# Patient Record
Sex: MALE | Race: WHITE | NOT HISPANIC OR LATINO | Employment: UNEMPLOYED | URBAN - NONMETROPOLITAN AREA
[De-identification: names, ages, dates, MRNs, and addresses within clinical notes are randomized per-mention and may not be internally consistent; named-entity substitution may affect disease eponyms.]

---

## 2018-01-03 ENCOUNTER — HOSPITAL ENCOUNTER (EMERGENCY)
Facility: HOSPITAL | Age: 48
Discharge: HOME | End: 2018-01-03
Attending: EMERGENCY MEDICINE | Admitting: EMERGENCY MEDICINE

## 2018-01-03 VITALS
TEMPERATURE: 98.5 F | DIASTOLIC BLOOD PRESSURE: 78 MMHG | WEIGHT: 265 LBS | BODY MASS INDEX: 41.59 KG/M2 | OXYGEN SATURATION: 98 % | HEIGHT: 67 IN | HEART RATE: 91 BPM | SYSTOLIC BLOOD PRESSURE: 144 MMHG | RESPIRATION RATE: 18 BRPM

## 2018-01-03 DIAGNOSIS — H60.503 ACUTE OTITIS EXTERNA OF BOTH EARS, UNSPECIFIED TYPE: ICD-10-CM

## 2018-01-03 DIAGNOSIS — L01.00: ICD-10-CM

## 2018-01-03 DIAGNOSIS — L01.00 BLEPHARITIS OF LEFT EYE WITH IMPETIGO: Primary | ICD-10-CM

## 2018-01-03 DIAGNOSIS — H01.003: ICD-10-CM

## 2018-01-03 DIAGNOSIS — H01.006 BLEPHARITIS OF LEFT EYE WITH IMPETIGO: Primary | ICD-10-CM

## 2018-01-03 PROCEDURE — 99283 EMERGENCY DEPT VISIT LOW MDM: CPT

## 2018-01-03 RX ORDER — CIPROFLOXACIN AND DEXAMETHASONE 3; 1 MG/ML; MG/ML
4 SUSPENSION/ DROPS AURICULAR (OTIC) 2 TIMES DAILY
Status: DISCONTINUED | OUTPATIENT
Start: 2018-01-03 | End: 2018-01-04 | Stop reason: HOSPADM

## 2018-01-03 RX ORDER — SULFAMETHOXAZOLE AND TRIMETHOPRIM 800; 160 MG/1; MG/1
1 TABLET ORAL 2 TIMES DAILY
Qty: 20 TABLET | Refills: 0 | Status: ON HOLD | OUTPATIENT
Start: 2018-01-03 | End: 2018-07-25

## 2018-01-03 RX ORDER — SULFAMETHOXAZOLE AND TRIMETHOPRIM 800; 160 MG/1; MG/1
1 TABLET ORAL ONCE
Status: COMPLETED | OUTPATIENT
Start: 2018-01-03 | End: 2018-01-03

## 2018-01-03 RX ADMIN — CIPROFLOXACIN AND DEXAMETHASONE 4 DROP: 3; 1 SUSPENSION/ DROPS AURICULAR (OTIC) at 22:34

## 2018-01-03 RX ADMIN — SULFAMETHOXAZOLE AND TRIMETHOPRIM 160 MG: 800; 160 TABLET ORAL at 22:34

## 2018-01-04 NOTE — ED PROVIDER NOTES
Subjective   Patient is a 47 y.o. male presenting with eye problem, ear pain, and abscess.   History provided by:  Patient  Eye Problem   Location:  Both eyes  Quality:  Burning  Severity:  Moderate  Onset quality:  Gradual  Timing:  Constant  Progression:  Worsening  Chronicity:  New  Relieved by:  Nothing  Associated symptoms: crusting, discharge, redness and tearing    Earache   Location:  Bilateral  Behind ear:  Redness  Quality:  Aching  Severity:  Mild  Onset quality:  Gradual  Timing:  Constant  Chronicity:  New  Relieved by:  Nothing  Ineffective treatments:  OTC medications  Associated symptoms: no abdominal pain and no fever    Abscess   Location:  Pelvis  Pelvic abscess location:  Groin  Size:  2cm  Abscess quality: draining and painful    Red streaking: no    Progression:  Improving  Pain details:     Quality:  Pressure and throbbing    Severity:  Mild    Progression:  Improving  Chronicity:  New  Relieved by:  Topical antibiotics  Associated symptoms: no fever        Review of Systems   Constitutional: Negative.  Negative for fever.   HENT: Positive for ear pain.    Eyes: Positive for discharge and redness.   Respiratory: Negative.    Cardiovascular: Negative.  Negative for chest pain.   Gastrointestinal: Negative.  Negative for abdominal pain.   Endocrine: Negative.    Genitourinary: Negative.  Negative for dysuria.   Skin: Negative.    Neurological: Negative.    Psychiatric/Behavioral: Negative.    All other systems reviewed and are negative.      No past medical history on file.    Allergies   Allergen Reactions   • Penicillins        No past surgical history on file.    No family history on file.    Social History     Social History   • Marital status:      Spouse name: N/A   • Number of children: N/A   • Years of education: N/A     Social History Main Topics   • Smoking status: Not on file   • Smokeless tobacco: Not on file   • Alcohol use Not on file   • Drug use: Not on file   • Sexual  activity: Not on file     Other Topics Concern   • Not on file     Social History Narrative           Objective   Physical Exam   Constitutional: He is oriented to person, place, and time. He appears well-developed and well-nourished. No distress.   HENT:   Head: Normocephalic and atraumatic.   Nose: Nose normal.   Exudate noted in canal of both ears.   Eyes: Conjunctivae and EOM are normal. Pupils are equal, round, and reactive to light. Right eye exhibits discharge. Left eye exhibits discharge.   Crusted lesions noted to the lateral canthus of both eyes.   Neck: Normal range of motion. Neck supple. No JVD present. No tracheal deviation present.   Cardiovascular: Normal rate, regular rhythm and normal heart sounds.    No murmur heard.  Pulmonary/Chest: Effort normal and breath sounds normal. No respiratory distress. He has no wheezes.   Abdominal: Soft. Bowel sounds are normal. There is no tenderness.   Musculoskeletal: Normal range of motion. He exhibits no edema or deformity.   Neurological: He is alert and oriented to person, place, and time. No cranial nerve deficit.   Skin: Skin is warm and dry. No rash noted. He is not diaphoretic. No erythema. No pallor.   2 cm draining abscess noted to the right inguinal area.   Psychiatric: He has a normal mood and affect. His behavior is normal. Thought content normal.   Nursing note and vitals reviewed.      Procedures         ED Course  ED Course                  MDM  Number of Diagnoses or Management Options  Acute otitis externa of both ears, unspecified type: new and requires workup  Blepharitis of left eye with impetigo: new and requires workup  Blepharitis with impetigo, right: new and requires workup  Risk of Complications, Morbidity, and/or Mortality  Presenting problems: low  Diagnostic procedures: low  Management options: low    Patient Progress  Patient progress: stable      Final diagnoses:   Blepharitis of left eye with impetigo   Blepharitis with impetigo,  right   Acute otitis externa of both ears, unspecified type            WENDIE Cruz  01/03/18 [id number]

## 2018-07-25 ENCOUNTER — HOSPITAL ENCOUNTER (OUTPATIENT)
Facility: HOSPITAL | Age: 48
Setting detail: OBSERVATION
Discharge: HOME | End: 2018-07-27
Attending: INTERNAL MEDICINE | Admitting: INTERNAL MEDICINE

## 2018-07-25 ENCOUNTER — OFFICE VISIT (OUTPATIENT)
Dept: CARDIOLOGY | Facility: CLINIC | Age: 48
End: 2018-07-25

## 2018-07-25 VITALS
DIASTOLIC BLOOD PRESSURE: 74 MMHG | SYSTOLIC BLOOD PRESSURE: 117 MMHG | RESPIRATION RATE: 16 BRPM | HEIGHT: 66 IN | HEART RATE: 95 BPM | WEIGHT: 270 LBS | BODY MASS INDEX: 43.39 KG/M2

## 2018-07-25 DIAGNOSIS — R07.2 PRECORDIAL PAIN: Primary | ICD-10-CM

## 2018-07-25 DIAGNOSIS — E11.9 TYPE 2 DIABETES MELLITUS WITHOUT COMPLICATION, WITHOUT LONG-TERM CURRENT USE OF INSULIN (HCC): ICD-10-CM

## 2018-07-25 DIAGNOSIS — I25.10 ASCVD (ARTERIOSCLEROTIC CARDIOVASCULAR DISEASE): ICD-10-CM

## 2018-07-25 DIAGNOSIS — I10 ESSENTIAL HYPERTENSION: ICD-10-CM

## 2018-07-25 DIAGNOSIS — R06.09 DYSPNEA ON EXERTION: ICD-10-CM

## 2018-07-25 PROBLEM — R07.9 CHEST PAIN: Status: ACTIVE | Noted: 2018-07-25

## 2018-07-25 LAB — GLUCOSE BLDC GLUCOMTR-MCNC: 229 MG/DL (ref 70–130)

## 2018-07-25 PROCEDURE — 85379 FIBRIN DEGRADATION QUANT: CPT | Performed by: INTERNAL MEDICINE

## 2018-07-25 PROCEDURE — 83880 ASSAY OF NATRIURETIC PEPTIDE: CPT | Performed by: INTERNAL MEDICINE

## 2018-07-25 PROCEDURE — 82962 GLUCOSE BLOOD TEST: CPT

## 2018-07-25 PROCEDURE — 99220 PR INITIAL OBSERVATION CARE/DAY 70 MINUTES: CPT | Performed by: INTERNAL MEDICINE

## 2018-07-25 PROCEDURE — 83036 HEMOGLOBIN GLYCOSYLATED A1C: CPT | Performed by: INTERNAL MEDICINE

## 2018-07-25 PROCEDURE — G0379 DIRECT REFER HOSPITAL OBSERV: HCPCS

## 2018-07-25 PROCEDURE — 80053 COMPREHEN METABOLIC PANEL: CPT | Performed by: INTERNAL MEDICINE

## 2018-07-25 PROCEDURE — 86141 C-REACTIVE PROTEIN HS: CPT | Performed by: INTERNAL MEDICINE

## 2018-07-25 PROCEDURE — 82550 ASSAY OF CK (CPK): CPT | Performed by: INTERNAL MEDICINE

## 2018-07-25 PROCEDURE — 85025 COMPLETE CBC W/AUTO DIFF WBC: CPT | Performed by: INTERNAL MEDICINE

## 2018-07-25 PROCEDURE — 83874 ASSAY OF MYOGLOBIN: CPT | Performed by: INTERNAL MEDICINE

## 2018-07-25 PROCEDURE — 84484 ASSAY OF TROPONIN QUANT: CPT | Performed by: INTERNAL MEDICINE

## 2018-07-25 PROCEDURE — G0378 HOSPITAL OBSERVATION PER HR: HCPCS

## 2018-07-25 PROCEDURE — 82553 CREATINE MB FRACTION: CPT | Performed by: INTERNAL MEDICINE

## 2018-07-25 RX ORDER — NAPROXEN 250 MG/1
500 TABLET ORAL DAILY
Status: DISCONTINUED | OUTPATIENT
Start: 2018-07-26 | End: 2018-07-25

## 2018-07-25 RX ORDER — METOPROLOL SUCCINATE 50 MG/1
50 TABLET, EXTENDED RELEASE ORAL DAILY
Status: DISCONTINUED | OUTPATIENT
Start: 2018-07-26 | End: 2018-07-27 | Stop reason: HOSPADM

## 2018-07-25 RX ORDER — METOPROLOL SUCCINATE 50 MG/1
50 TABLET, EXTENDED RELEASE ORAL DAILY
COMMUNITY
End: 2020-09-09

## 2018-07-25 RX ORDER — ALBUTEROL SULFATE 2.5 MG/3ML
2.5 SOLUTION RESPIRATORY (INHALATION) EVERY 6 HOURS PRN
Status: DISCONTINUED | OUTPATIENT
Start: 2018-07-25 | End: 2018-07-27 | Stop reason: HOSPADM

## 2018-07-25 RX ORDER — ATORVASTATIN CALCIUM 20 MG/1
20 TABLET, FILM COATED ORAL DAILY
Status: DISCONTINUED | OUTPATIENT
Start: 2018-07-26 | End: 2018-07-27 | Stop reason: HOSPADM

## 2018-07-25 RX ORDER — CLOPIDOGREL BISULFATE 75 MG/1
75 TABLET ORAL DAILY
COMMUNITY
End: 2022-01-04

## 2018-07-25 RX ORDER — ATORVASTATIN CALCIUM 20 MG/1
20 TABLET, FILM COATED ORAL DAILY
COMMUNITY

## 2018-07-25 RX ORDER — ASPIRIN 81 MG/1
243 TABLET ORAL DAILY
COMMUNITY
End: 2021-02-17

## 2018-07-25 RX ORDER — CLOPIDOGREL BISULFATE 75 MG/1
75 TABLET ORAL DAILY
Status: DISCONTINUED | OUTPATIENT
Start: 2018-07-26 | End: 2018-07-27 | Stop reason: HOSPADM

## 2018-07-25 RX ORDER — NAPROXEN 500 MG/1
500 TABLET ORAL DAILY
COMMUNITY
End: 2021-09-29 | Stop reason: ALTCHOICE

## 2018-07-25 RX ORDER — SODIUM CHLORIDE 0.9 % (FLUSH) 0.9 %
1-10 SYRINGE (ML) INJECTION AS NEEDED
Status: DISCONTINUED | OUTPATIENT
Start: 2018-07-25 | End: 2018-07-27 | Stop reason: HOSPADM

## 2018-07-25 RX ORDER — METFORMIN HYDROCHLORIDE 500 MG/1
1000 TABLET, EXTENDED RELEASE ORAL 2 TIMES DAILY
COMMUNITY

## 2018-07-25 RX ORDER — ALBUTEROL SULFATE 90 UG/1
2 AEROSOL, METERED RESPIRATORY (INHALATION) EVERY 6 HOURS PRN
COMMUNITY
End: 2020-09-09

## 2018-07-25 RX ORDER — ASPIRIN 81 MG/1
243 TABLET ORAL DAILY
Status: CANCELLED | OUTPATIENT
Start: 2018-07-26

## 2018-07-25 RX ORDER — ASPIRIN 81 MG/1
81 TABLET ORAL DAILY
Status: DISCONTINUED | OUTPATIENT
Start: 2018-07-26 | End: 2018-07-27 | Stop reason: HOSPADM

## 2018-07-25 RX ADMIN — METFORMIN HYDROCHLORIDE 250 MG: 500 TABLET ORAL at 21:49

## 2018-07-25 NOTE — PROGRESS NOTES
Norah Dawkins PA-C  Austin Sneed  1970 07/25/2018    Patient Active Problem List   Diagnosis   • Precordial pain   • Dyspnea on exertion   • ASCVD (arteriosclerotic cardiovascular disease), status post stenting about 3 years ago in [facility].        Dear Norah Dawkins PA-C:    Subjective     Austin Sneed is a 47 y.o. male with the problems as listed above, presents    Chief complaint: Chest pains and shortness of breath.    History of Present Illness: Mr. Sneed is a pleasant 47-year-old  male with history of known ASCVD, status post stenting about 3 years ago in [facility], presents with complaints of having increasing shortness of breath and some chest pains for over 2 weeks.  He describes the chest pains as being felt as dull pain on the left of his chest associated shortness of breath but no sweating.  These pains have been occurring with no relation to exertion.  He also has been having increasing dyspnea and is currently short of breath with just mild exertion.  He denies any leg edema.  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  He has been a nonsmoker for the last 3 years.      Allergies   Allergen Reactions   • Penicillins    :      Current Outpatient Prescriptions:   •  Albuterol (VENTOLIN IN), Inhale., Disp: , Rfl:   •  aspirin 81 MG EC tablet, Take 243 mg by mouth Daily. Takes 3 tabs per dose, Disp: , Rfl:   •  atorvastatin (LIPITOR) 20 MG tablet, Take 20 mg by mouth Daily., Disp: , Rfl:   •  clopidogrel (PLAVIX) 75 MG tablet, Take 75 mg by mouth Daily., Disp: , Rfl:   •  metFORMIN (GLUCOPHAGE) 500 MG tablet, Take 500 mg by mouth 2 (Two) Times a Day With Meals., Disp: , Rfl:   •  metoprolol succinate XL (TOPROL-XL) 50 MG 24 hr tablet, Take 50 mg by mouth Daily., Disp: , Rfl:   •  naproxen (NAPROSYN) 500 MG tablet, Take 500 mg by mouth Daily., Disp: , Rfl:   •  sulfamethoxazole-trimethoprim (BACTRIM  "DS,SEPTRA DS) 800-160 MG per tablet, Take 1 tablet by mouth 2 (Two) Times a Day., Disp: 20 tablet, Rfl: 0    Past Medical History:   Diagnosis Date   • Diabetes mellitus (CMS/HCC)    • Hyperlipidemia    • Hypertension    • Sleep apnea      Past Surgical History:   Procedure Laterality Date   • CORONARY STENT PLACEMENT  2015    x1 stent [place] [facility]     Family History   Problem Relation Age of Onset   • Heart disease Father    • Heart attack Maternal Grandmother      Social History   Substance Use Topics   • Smoking status: Former Smoker     Packs/day: 0.50     Types: Cigarettes     Quit date: 2015   • Smokeless tobacco: Never Used   • Alcohol use Yes      Comment: social       Review of Systems   Constitution: Positive for malaise/fatigue.   Cardiovascular: Positive for chest pain and leg swelling.   Respiratory: Positive for shortness of breath.    Endocrine: Positive for polydipsia and polyuria.   Musculoskeletal: Positive for back pain, joint pain, joint swelling and stiffness.   Genitourinary: Positive for frequency.   Neurological: Positive for dizziness, headaches and light-headedness.       Objective   Blood pressure 117/74, pulse 95, resp. rate 16, height 167.6 cm (66\"), weight 122 kg (270 lb).  Body mass index is 43.58 kg/m².        Physical Exam   Constitutional: He is oriented to person, place, and time. He appears well-developed and well-nourished.   HENT:   Mouth/Throat: Oropharynx is clear and moist.   Eyes: Pupils are equal, round, and reactive to light. EOM are normal.   Neck: Neck supple. No JVD present. No tracheal deviation present. No thyromegaly present.   Cardiovascular: Normal rate, regular rhythm, S1 normal and S2 normal.  Exam reveals no gallop, no S3 and no friction rub.    No murmur heard.  Pulses:       Dorsalis pedis pulses are 1+ on the right side, and 1+ on the left side.        Posterior tibial pulses are 2+ on the right side, and 2+ on the left side.   Pulmonary/Chest: Effort normal " and breath sounds normal.   Abdominal: Soft. Bowel sounds are normal. He exhibits no mass. There is no tenderness.   Musculoskeletal: Normal range of motion. He exhibits no edema.   Lymphadenopathy:     He has no cervical adenopathy.   Neurological: He is alert and oriented to person, place, and time.   Skin: Skin is warm and dry. No rash noted.   Psychiatric: He has a normal mood and affect.           ECG 12 Lead  Date/Time: 7/25/2018 3:17 PM  Performed by: ELIAS TEE  Authorized by: ELIAS TEE   Rhythm: sinus tachycardia  Conduction: conduction normal  ST Segments: ST segments normal  T Waves: T waves normal                Assessment/Plan    Diagnosis Plan   1. Precordial pain     2. Dyspnea on exertion     3. ASCVD (arteriosclerotic cardiovascular disease), status post stenting about 3 years ago in [facility].      4. Essential hypertension     5. Type 2 diabetes mellitus without complication, without long-term current use of insulin (CMS/[facility])         Recommendations:    Since he has developed progressive dyspnea and chest pains, we will go and admit him to the hospital for further evaluation and management.  I've discussed this with him and is agreeable.    No Follow-up on file.    As always, I appreciate very much the opportunity to participate in the cardiovascular care of your patients.      With Best Regards,    Elias Tee MD, [facility]    Dragon disclaimer:  Much of this encounter note is an electronic transcription/translation of spoken language to printed text. The electronic translation of spoken language may permit erroneous, or at times, nonsensical words or phrases to be inadvertently transcribed; Although I have reviewed the note for such errors, some may still exist.

## 2018-07-26 ENCOUNTER — APPOINTMENT (OUTPATIENT)
Dept: NUCLEAR MEDICINE | Facility: HOSPITAL | Age: 48
End: 2018-07-26

## 2018-07-26 ENCOUNTER — APPOINTMENT (OUTPATIENT)
Dept: CARDIOLOGY | Facility: HOSPITAL | Age: 48
End: 2018-07-26
Attending: INTERNAL MEDICINE

## 2018-07-26 ENCOUNTER — APPOINTMENT (OUTPATIENT)
Dept: CARDIOLOGY | Facility: HOSPITAL | Age: 48
End: 2018-07-26

## 2018-07-26 ENCOUNTER — APPOINTMENT (OUTPATIENT)
Dept: GENERAL RADIOLOGY | Facility: HOSPITAL | Age: 48
End: 2018-07-26

## 2018-07-26 LAB
ALBUMIN SERPL-MCNC: 4.2 G/DL (ref 3.5–5)
ALBUMIN/GLOB SERPL: 1.7 G/DL (ref 1.5–2.5)
ALP SERPL-CCNC: 56 U/L (ref 40–129)
ALT SERPL W P-5'-P-CCNC: 65 U/L (ref 10–44)
ANION GAP SERPL CALCULATED.3IONS-SCNC: 7.5 MMOL/L (ref 3.6–11.2)
AST SERPL-CCNC: 29 U/L (ref 10–34)
BASOPHILS # BLD AUTO: 0.04 10*3/MM3 (ref 0–0.3)
BASOPHILS NFR BLD AUTO: 0.5 % (ref 0–2)
BH CV ECHO MEAS - % IVS THICK: 0 %
BH CV ECHO MEAS - % LVPW THICK: 16.7 %
BH CV ECHO MEAS - ACS: 1.9 CM
BH CV ECHO MEAS - AO MAX PG (FULL): 0.98 MMHG
BH CV ECHO MEAS - AO MAX PG: 5.1 MMHG
BH CV ECHO MEAS - AO MEAN PG (FULL): 0.71 MMHG
BH CV ECHO MEAS - AO MEAN PG: 2.7 MMHG
BH CV ECHO MEAS - AO ROOT AREA: 10.9 CM^2
BH CV ECHO MEAS - AO ROOT DIAM: 3.7 CM
BH CV ECHO MEAS - AO V2 MAX: 112.7 CM/SEC
BH CV ECHO MEAS - AO V2 MEAN: 74.8 CM/SEC
BH CV ECHO MEAS - AO V2 VTI: 20.5 CM
BH CV ECHO MEAS - AVA(I,A): 3.4 CM^2
BH CV ECHO MEAS - AVA(I,D): 3.4 CM^2
BH CV ECHO MEAS - AVA(V,A): 3.1 CM^2
BH CV ECHO MEAS - AVA(V,D): 3.1 CM^2
BH CV ECHO MEAS - EDV(CUBED): 77.2 ML
BH CV ECHO MEAS - EDV(TEICH): 81.1 ML
BH CV ECHO MEAS - EF(CUBED): 58.3 %
BH CV ECHO MEAS - EF(TEICH): 50.2 %
BH CV ECHO MEAS - ESV(CUBED): 32.2 ML
BH CV ECHO MEAS - ESV(TEICH): 40.4 ML
BH CV ECHO MEAS - FS: 25.3 %
BH CV ECHO MEAS - IVS/LVPW: 0.97
BH CV ECHO MEAS - IVSD: 1.3 CM
BH CV ECHO MEAS - IVSS: 1.3 CM
BH CV ECHO MEAS - LA DIMENSION: 2.8 CM
BH CV ECHO MEAS - LA/AO: 0.75
BH CV ECHO MEAS - LV MASS(C)D: 209.9 GRAMS
BH CV ECHO MEAS - LV MASS(C)S: 158 GRAMS
BH CV ECHO MEAS - LV MAX PG: 4.1 MMHG
BH CV ECHO MEAS - LV MEAN PG: 1.9 MMHG
BH CV ECHO MEAS - LV V1 MAX: 101.2 CM/SEC
BH CV ECHO MEAS - LV V1 MEAN: 62.2 CM/SEC
BH CV ECHO MEAS - LV V1 VTI: 20.6 CM
BH CV ECHO MEAS - LVIDD: 4.3 CM
BH CV ECHO MEAS - LVIDS: 3.2 CM
BH CV ECHO MEAS - LVOT AREA (M): 3.5 CM^2
BH CV ECHO MEAS - LVOT AREA: 3.4 CM^2
BH CV ECHO MEAS - LVOT DIAM: 2.1 CM
BH CV ECHO MEAS - LVPWD: 1.3 CM
BH CV ECHO MEAS - LVPWS: 1.6 CM
BH CV ECHO MEAS - MV A MAX VEL: 63.7 CM/SEC
BH CV ECHO MEAS - MV DEC SLOPE: 226.2 CM/SEC^2
BH CV ECHO MEAS - MV E MAX VEL: 60.2 CM/SEC
BH CV ECHO MEAS - MV E/A: 0.95
BH CV ECHO MEAS - MV P1/2T MAX VEL: 61.2 CM/SEC
BH CV ECHO MEAS - MV P1/2T: 79.3 MSEC
BH CV ECHO MEAS - MVA P1/2T LCG: 3.6 CM^2
BH CV ECHO MEAS - MVA(P1/2T): 2.8 CM^2
BH CV ECHO MEAS - RVDD: 2.8 CM
BH CV ECHO MEAS - SV(AO): 224.9 ML
BH CV ECHO MEAS - SV(CUBED): 44.9 ML
BH CV ECHO MEAS - SV(LVOT): 70.6 ML
BH CV ECHO MEAS - SV(TEICH): 40.7 ML
BH CV NUCLEAR PRIOR STUDY: 3
BH CV STRESS BP STAGE 1: NORMAL
BH CV STRESS BP STAGE 2: NORMAL
BH CV STRESS COMMENTS STAGE 1: NORMAL
BH CV STRESS COMMENTS STAGE 2: NORMAL
BH CV STRESS DOSE REGADENOSON STAGE 1: 0.4
BH CV STRESS DURATION MIN STAGE 1: 0
BH CV STRESS DURATION MIN STAGE 2: 4
BH CV STRESS DURATION SEC STAGE 1: 10
BH CV STRESS DURATION SEC STAGE 2: 0
BH CV STRESS HR STAGE 1: 117
BH CV STRESS HR STAGE 2: 94
BH CV STRESS PROTOCOL 1: NORMAL
BH CV STRESS RECOVERY BP: NORMAL MMHG
BH CV STRESS RECOVERY HR: 94 BPM
BH CV STRESS STAGE 1: 1
BH CV STRESS STAGE 2: 2
BILIRUB SERPL-MCNC: 0.4 MG/DL (ref 0.2–1.8)
BNP SERPL-MCNC: <2 PG/ML (ref 0–100)
BUN BLD-MCNC: 19 MG/DL (ref 7–21)
BUN/CREAT SERPL: 20 (ref 7–25)
CALCIUM SPEC-SCNC: 9.1 MG/DL (ref 7.7–10)
CHLORIDE SERPL-SCNC: 106 MMOL/L (ref 99–112)
CHOLEST SERPL-MCNC: 116 MG/DL (ref 0–200)
CK MB SERPL-CCNC: 0.58 NG/ML (ref 0–5)
CK MB SERPL-RTO: 0.4 % (ref 0–3)
CK SERPL-CCNC: 132 U/L (ref 24–204)
CO2 SERPL-SCNC: 25.5 MMOL/L (ref 24.3–31.9)
CREAT BLD-MCNC: 0.95 MG/DL (ref 0.43–1.29)
D DIMER PPP FEU-MCNC: <0.27 MCGFEU/ML (ref 0–0.5)
DEPRECATED RDW RBC AUTO: 42.2 FL (ref 37–54)
EOSINOPHIL # BLD AUTO: 0.15 10*3/MM3 (ref 0–0.7)
EOSINOPHIL NFR BLD AUTO: 1.7 % (ref 0–5)
ERYTHROCYTE [DISTWIDTH] IN BLOOD BY AUTOMATED COUNT: 13.4 % (ref 11.5–14.5)
GFR SERPL CREATININE-BSD FRML MDRD: 84 ML/MIN/1.73
GLOBULIN UR ELPH-MCNC: 2.5 GM/DL
GLUCOSE BLD-MCNC: 195 MG/DL (ref 70–110)
HBA1C MFR BLD: 9.4 % (ref 4.5–5.7)
HCT VFR BLD AUTO: 44.6 % (ref 42–52)
HDLC SERPL-MCNC: 32 MG/DL (ref 60–100)
HGB BLD-MCNC: 14.9 G/DL (ref 14–18)
IMM GRANULOCYTES # BLD: 0.03 10*3/MM3 (ref 0–0.03)
IMM GRANULOCYTES NFR BLD: 0.3 % (ref 0–0.5)
LDLC SERPL CALC-MCNC: 46 MG/DL (ref 0–100)
LDLC/HDLC SERPL: 1.43 {RATIO}
LV EF NUC BP: 63 %
LYMPHOCYTES # BLD AUTO: 2.3 10*3/MM3 (ref 1–3)
LYMPHOCYTES NFR BLD AUTO: 26.3 % (ref 21–51)
MAXIMAL PREDICTED HEART RATE: 171 BPM
MAXIMAL PREDICTED HEART RATE: 171 BPM
MCH RBC QN AUTO: 29.2 PG (ref 27–33)
MCHC RBC AUTO-ENTMCNC: 33.4 G/DL (ref 33–37)
MCV RBC AUTO: 87.5 FL (ref 80–94)
MONOCYTES # BLD AUTO: 0.96 10*3/MM3 (ref 0.1–0.9)
MONOCYTES NFR BLD AUTO: 11 % (ref 0–10)
MYOGLOBIN SERPL-MCNC: 42 NG/ML (ref 0–109)
NEUTROPHILS # BLD AUTO: 5.26 10*3/MM3 (ref 1.4–6.5)
NEUTROPHILS NFR BLD AUTO: 60.2 % (ref 30–70)
OSMOLALITY SERPL CALC.SUM OF ELEC: 285.2 MOSM/KG (ref 273–305)
PERCENT MAX PREDICTED HR: 68.42 %
PLATELET # BLD AUTO: 224 10*3/MM3 (ref 130–400)
PMV BLD AUTO: 10.5 FL (ref 6–10)
POTASSIUM BLD-SCNC: 3.8 MMOL/L (ref 3.5–5.3)
PROT SERPL-MCNC: 6.7 G/DL (ref 6–8)
RBC # BLD AUTO: 5.1 10*6/MM3 (ref 4.7–6.1)
SODIUM BLD-SCNC: 139 MMOL/L (ref 135–153)
STRESS BASELINE BP: NORMAL MMHG
STRESS BASELINE HR: 89 BPM
STRESS PERCENT HR: 80 %
STRESS POST PEAK BP: NORMAL MMHG
STRESS POST PEAK HR: 117 BPM
STRESS TARGET HR: 145 BPM
STRESS TARGET HR: 145 BPM
TRIGL SERPL-MCNC: 192 MG/DL (ref 0–150)
TROPONIN I SERPL-MCNC: <0.006 NG/ML
VLDLC SERPL-MCNC: 38.4 MG/DL
WBC NRBC COR # BLD: 8.74 10*3/MM3 (ref 4.5–12.5)

## 2018-07-26 PROCEDURE — 0 TECHNETIUM SESTAMIBI: Performed by: INTERNAL MEDICINE

## 2018-07-26 PROCEDURE — G0378 HOSPITAL OBSERVATION PER HR: HCPCS

## 2018-07-26 PROCEDURE — 93010 ELECTROCARDIOGRAM REPORT: CPT | Performed by: INTERNAL MEDICINE

## 2018-07-26 PROCEDURE — 93018 CV STRESS TEST I&R ONLY: CPT | Performed by: INTERNAL MEDICINE

## 2018-07-26 PROCEDURE — 71045 X-RAY EXAM CHEST 1 VIEW: CPT | Performed by: RADIOLOGY

## 2018-07-26 PROCEDURE — A9500 TC99M SESTAMIBI: HCPCS | Performed by: INTERNAL MEDICINE

## 2018-07-26 PROCEDURE — 93306 TTE W/DOPPLER COMPLETE: CPT

## 2018-07-26 PROCEDURE — 93017 CV STRESS TEST TRACING ONLY: CPT

## 2018-07-26 PROCEDURE — 96372 THER/PROPH/DIAG INJ SC/IM: CPT

## 2018-07-26 PROCEDURE — 99225 PR SBSQ OBSERVATION CARE/DAY 25 MINUTES: CPT | Performed by: INTERNAL MEDICINE

## 2018-07-26 PROCEDURE — 93306 TTE W/DOPPLER COMPLETE: CPT | Performed by: INTERNAL MEDICINE

## 2018-07-26 PROCEDURE — 80061 LIPID PANEL: CPT | Performed by: INTERNAL MEDICINE

## 2018-07-26 PROCEDURE — 94799 UNLISTED PULMONARY SVC/PX: CPT

## 2018-07-26 PROCEDURE — 93005 ELECTROCARDIOGRAM TRACING: CPT | Performed by: INTERNAL MEDICINE

## 2018-07-26 PROCEDURE — 84484 ASSAY OF TROPONIN QUANT: CPT | Performed by: INTERNAL MEDICINE

## 2018-07-26 PROCEDURE — 78452 HT MUSCLE IMAGE SPECT MULT: CPT

## 2018-07-26 PROCEDURE — 94640 AIRWAY INHALATION TREATMENT: CPT

## 2018-07-26 PROCEDURE — 25010000002 REGADENOSON 0.4 MG/5ML SOLUTION: Performed by: INTERNAL MEDICINE

## 2018-07-26 PROCEDURE — 71045 X-RAY EXAM CHEST 1 VIEW: CPT

## 2018-07-26 PROCEDURE — 25010000002 ENOXAPARIN PER 10 MG: Performed by: INTERNAL MEDICINE

## 2018-07-26 PROCEDURE — 78452 HT MUSCLE IMAGE SPECT MULT: CPT | Performed by: INTERNAL MEDICINE

## 2018-07-26 RX ADMIN — REGADENOSON 0.4 MG: 0.08 INJECTION, SOLUTION INTRAVENOUS at 10:25

## 2018-07-26 RX ADMIN — TECHNETIUM TC 99M SESTAMIBI 1 DOSE: 1 INJECTION INTRAVENOUS at 09:15

## 2018-07-26 RX ADMIN — ATORVASTATIN CALCIUM 20 MG: 20 TABLET, FILM COATED ORAL at 07:41

## 2018-07-26 RX ADMIN — ALBUTEROL SULFATE 2.5 MG: 2.5 SOLUTION RESPIRATORY (INHALATION) at 00:29

## 2018-07-26 RX ADMIN — METFORMIN HYDROCHLORIDE 500 MG: 500 TABLET ORAL at 11:52

## 2018-07-26 RX ADMIN — ENOXAPARIN SODIUM 40 MG: 40 INJECTION, SOLUTION INTRAVENOUS; SUBCUTANEOUS at 19:51

## 2018-07-26 RX ADMIN — TECHNETIUM TC 99M SESTAMIBI 1 DOSE: 1 INJECTION INTRAVENOUS at 10:25

## 2018-07-26 RX ADMIN — ENOXAPARIN SODIUM 40 MG: 40 INJECTION, SOLUTION INTRAVENOUS; SUBCUTANEOUS at 00:27

## 2018-07-26 RX ADMIN — ASPIRIN 81 MG: 81 TABLET, DELAYED RELEASE ORAL at 07:42

## 2018-07-26 RX ADMIN — METOPROLOL SUCCINATE 50 MG: 50 TABLET, FILM COATED, EXTENDED RELEASE ORAL at 11:51

## 2018-07-26 RX ADMIN — METFORMIN HYDROCHLORIDE 500 MG: 500 TABLET ORAL at 19:51

## 2018-07-26 RX ADMIN — CLOPIDOGREL 75 MG: 75 TABLET, FILM COATED ORAL at 07:41

## 2018-07-27 VITALS
DIASTOLIC BLOOD PRESSURE: 90 MMHG | TEMPERATURE: 98.4 F | RESPIRATION RATE: 20 BRPM | WEIGHT: 270.2 LBS | OXYGEN SATURATION: 97 % | BODY MASS INDEX: 42.41 KG/M2 | SYSTOLIC BLOOD PRESSURE: 140 MMHG | HEART RATE: 84 BPM | HEIGHT: 67 IN

## 2018-07-27 LAB — CRP SERPL HS-MCNC: 1.75 MG/L (ref 0–3)

## 2018-07-27 PROCEDURE — G0378 HOSPITAL OBSERVATION PER HR: HCPCS

## 2018-07-27 PROCEDURE — 99217 PR OBSERVATION CARE DISCHARGE MANAGEMENT: CPT | Performed by: INTERNAL MEDICINE

## 2018-07-27 RX ORDER — ISOSORBIDE MONONITRATE 60 MG/1
60 TABLET, EXTENDED RELEASE ORAL EVERY MORNING
Qty: 30 TABLET | Refills: 3 | Status: SHIPPED | OUTPATIENT
Start: 2018-07-27 | End: 2018-11-24

## 2018-07-27 RX ADMIN — ATORVASTATIN CALCIUM 20 MG: 20 TABLET, FILM COATED ORAL at 07:41

## 2018-07-27 RX ADMIN — ASPIRIN 81 MG: 81 TABLET, DELAYED RELEASE ORAL at 07:41

## 2018-07-27 RX ADMIN — METOPROLOL SUCCINATE 50 MG: 50 TABLET, FILM COATED, EXTENDED RELEASE ORAL at 07:41

## 2018-07-27 RX ADMIN — CLOPIDOGREL 75 MG: 75 TABLET, FILM COATED ORAL at 07:41

## 2018-07-27 RX ADMIN — METFORMIN HYDROCHLORIDE 500 MG: 500 TABLET ORAL at 07:41

## 2018-10-26 ENCOUNTER — TELEPHONE (OUTPATIENT)
Dept: CARDIOLOGY | Facility: CLINIC | Age: 48
End: 2018-10-26

## 2018-10-26 DIAGNOSIS — N52.9 ERECTILE DYSFUNCTION, UNSPECIFIED ERECTILE DYSFUNCTION TYPE: Primary | ICD-10-CM

## 2018-10-26 NOTE — TELEPHONE ENCOUNTER
He is but if he wants it we will have to stop his imdur as they absolutly cannot be taken together.

## 2018-10-26 NOTE — TELEPHONE ENCOUNTER
Patient wants to know if his heart is strong enough to take generic viagra. Can someone call them back asap?     Thanks!

## 2019-07-20 ENCOUNTER — TRANSCRIBE ORDERS (OUTPATIENT)
Dept: ADMINISTRATIVE | Facility: HOSPITAL | Age: 49
End: 2019-07-20

## 2019-07-20 ENCOUNTER — APPOINTMENT (OUTPATIENT)
Dept: LAB | Facility: HOSPITAL | Age: 49
End: 2019-07-20

## 2019-07-20 DIAGNOSIS — Z95.5 STENTED CORONARY ARTERY: ICD-10-CM

## 2019-07-20 DIAGNOSIS — E78.5 HYPERLIPIDEMIA, UNSPECIFIED HYPERLIPIDEMIA TYPE: ICD-10-CM

## 2019-07-20 DIAGNOSIS — Z80.0 FAMILY HISTORY OF COLON CANCER: ICD-10-CM

## 2019-07-20 LAB
25(OH)D3 SERPL-MCNC: 34.7 NG/ML (ref 30–100)
ALBUMIN SERPL-MCNC: 4.3 G/DL (ref 3.5–5.2)
ALBUMIN/GLOB SERPL: 1.6 G/DL
ALP SERPL-CCNC: 44 U/L (ref 39–117)
ALT SERPL W P-5'-P-CCNC: 24 U/L (ref 1–41)
ANION GAP SERPL CALCULATED.3IONS-SCNC: 15 MMOL/L (ref 5–15)
AST SERPL-CCNC: 14 U/L (ref 1–40)
BASOPHILS # BLD AUTO: 0.05 10*3/MM3 (ref 0–0.2)
BASOPHILS NFR BLD AUTO: 0.6 % (ref 0–1.5)
BILIRUB SERPL-MCNC: 0.5 MG/DL (ref 0.2–1.2)
BUN BLD-MCNC: 16 MG/DL (ref 6–20)
BUN/CREAT SERPL: 16.5 (ref 7–25)
CALCIUM SPEC-SCNC: 9.4 MG/DL (ref 8.6–10.5)
CHLORIDE SERPL-SCNC: 105 MMOL/L (ref 98–107)
CHOLEST SERPL-MCNC: 97 MG/DL (ref 0–200)
CO2 SERPL-SCNC: 21 MMOL/L (ref 22–29)
CREAT BLD-MCNC: 0.97 MG/DL (ref 0.76–1.27)
DEPRECATED RDW RBC AUTO: 45.5 FL (ref 37–54)
EOSINOPHIL # BLD AUTO: 0.1 10*3/MM3 (ref 0–0.4)
EOSINOPHIL NFR BLD AUTO: 1.2 % (ref 0.3–6.2)
ERYTHROCYTE [DISTWIDTH] IN BLOOD BY AUTOMATED COUNT: 13.4 % (ref 12.3–15.4)
GFR SERPL CREATININE-BSD FRML MDRD: 82 ML/MIN/1.73
GLOBULIN UR ELPH-MCNC: 2.7 GM/DL
GLUCOSE BLD-MCNC: 112 MG/DL (ref 65–99)
HBA1C MFR BLD: 6.15 % (ref 4.8–5.6)
HCT VFR BLD AUTO: 50 % (ref 37.5–51)
HDLC SERPL-MCNC: 35 MG/DL (ref 40–60)
HGB BLD-MCNC: 15 G/DL (ref 13–17.7)
IMM GRANULOCYTES # BLD AUTO: 0.04 10*3/MM3 (ref 0–0.05)
IMM GRANULOCYTES NFR BLD AUTO: 0.5 % (ref 0–0.5)
LDLC SERPL CALC-MCNC: 43 MG/DL (ref 0–100)
LDLC/HDLC SERPL: 1.22 {RATIO}
LYMPHOCYTES # BLD AUTO: 1.67 10*3/MM3 (ref 0.7–3.1)
LYMPHOCYTES NFR BLD AUTO: 19.8 % (ref 19.6–45.3)
MCH RBC QN AUTO: 27.5 PG (ref 26.6–33)
MCHC RBC AUTO-ENTMCNC: 30 G/DL (ref 31.5–35.7)
MCV RBC AUTO: 91.7 FL (ref 79–97)
MONOCYTES # BLD AUTO: 0.7 10*3/MM3 (ref 0.1–0.9)
MONOCYTES NFR BLD AUTO: 8.3 % (ref 5–12)
NEUTROPHILS # BLD AUTO: 5.89 10*3/MM3 (ref 1.7–7)
NEUTROPHILS NFR BLD AUTO: 69.6 % (ref 42.7–76)
NRBC BLD AUTO-RTO: 0.1 /100 WBC (ref 0–0.2)
PLATELET # BLD AUTO: 303 10*3/MM3 (ref 140–450)
PMV BLD AUTO: 9.8 FL (ref 6–12)
POTASSIUM BLD-SCNC: 4.4 MMOL/L (ref 3.5–5.2)
PROT SERPL-MCNC: 7 G/DL (ref 6–8.5)
RBC # BLD AUTO: 5.45 10*6/MM3 (ref 4.14–5.8)
SODIUM BLD-SCNC: 141 MMOL/L (ref 136–145)
TRIGL SERPL-MCNC: 97 MG/DL (ref 0–150)
VLDLC SERPL-MCNC: 19.4 MG/DL (ref 5–40)
WBC NRBC COR # BLD: 8.45 10*3/MM3 (ref 3.4–10.8)

## 2019-07-20 PROCEDURE — 36415 COLL VENOUS BLD VENIPUNCTURE: CPT | Performed by: PHYSICIAN ASSISTANT

## 2019-07-20 PROCEDURE — 85025 COMPLETE CBC W/AUTO DIFF WBC: CPT | Performed by: PHYSICIAN ASSISTANT

## 2019-07-20 PROCEDURE — 80061 LIPID PANEL: CPT | Performed by: PHYSICIAN ASSISTANT

## 2019-07-20 PROCEDURE — 83036 HEMOGLOBIN GLYCOSYLATED A1C: CPT | Performed by: PHYSICIAN ASSISTANT

## 2019-07-20 PROCEDURE — 82306 VITAMIN D 25 HYDROXY: CPT | Performed by: PHYSICIAN ASSISTANT

## 2019-07-20 PROCEDURE — 80053 COMPREHEN METABOLIC PANEL: CPT | Performed by: PHYSICIAN ASSISTANT

## 2019-08-05 ENCOUNTER — TELEPHONE (OUTPATIENT)
Dept: CARDIOLOGY | Facility: CLINIC | Age: 49
End: 2019-08-05

## 2019-08-05 NOTE — TELEPHONE ENCOUNTER
Please call patient who currently has no insurance and wants to know if he can discontinue some of his medication.

## 2019-08-07 NOTE — TELEPHONE ENCOUNTER
Called pt and advised him to contact his PCP because he hasn't been since 7/25/18. He expressed understanding.

## 2019-12-01 ENCOUNTER — APPOINTMENT (OUTPATIENT)
Dept: GENERAL RADIOLOGY | Facility: HOSPITAL | Age: 49
End: 2019-12-01

## 2019-12-01 ENCOUNTER — HOSPITAL ENCOUNTER (EMERGENCY)
Facility: HOSPITAL | Age: 49
Discharge: HOME | End: 2019-12-01
Attending: FAMILY MEDICINE | Admitting: FAMILY MEDICINE

## 2019-12-01 VITALS
DIASTOLIC BLOOD PRESSURE: 68 MMHG | OXYGEN SATURATION: 97 % | SYSTOLIC BLOOD PRESSURE: 114 MMHG | HEART RATE: 76 BPM | HEIGHT: 68 IN | WEIGHT: 240 LBS | BODY MASS INDEX: 36.37 KG/M2 | TEMPERATURE: 98.6 F | RESPIRATION RATE: 20 BRPM

## 2019-12-01 DIAGNOSIS — S82.831A CLOSED FRACTURE OF DISTAL END OF RIGHT FIBULA, UNSPECIFIED FRACTURE MORPHOLOGY, INITIAL ENCOUNTER: Primary | ICD-10-CM

## 2019-12-01 PROCEDURE — 99284 EMERGENCY DEPT VISIT MOD MDM: CPT

## 2019-12-01 PROCEDURE — 73610 X-RAY EXAM OF ANKLE: CPT

## 2019-12-01 PROCEDURE — 73610 X-RAY EXAM OF ANKLE: CPT | Performed by: RADIOLOGY

## 2019-12-01 RX ORDER — HYDROCODONE BITARTRATE AND ACETAMINOPHEN 7.5; 325 MG/1; MG/1
1 TABLET ORAL EVERY 6 HOURS PRN
Qty: 12 TABLET | Refills: 0 | Status: SHIPPED | OUTPATIENT
Start: 2019-12-01 | End: 2019-12-06 | Stop reason: SDUPTHER

## 2019-12-01 RX ORDER — HYDROCODONE BITARTRATE AND ACETAMINOPHEN 7.5; 325 MG/1; MG/1
1 TABLET ORAL ONCE
Status: COMPLETED | OUTPATIENT
Start: 2019-12-01 | End: 2019-12-01

## 2019-12-01 RX ADMIN — HYDROCODONE BITARTRATE AND ACETAMINOPHEN 1 TABLET: 7.5; 325 TABLET ORAL at 12:47

## 2019-12-01 NOTE — ED PROVIDER NOTES
Subjective     History provided by:  Patient   used: No    Fall   Mechanism of injury: fall    Injury location: right ankle.  Incident location:  Home  Time since incident:  1 day  Arrived directly from scene: no    Fall:     Fall occurred: Pt states that he slipped getting out of his car yesterday due to the rain. patient twisted right ankle.      Point of impact: NO HEAD INJURY OR LOC   Tetanus status:  Up to date      Review of Systems   Constitutional: Negative.    HENT: Negative.    Eyes: Negative.    Respiratory: Negative.    Cardiovascular: Negative.    Gastrointestinal: Negative.    Endocrine: Negative.    Genitourinary: Negative.    Musculoskeletal: Negative.    Skin: Negative.    Allergic/Immunologic: Negative.    Neurological: Negative.    Hematological: Negative.    Psychiatric/Behavioral: Negative.    All other systems reviewed and are negative.      Past Medical History:   Diagnosis Date   • Diabetes mellitus (CMS/[facility])    • Hyperlipidemia    • Hypertension    • Sleep apnea        Allergies   Allergen Reactions   • Penicillins        Past Surgical History:   Procedure Laterality Date   • CORONARY STENT PLACEMENT  2015    x1 stent [place] [facility]       Family History   Problem Relation Age of Onset   • Heart disease Father    • Heart attack Maternal Grandmother        Social History     Socioeconomic History   • Marital status:      Spouse name: Not on file   • Number of children: Not on file   • Years of education: Not on file   • Highest education level: Not on file   Tobacco Use   • Smoking status: Former Smoker     Packs/day: 0.50     Types: Cigarettes     Last attempt to quit: 2015     Years since quittin.9   • Smokeless tobacco: Never Used   Substance and Sexual Activity   • Alcohol use: Yes     Comment: social           Objective   Physical Exam   Constitutional: He is oriented to person, place, and time. He appears well-developed and well-nourished.   HENT:   Head:  Normocephalic and atraumatic.   Right Ear: External ear normal.   Left Ear: External ear normal.   Nose: Nose normal.   Mouth/Throat: Oropharynx is clear and moist.   Eyes: Conjunctivae and EOM are normal. Pupils are equal, round, and reactive to light.   Neck: Normal range of motion. Neck supple.   Cardiovascular: Normal rate, regular rhythm, normal heart sounds and intact distal pulses.   Pulmonary/Chest: Effort normal and breath sounds normal.   Abdominal: Soft. Bowel sounds are normal.   Musculoskeletal:        Right ankle: He exhibits decreased range of motion, swelling and ecchymosis. He exhibits no deformity, no laceration and normal pulse. Tenderness.   N/V intact.    Neurological: He is alert and oriented to person, place, and time.   Skin: Skin is warm and dry. Capillary refill takes less than 2 seconds.   Nursing note and vitals reviewed.      Procedures           ED Course  ED Course as of Dec 01 1514   Sun Dec 01, 2019   1224 Impression    Obliquely oriented fracture through the distal fibular shaft           This report was finalized on 12/1/2019 12:08 PM by Dr. Gaston Dempsey MD.     XR Ankle 3+ View Right [ML]   1228 Pt refused IV/IM pain medication.    [ML]   1229 Instructions to f/u with ortho. Discussed sx that would warrant return to the ED.   [ML]   1232 KIM [id number]  [ML]      ED Course User Index  [ML] Dyana Morrell PA                  MDM  Number of Diagnoses or Management Options  Closed fracture of distal end of right fibula, unspecified fracture morphology, initial encounter:      Amount and/or Complexity of Data Reviewed  Tests in the radiology section of CPT®: ordered and reviewed    Risk of Complications, Morbidity, and/or Mortality  Presenting problems: low  Diagnostic procedures: low  Management options: low    Patient Progress  Patient progress: improved      Final diagnoses:   Closed fracture of distal end of right fibula, unspecified fracture morphology, initial  encounter              Dyana Morrell PA  12/01/19 5421

## 2019-12-01 NOTE — ED NOTES
Called lead nurse Tahimna to see which tube to use on the T. Pallidum blood order. States to call lab. The order states to use red top, gel tub, or lavender. Spoke to Freddie in lab. States that any of the tubes that have the gel in the bottom should be fine. Will use the red top.       Catia Pardo RN  12/01/19 0491

## 2019-12-01 NOTE — ED NOTES
Discharge instructions reviewed with patient, patient instructed to return to ED if symptoms worsen or if any new problems arise. Patient verbalizes understanding of discharge instructions, patient out of ED on crutches with family. No acute distress noted.       Catia Pardo RN  12/01/19 6858

## 2019-12-01 NOTE — ED NOTES
Patient states he stepped in the mud last night and his right foot slid causing him to injure his right foot/ankle. Patient states he did not hit his head and there was no loss of consciousness.      Catia Pardo RN  12/01/19 1137

## 2019-12-03 ENCOUNTER — OFFICE VISIT (OUTPATIENT)
Dept: ORTHOPEDIC SURGERY | Facility: CLINIC | Age: 49
End: 2019-12-03

## 2019-12-03 ENCOUNTER — HOSPITAL ENCOUNTER (OUTPATIENT)
Dept: GENERAL RADIOLOGY | Facility: HOSPITAL | Age: 49
Discharge: HOME | End: 2019-12-03
Admitting: ORTHOPAEDIC SURGERY

## 2019-12-03 VITALS
WEIGHT: 240 LBS | HEART RATE: 90 BPM | HEIGHT: 68 IN | SYSTOLIC BLOOD PRESSURE: 120 MMHG | DIASTOLIC BLOOD PRESSURE: 84 MMHG | BODY MASS INDEX: 36.37 KG/M2

## 2019-12-03 DIAGNOSIS — S82.201A CLOSED FRACTURE OF RIGHT TIBIA AND FIBULA, INITIAL ENCOUNTER: Primary | ICD-10-CM

## 2019-12-03 DIAGNOSIS — S82.401A CLOSED FRACTURE OF RIGHT TIBIA AND FIBULA, INITIAL ENCOUNTER: Primary | ICD-10-CM

## 2019-12-03 DIAGNOSIS — S82.64XA CLOSED NONDISPLACED FRACTURE OF LATERAL MALLEOLUS OF RIGHT FIBULA, INITIAL ENCOUNTER: Primary | ICD-10-CM

## 2019-12-03 PROCEDURE — 27788 TREATMENT OF ANKLE FRACTURE: CPT | Performed by: ORTHOPAEDIC SURGERY

## 2019-12-03 PROCEDURE — 73610 X-RAY EXAM OF ANKLE: CPT | Performed by: RADIOLOGY

## 2019-12-03 PROCEDURE — 73610 X-RAY EXAM OF ANKLE: CPT

## 2019-12-03 NOTE — PROGRESS NOTES
"Patient: Austin Sneed    YOB: 1970    Chief Complaint   Patient presents with   • Right Ankle - Initial Evaluation, Pain, Edema         History of Present Illness: Patient presents for evaluation of his right ankle.  Apparently 2 days ago he slipped in the mud injuring his right ankle.  Was seen in the emergency room found to have a fracture and presents here for definitive care.  Denies any paresthesias.  Is a diabetic but he denies any neuropathy.  Does complain of pain    Past Medical History:   Diagnosis Date   • Diabetes mellitus (CMS/[facility])    • Hyperlipidemia    • Hypertension    • Sleep apnea         Social History     Socioeconomic History   • Marital status:      Spouse name: Not on file   • Number of children: Not on file   • Years of education: Not on file   • Highest education level: Not on file   Tobacco Use   • Smoking status: Former Smoker     Packs/day: 0.50     Types: Cigarettes     Last attempt to quit: 2015     Years since quittin.9   • Smokeless tobacco: Never Used   Substance and Sexual Activity   • Alcohol use: Yes     Comment: social   • Sexual activity: Defer           Physical Exam: 48 y.o. male  General Appearance:    Alert and oriented x 3, cooperative, in no acute distress                   Vitals:    19 0825   BP: 120/84   Pulse: 90   Weight: 109 kg (240 lb)   Height: 172.7 cm (68\")          Exam shows a mildly overweight white male in no obvious acute distress.  Right ankle shows bruising and swelling ecchymosis laterally with pain along the distal fibula.  Does have some mild tenderness along the deltoid ligament but minimal medial swelling.  Right foot otherwise is grossly neurovascularly intact.  Good range of motion of the knee without effusion      Radiology:       X-rays taken originally shows he is got a minimally displaced oblique fracture of the distal fibula approximately 2 mm displaced    Assessment/Plan:    Today we did a gentle manipulation " and placed him in a well molded short leg synthetic cast.  Postreduction x-rays showed excellent alignment of the fracture and the mortise appears to be in good position.  He is ambulate touchdown to nonweightbearing on this.  Did give him a prescription for rolling knee walker.  Also gave him a note for off work until further notice.  We will see him back in a week for follow-up x-ray and check            Patient's Body mass index is 36.49 kg/m². BMI is above normal parameters. Recommendations include: referral to primary care.      Discussion/Summary:                This chart was completed utilizing the dragon speech recognition software.  Grammatical errors, random word insertions, pronoun errors, and incomplete sentences or occasional consequences of the system due to software limitations, ambient noise, and hardware issues.  Any questions or concerns about the content, text, or information contained within the body of this dictation should be directly addressed to the physician for clarification        This document was signed by Fernando Gee M.D. December 3, 2019 9:25 AM

## 2019-12-06 ENCOUNTER — HOSPITAL ENCOUNTER (EMERGENCY)
Facility: HOSPITAL | Age: 49
Discharge: HOME | End: 2019-12-06
Attending: EMERGENCY MEDICINE | Admitting: EMERGENCY MEDICINE

## 2019-12-06 VITALS
HEART RATE: 92 BPM | WEIGHT: 240 LBS | BODY MASS INDEX: 36.37 KG/M2 | RESPIRATION RATE: 17 BRPM | OXYGEN SATURATION: 98 % | TEMPERATURE: 97.6 F | SYSTOLIC BLOOD PRESSURE: 151 MMHG | HEIGHT: 68 IN | DIASTOLIC BLOOD PRESSURE: 73 MMHG

## 2019-12-06 DIAGNOSIS — G89.18 POST-OPERATIVE PAIN: Primary | ICD-10-CM

## 2019-12-06 PROCEDURE — 99282 EMERGENCY DEPT VISIT SF MDM: CPT

## 2019-12-06 RX ORDER — HYDROCODONE BITARTRATE AND ACETAMINOPHEN 7.5; 325 MG/1; MG/1
1 TABLET ORAL EVERY 6 HOURS PRN
Qty: 12 TABLET | Refills: 0 | Status: SHIPPED | OUTPATIENT
Start: 2019-12-06 | End: 2019-12-16

## 2019-12-07 NOTE — ED PROVIDER NOTES
Subjective   Patient was placed in a cast on Tuesday.  He reports pain in the ankle.  He has not taken his pain medication.         Leg Pain   Location:  Leg  Injury: yes    Mechanism of injury: fall    Leg location:  R leg  Pain details:     Quality:  Aching and throbbing    Severity:  Moderate  Associated symptoms: no fever        Review of Systems   Constitutional: Negative.  Negative for fever.   HENT: Negative.    Respiratory: Negative.    Cardiovascular: Negative.  Negative for chest pain.   Gastrointestinal: Negative.  Negative for abdominal pain.   Endocrine: Negative.    Genitourinary: Negative.  Negative for dysuria.   Skin: Negative.    Neurological: Negative.    Psychiatric/Behavioral: Negative.    All other systems reviewed and are negative.      Past Medical History:   Diagnosis Date   • Diabetes mellitus (CMS/[facility])    • Hyperlipidemia    • Hypertension    • Sleep apnea        Allergies   Allergen Reactions   • Penicillins Hives       Past Surgical History:   Procedure Laterality Date   • CORONARY STENT PLACEMENT  2015    x1 stent [facility]       Family History   Problem Relation Age of Onset   • Heart disease Father    • Heart attack Maternal Grandmother        Social History     Socioeconomic History   • Marital status:      Spouse name: Not on file   • Number of children: Not on file   • Years of education: Not on file   • Highest education level: Not on file   Tobacco Use   • Smoking status: Former Smoker     Packs/day: 0.50     Types: Cigarettes     Last attempt to quit: 2015     Years since quittin.9   • Smokeless tobacco: Never Used   Substance and Sexual Activity   • Alcohol use: Yes     Comment: social   • Sexual activity: Defer           Objective   Physical Exam   Constitutional: He is oriented to person, place, and time. He appears well-developed and well-nourished. No distress.   HENT:   Head: Normocephalic and atraumatic.   Right Ear: External ear normal.   Left Ear:  External ear normal.   Nose: Nose normal.   Eyes: Conjunctivae and EOM are normal. Pupils are equal, round, and reactive to light.   Neck: Normal range of motion. Neck supple. No JVD present. No tracheal deviation present.   Cardiovascular: Normal rate, regular rhythm and normal heart sounds.   No murmur heard.  Pulmonary/Chest: Effort normal and breath sounds normal. No respiratory distress. He has no wheezes.   Abdominal: Soft. Bowel sounds are normal. There is no tenderness.   Musculoskeletal: Normal range of motion. He exhibits no edema or deformity.   Neurological: He is alert and oriented to person, place, and time. No cranial nerve deficit.   Skin: Skin is warm and dry. Capillary refill takes less than 2 seconds. No rash noted. He is not diaphoretic. No erythema. No pallor.        Psychiatric: He has a normal mood and affect. His behavior is normal. Thought content normal.   Nursing note and vitals reviewed.      Procedures           ED Course                  MDM  Number of Diagnoses or Management Options  Post-operative pain: minor      Final diagnoses:   Post-operative pain              Stephania Rahman, LIAM  12/07/19 0000

## 2019-12-09 ENCOUNTER — HOSPITAL ENCOUNTER (EMERGENCY)
Facility: HOSPITAL | Age: 49
Discharge: HOME | End: 2019-12-09
Attending: FAMILY MEDICINE | Admitting: FAMILY MEDICINE

## 2019-12-09 VITALS
OXYGEN SATURATION: 98 % | RESPIRATION RATE: 20 BRPM | SYSTOLIC BLOOD PRESSURE: 141 MMHG | BODY MASS INDEX: 36.37 KG/M2 | HEIGHT: 68 IN | HEART RATE: 95 BPM | DIASTOLIC BLOOD PRESSURE: 86 MMHG | WEIGHT: 240 LBS | TEMPERATURE: 98 F

## 2019-12-09 DIAGNOSIS — Z46.89 CAST REMOVAL: Primary | ICD-10-CM

## 2019-12-09 PROCEDURE — 99283 EMERGENCY DEPT VISIT LOW MDM: CPT

## 2019-12-09 NOTE — ED PROVIDER NOTES
Subjective   48-year-old male patient states that he can no longer mentally handle having a cast on his leg and is demanding it to be removed.  Patient was seen by Dr. Gee last week and had the cast placed.  Patient has a distal fibula fracture that he sustained after a fall.      History provided by:  Patient   used: No        Review of Systems   Constitutional: Negative.    HENT: Negative.    Eyes: Negative.    Respiratory: Negative.    Cardiovascular: Negative.    Gastrointestinal: Negative.    Endocrine: Negative.    Genitourinary: Negative.    Musculoskeletal: Negative.    Skin: Negative.    Allergic/Immunologic: Negative.    Neurological: Negative.    Hematological: Negative.    Psychiatric/Behavioral: Negative.    All other systems reviewed and are negative.      Past Medical History:   Diagnosis Date   • Diabetes mellitus (CMS/[facility])    • Hyperlipidemia    • Hypertension    • Sleep apnea        Allergies   Allergen Reactions   • Penicillins Hives       Past Surgical History:   Procedure Laterality Date   • CORONARY STENT PLACEMENT  2015    x1 stent [facility]       Family History   Problem Relation Age of Onset   • Heart disease Father    • Heart attack Maternal Grandmother        Social History     Socioeconomic History   • Marital status:      Spouse name: Not on file   • Number of children: Not on file   • Years of education: Not on file   • Highest education level: Not on file   Tobacco Use   • Smoking status: Former Smoker     Packs/day: 0.50     Types: Cigarettes     Last attempt to quit: 2015     Years since quittin.9   • Smokeless tobacco: Never Used   Substance and Sexual Activity   • Alcohol use: Yes     Comment: social   • Sexual activity: Defer           Objective   Physical Exam   Constitutional: He is oriented to person, place, and time. He appears well-developed and well-nourished.   HENT:   Head: Normocephalic and atraumatic.   Right Ear: External ear  normal.   Left Ear: External ear normal.   Nose: Nose normal.   Mouth/Throat: Oropharynx is clear and moist.   Eyes: Pupils are equal, round, and reactive to light. Conjunctivae and EOM are normal.   Neck: Normal range of motion. Neck supple.   Cardiovascular: Normal rate, regular rhythm, normal heart sounds and intact distal pulses.   Pulmonary/Chest: Effort normal and breath sounds normal.   Abdominal: Soft. Bowel sounds are normal.   Musculoskeletal: Normal range of motion.   Neurological: He is alert and oriented to person, place, and time.   Skin: Skin is warm and dry. Capillary refill takes less than 2 seconds.   Nursing note and vitals reviewed.      Procedures           ED Course  ED Course as of Dec 09 0437   Mon Dec 09, 2019   0436 Cast removed.  Pt instructed to f/u with Dr. Gee.     [ML]      ED Course User Index  [ML] Dyana Morrell PA                      No data recorded                        MDM    Final diagnoses:   Cast removal              Dyana Morrell PA  12/09/19 3300

## 2019-12-10 ENCOUNTER — HOSPITAL ENCOUNTER (OUTPATIENT)
Dept: GENERAL RADIOLOGY | Facility: HOSPITAL | Age: 49
Discharge: HOME | End: 2019-12-10
Admitting: ORTHOPAEDIC SURGERY

## 2019-12-10 ENCOUNTER — OFFICE VISIT (OUTPATIENT)
Dept: ORTHOPEDIC SURGERY | Facility: CLINIC | Age: 49
End: 2019-12-10

## 2019-12-10 VITALS — HEIGHT: 68 IN | BODY MASS INDEX: 36.42 KG/M2 | WEIGHT: 240.3 LBS

## 2019-12-10 DIAGNOSIS — S82.64XA CLOSED NONDISPLACED FRACTURE OF LATERAL MALLEOLUS OF RIGHT FIBULA, INITIAL ENCOUNTER: Primary | ICD-10-CM

## 2019-12-10 DIAGNOSIS — S82.64XA CLOSED NONDISPLACED FRACTURE OF LATERAL MALLEOLUS OF RIGHT FIBULA, INITIAL ENCOUNTER: ICD-10-CM

## 2019-12-10 PROCEDURE — 73610 X-RAY EXAM OF ANKLE: CPT | Performed by: RADIOLOGY

## 2019-12-10 PROCEDURE — 99024 POSTOP FOLLOW-UP VISIT: CPT | Performed by: ORTHOPAEDIC SURGERY

## 2019-12-10 PROCEDURE — 73610 X-RAY EXAM OF ANKLE: CPT

## 2019-12-10 NOTE — PROGRESS NOTES
"Patient: Austin Sneed    YOB: 1970    Chief Complaint   Patient presents with   • Right Ankle - Fracture, Follow-up         History of Present Illness:     Past Medical History:   Diagnosis Date   • Diabetes mellitus (CMS/HCC)    • Hyperlipidemia    • Hypertension    • Sleep apnea         Social History     Socioeconomic History   • Marital status:      Spouse name: Not on file   • Number of children: Not on file   • Years of education: Not on file   • Highest education level: Not on file   Tobacco Use   • Smoking status: Former Smoker     Packs/day: 0.50     Types: Cigarettes     Last attempt to quit: 2015     Years since quittin.9   • Smokeless tobacco: Never Used   Substance and Sexual Activity   • Alcohol use: Yes     Comment: social   • Sexual activity: Defer           Physical Exam: 48 y.o. male  General Appearance:    Alert and oriented x 3, cooperative, in no acute distress                   Vitals:    12/10/19 0806   Weight: 109 kg (240 lb 4.8 oz)   Height: 172.7 cm (67.99\")                Radiology:             Assessment/Plan:      Austin Sneed is a current {Tobacco Types:9781248747} user.  He currently { [employer] SMOKES/CHEWS/USES:8552320090::\"smokes\"} {NUMBER:72710} { [employer] TYPES OF TOBACCO:8613351706::\"pack of cigarettes\"} per day for a duration of { [employer] NUMBERS:8636098254} {month/year:86431}. I have educated him on the risk of diseases from using tobacco products such as {Tobacco Cessation Diseases:85691::\"cancer\",\"COPD\",\"heart diease\"}.     I advised him to quit and he is {Willing/Not Willing to Quit Tobacco Products:70338}.    I spent {Time Spent Tobacco :66832} minutes counseling the patient.            Patient's Body mass index is 36.55 kg/m². BMI is {BMI range:27536}.      Discussion/Summary:                This chart was completed utilizing the dragon speech recognition software.  Grammatical errors, random word insertions, pronoun errors, and incomplete sentences or " "occasional consequences of the system due to software limitations, ambient noise, and hardware issues.  Any questions or concerns about the content, text, or information contained within the body of this dictation should be directly addressed to the physician for clarification        This document was signed by Fernando Gee M.D. December 10, 2019 8:36 AMPatient: Austin Sneed    YOB: 1970    Chief Complaint   Patient presents with   • Right Ankle - Fracture, Follow-up         History of Present Illness: Patient is a 48-year-old white male presents for follow-up of his right ankle.  Apparently he cannot tolerate the cast he went to the emergency room and had a taken off over the weekend.  They put a splint on him.    Past Medical History:   Diagnosis Date   • Diabetes mellitus (CMS/[facility])    • Hyperlipidemia    • Hypertension    • Sleep apnea         Social History     Socioeconomic History   • Marital status:      Spouse name: Not on file   • Number of children: Not on file   • Years of education: Not on file   • Highest education level: Not on file   Tobacco Use   • Smoking status: Former Smoker     Packs/day: 0.50     Types: Cigarettes     Last attempt to quit: 2015     Years since quittin.9   • Smokeless tobacco: Never Used   Substance and Sexual Activity   • Alcohol use: Yes     Comment: social   • Sexual activity: Defer           Physical Exam: 48 y.o. male  General Appearance:    Alert and oriented x 3, cooperative, in no acute distress                   Vitals:    12/10/19 0806   Weight: 109 kg (240 lb 4.8 oz)   Height: 172.7 cm (67.99\")          Abner is essentially unchanged.  The skin is intact.  He has some moderate lateral swelling and resolving ecchymosis      Radiology:       X-rays taken today show there is been a slight change in alignment of the fracture it looks like it is a little bit more displaced compared to last week after we did the closed reduction and " casting      Assessment/Plan: Distal fibula fracture.  Will likely need open reduction internal fixation as he looks like he is about 3 mm of displacement.  Have discussed situation I am leaving the practice on Thursday this week so we will have him see Dr. Foote tomorrow.  We put him in the boot today.  He is to continue nonweightbearing on this.  Talked about possibility of nonoperative treatment for this also.            Discussion/Summary:                This chart was completed utilizing the dragon speech recognition software.  Grammatical errors, random word insertions, pronoun errors, and incomplete sentences or occasional consequences of the system due to software limitations, ambient noise, and hardware issues.  Any questions or concerns about the content, text, or information contained within the body of this dictation should be directly addressed to the physician for clarification        This document was signed by Fernando Gee M.D. December 10, 2019 8:36 AMPatient: Austin Sneed    YOB: 1970    Chief Complaint   Patient presents with   • Right Ankle - Fracture, Follow-up         History of Present Illness:     Past Medical History:   Diagnosis Date   • Diabetes mellitus (CMS/[facility])    • Hyperlipidemia    • Hypertension    • Sleep apnea         Social History     Socioeconomic History   • Marital status:      Spouse name: Not on file   • Number of children: Not on file   • Years of education: Not on file   • Highest education level: Not on file   Tobacco Use   • Smoking status: Former Smoker     Packs/day: 0.50     Types: Cigarettes     Last attempt to quit: 2015     Years since quittin.9   • Smokeless tobacco: Never Used   Substance and Sexual Activity   • Alcohol use: Yes     Comment: social   • Sexual activity: Defer           Physical Exam: 48 y.o. male  General Appearance:    Alert and oriented x 3, cooperative, in no acute distress                   Vitals:     "12/10/19 0806   Weight: 109 kg (240 lb 4.8 oz)   Height: 172.7 cm (67.99\")                Radiology:             Assessment/Plan:            Discussion/Summary:                This chart was completed utilizing the dragon speech recognition software.  Grammatical errors, random word insertions, pronoun errors, and incomplete sentences or occasional consequences of the system due to software limitations, ambient noise, and hardware issues.  Any questions or concerns about the content, text, or information contained within the body of this dictation should be directly addressed to the physician for clarification        This document was signed by Fernando Gee M.D. December 10, 2019 8:36 AMPatient: Austin Sneed    YOB: 1970    Chief Complaint   Patient presents with   • Right Ankle - Fracture, Follow-up         History of Present Illness:     Past Medical History:   Diagnosis Date   • Diabetes mellitus (CMS/HCC)    • Hyperlipidemia    • Hypertension    • Sleep apnea         Social History     Socioeconomic History   • Marital status:      Spouse name: Not on file   • Number of children: Not on file   • Years of education: Not on file   • Highest education level: Not on file   Tobacco Use   • Smoking status: Former Smoker     Packs/day: 0.50     Types: Cigarettes     Last attempt to quit: 2015     Years since quittin.9   • Smokeless tobacco: Never Used   Substance and Sexual Activity   • Alcohol use: Yes     Comment: social   • Sexual activity: Defer           Physical Exam: 48 y.o. male  General Appearance:    Alert and oriented x 3, cooperative, in no acute distress                   Vitals:    12/10/19 0806   Weight: 109 kg (240 lb 4.8 oz)   Height: 172.7 cm (67.99\")                Radiology:             Assessment/Plan:        Discussion/Summary:                This chart was completed utilizing the dragon speech recognition software.  Grammatical errors, random word insertions, " pronoun errors, and incomplete sentences or occasional consequences of the system due to software limitations, ambient noise, and hardware issues.  Any questions or concerns about the content, text, or information contained within the body of this dictation should be directly addressed to the physician for clarification        This document was signed by Fernando Gee M.D. December 10, 2019 8:36 AM

## 2019-12-10 NOTE — PROGRESS NOTES
"Patient: Austin Sneed    YOB: 1970    Chief Complaint   Patient presents with   • Right Ankle - Fracture, Follow-up         History of Present Illness:     Past Medical History:   Diagnosis Date   • Diabetes mellitus (CMS/HCC)    • Hyperlipidemia    • Hypertension    • Sleep apnea         Social History     Socioeconomic History   • Marital status:      Spouse name: Not on file   • Number of children: Not on file   • Years of education: Not on file   • Highest education level: Not on file   Tobacco Use   • Smoking status: Former Smoker     Packs/day: 0.50     Types: Cigarettes     Last attempt to quit: 2015     Years since quittin.9   • Smokeless tobacco: Never Used   Substance and Sexual Activity   • Alcohol use: Yes     Comment: social   • Sexual activity: Defer           Physical Exam: 48 y.o. male  General Appearance:    Alert and oriented x 3, cooperative, in no acute distress                   Vitals:    12/10/19 0806   Weight: 109 kg (240 lb 4.8 oz)   Height: 172.7 cm (67.99\")                Radiology:             Assessment/Plan:      Austin Sneed is a current {Tobacco Types:4444352575} user.  He currently { [employer] SMOKES/CHEWS/USES:6661707588::\"smokes\"} {NUMBER:07646} { [employer] TYPES OF TOBACCO:7201238158::\"pack of cigarettes\"} per day for a duration of { [employer] NUMBERS:5588612957} {month/year:33917}. I have educated him on the risk of diseases from using tobacco products such as {Tobacco Cessation Diseases:30043::\"cancer\",\"COPD\",\"heart diease\"}.     I advised him to quit and he is {Willing/Not Willing to Quit Tobacco Products:16287}.    I spent {Time Spent Tobacco :16776} minutes counseling the patient.            Patient's Body mass index is 36.55 kg/m². BMI is {BMI range:91500}.      Discussion/Summary:                This chart was completed utilizing the dragon speech recognition software.  Grammatical errors, random word insertions, pronoun errors, and incomplete sentences or " "occasional consequences of the system due to software limitations, ambient noise, and hardware issues.  Any questions or concerns about the content, text, or information contained within the body of this dictation should be directly addressed to the physician for clarification        This document was signed by Fernando Gee M.D. December 10, 2019 8:34 AMPatient: Austin Sneed    YOB: 1970    Chief Complaint   Patient presents with   • Right Ankle - Fracture, Follow-up         History of Present Illness: Patient is a 48-year-old white male presents for follow-up of his right ankle.  Apparently he cannot tolerate the cast he went to the emergency room and had a taken off over the weekend.  They put a splint on him.    Past Medical History:   Diagnosis Date   • Diabetes mellitus (CMS/[facility])    • Hyperlipidemia    • Hypertension    • Sleep apnea         Social History     Socioeconomic History   • Marital status:      Spouse name: Not on file   • Number of children: Not on file   • Years of education: Not on file   • Highest education level: Not on file   Tobacco Use   • Smoking status: Former Smoker     Packs/day: 0.50     Types: Cigarettes     Last attempt to quit: 2015     Years since quittin.9   • Smokeless tobacco: Never Used   Substance and Sexual Activity   • Alcohol use: Yes     Comment: social   • Sexual activity: Defer           Physical Exam: 48 y.o. male  General Appearance:    Alert and oriented x 3, cooperative, in no acute distress                   Vitals:    12/10/19 0806   Weight: 109 kg (240 lb 4.8 oz)   Height: 172.7 cm (67.99\")          Abner is essentially unchanged.  The skin is intact.  He has some moderate lateral swelling and resolving ecchymosis      Radiology:       X-rays taken today show there is been a slight change in alignment of the fracture it looks like it is a little bit more displaced compared to last week after we did the closed reduction and " casting      Assessment/Plan: Distal fibula fracture.  Will likely need open reduction internal fixation as he looks like he is about 3 mm of displacement.  Have discussed situation I am leaving the practice on Thursday this week so we will have him see Dr. Foote tomorrow.  We put him in the boot today.  He is to continue nonweightbearing on this.  Talked about possibility of nonoperative treatment for this also.            Discussion/Summary:                This chart was completed utilizing the dragon speech recognition software.  Grammatical errors, random word insertions, pronoun errors, and incomplete sentences or occasional consequences of the system due to software limitations, ambient noise, and hardware issues.  Any questions or concerns about the content, text, or information contained within the body of this dictation should be directly addressed to the physician for clarification        This document was signed by Fernando Gee M.D. December 10, 2019 8:28 AMPatient: Austin Sneed    YOB: 1970    Chief Complaint   Patient presents with   • Right Ankle - Fracture, Follow-up         History of Present Illness:     Past Medical History:   Diagnosis Date   • Diabetes mellitus (CMS/[facility])    • Hyperlipidemia    • Hypertension    • Sleep apnea         Social History     Socioeconomic History   • Marital status:      Spouse name: Not on file   • Number of children: Not on file   • Years of education: Not on file   • Highest education level: Not on file   Tobacco Use   • Smoking status: Former Smoker     Packs/day: 0.50     Types: Cigarettes     Last attempt to quit: 2015     Years since quittin.9   • Smokeless tobacco: Never Used   Substance and Sexual Activity   • Alcohol use: Yes     Comment: social   • Sexual activity: Defer           Physical Exam: 48 y.o. male  General Appearance:    Alert and oriented x 3, cooperative, in no acute distress                   Vitals:     "12/10/19 0806   Weight: 109 kg (240 lb 4.8 oz)   Height: 172.7 cm (67.99\")                Radiology:             Assessment/Plan:            Discussion/Summary:                This chart was completed utilizing the dragon speech recognition software.  Grammatical errors, random word insertions, pronoun errors, and incomplete sentences or occasional consequences of the system due to software limitations, ambient noise, and hardware issues.  Any questions or concerns about the content, text, or information contained within the body of this dictation should be directly addressed to the physician for clarification        This document was signed by Fernando Gee M.D. December 10, 2019 8:24 AMPatient: Austin Sneed    YOB: 1970    Chief Complaint   Patient presents with   • Right Ankle - Fracture, Follow-up         History of Present Illness:     Past Medical History:   Diagnosis Date   • Diabetes mellitus (CMS/HCC)    • Hyperlipidemia    • Hypertension    • Sleep apnea         Social History     Socioeconomic History   • Marital status:      Spouse name: Not on file   • Number of children: Not on file   • Years of education: Not on file   • Highest education level: Not on file   Tobacco Use   • Smoking status: Former Smoker     Packs/day: 0.50     Types: Cigarettes     Last attempt to quit: 2015     Years since quittin.9   • Smokeless tobacco: Never Used   Substance and Sexual Activity   • Alcohol use: Yes     Comment: social   • Sexual activity: Defer           Physical Exam: 48 y.o. male  General Appearance:    Alert and oriented x 3, cooperative, in no acute distress                   Vitals:    12/10/19 0806   Weight: 109 kg (240 lb 4.8 oz)   Height: 172.7 cm (67.99\")                Radiology:             Assessment/Plan:        Discussion/Summary:                This chart was completed utilizing the dragon speech recognition software.  Grammatical errors, random word insertions, " pronoun errors, and incomplete sentences or occasional consequences of the system due to software limitations, ambient noise, and hardware issues.  Any questions or concerns about the content, text, or information contained within the body of this dictation should be directly addressed to the physician for clarification        This document was signed by Fernando Gee M.D. December 10, 2019 8:23 AM

## 2019-12-10 NOTE — PROGRESS NOTES
"Patient: Austin Sneed    YOB: 1970    Chief Complaint   Patient presents with   • Right Ankle - Fracture, Follow-up         History of Present Illness: 48-year-old white male who presents for follow-up status right distal fibula fracture.  Apparently could not tolerate the cast was went to the emergency room had it removed over the weekend.  No other complaints he presents in a small splint    Past Medical History:   Diagnosis Date   • Diabetes mellitus (CMS/HCC)    • Hyperlipidemia    • Hypertension    • Sleep apnea         Social History     Socioeconomic History   • Marital status:      Spouse name: Not on file   • Number of children: Not on file   • Years of education: Not on file   • Highest education level: Not on file   Tobacco Use   • Smoking status: Former Smoker     Packs/day: 0.50     Types: Cigarettes     Last attempt to quit: 2015     Years since quittin.9   • Smokeless tobacco: Never Used   Substance and Sexual Activity   • Alcohol use: Yes     Comment: social   • Sexual activity: Defer           Physical Exam: 48 y.o. male  General Appearance:    Alert and oriented x 3, cooperative, in no acute distress                   Vitals:    12/10/19 0806   Weight: 109 kg (240 lb 4.8 oz)   Height: 172.7 cm (67.99\")          Skin is intact.  Some resolving ecchymosis and mild swelling noted grossly neurovascular intact      Radiology:       X-ray shows there looks like there is been a little bit of shift in the fracture      Assessment/Plan: Distal fibular fracture.  Last week we did a gentle closed reduction and placed him in a well molded cast and the postreduction x-rays actually look pretty good with maybe a 2 mm displacement.  He had the cast removed x-rays today show possibly a 3 or 4 mm displacement looks like there is been slight lateralization of the mortise.  Discussed situation with the patient I am going to have him see Dr. Foote tomorrow for possible open reduction " internal fixation.  Discussed the possibility of seeing a psychiatrist if he is unable to tolerate casting or immobilization          Discussion/Summary:                This chart was completed utilizing the dragon speech recognition software.  Grammatical errors, random word insertions, pronoun errors, and incomplete sentences or occasional consequences of the system due to software limitations, ambient noise, and hardware issues.  Any questions or concerns about the content, text, or information contained within the body of this dictation should be directly addressed to the physician for clarification        This document was signed by Fernando Gee M.D. December 10, 2019 8:38 AM

## 2019-12-11 ENCOUNTER — OFFICE VISIT (OUTPATIENT)
Dept: ORTHOPEDIC SURGERY | Facility: CLINIC | Age: 49
End: 2019-12-11

## 2019-12-11 ENCOUNTER — OFFICE VISIT (OUTPATIENT)
Dept: CARDIOLOGY | Facility: CLINIC | Age: 49
End: 2019-12-11

## 2019-12-11 VITALS
DIASTOLIC BLOOD PRESSURE: 81 MMHG | BODY MASS INDEX: 36.28 KG/M2 | SYSTOLIC BLOOD PRESSURE: 124 MMHG | HEART RATE: 89 BPM | WEIGHT: 239.4 LBS | HEIGHT: 68 IN

## 2019-12-11 VITALS
SYSTOLIC BLOOD PRESSURE: 133 MMHG | BODY MASS INDEX: 36.42 KG/M2 | HEIGHT: 68 IN | DIASTOLIC BLOOD PRESSURE: 89 MMHG | WEIGHT: 240.3 LBS | HEART RATE: 90 BPM

## 2019-12-11 DIAGNOSIS — I10 ESSENTIAL HYPERTENSION: ICD-10-CM

## 2019-12-11 DIAGNOSIS — E66.01 MORBIDLY OBESE (HCC): ICD-10-CM

## 2019-12-11 DIAGNOSIS — Z01.810 PREOPERATIVE CARDIOVASCULAR EXAMINATION: ICD-10-CM

## 2019-12-11 DIAGNOSIS — S82.61XA DISPLACED FRACTURE OF LATERAL MALLEOLUS OF RIGHT FIBULA, INITIAL ENCOUNTER FOR CLOSED FRACTURE: Primary | ICD-10-CM

## 2019-12-11 DIAGNOSIS — E11.9 TYPE 2 DIABETES MELLITUS WITHOUT COMPLICATION, WITHOUT LONG-TERM CURRENT USE OF INSULIN (HCC): ICD-10-CM

## 2019-12-11 DIAGNOSIS — I25.10 ASCVD (ARTERIOSCLEROTIC CARDIOVASCULAR DISEASE): Primary | ICD-10-CM

## 2019-12-11 PROCEDURE — 93000 ELECTROCARDIOGRAM COMPLETE: CPT | Performed by: INTERNAL MEDICINE

## 2019-12-11 PROCEDURE — 99024 POSTOP FOLLOW-UP VISIT: CPT | Performed by: ORTHOPAEDIC SURGERY

## 2019-12-11 PROCEDURE — 99214 OFFICE O/P EST MOD 30 MIN: CPT | Performed by: INTERNAL MEDICINE

## 2019-12-11 NOTE — PROGRESS NOTES
Doris Melo, LIAM  Austin Sneed  1970 12/11/2019    Patient Active Problem List   Diagnosis   • Precordial pain   • Dyspnea on exertion   • ASCVD (arteriosclerotic cardiovascular disease), status post stenting about 3 years ago in [facility].    • Chest pain   • Erectile dysfunction   • Closed nondisplaced fracture of lateral malleolus of right fibula       Dear Doris Melo, APRN:    Subjective     Austin Sneed is a 48 y.o. male with the problems as listed above, presents    Chief complaint: Follow-up of coronary artery disease and preoperative cardiac evaluation for ankle fracture surgery.    History of Present Illness: Mr. Sneed is a pleasant 48-year-old  male with history of known ASCVD, status post stenting about 4 to 5 years ago with stable clinical course since then from cardiac standpoint.  He now presents with recent right ankle fracture and is seeking cardiac clearance prior to undergoing the surgery for the same.  On further questioning he denies any complaints of chest pains, discomfort or shortness of breath in a long time.  He says in fact he has been doing much better recently since he has lost about 30 pounds since April.    Allergies   Allergen Reactions   • Penicillins Hives   :      Current Outpatient Medications:   •  aspirin 81 MG EC tablet, Take 243 mg by mouth Daily. Takes 3 tabs per dose, Disp: , Rfl:   •  atorvastatin (LIPITOR) 20 MG tablet, Take 20 mg by mouth Daily., Disp: , Rfl:   •  clopidogrel (PLAVIX) 75 MG tablet, Take 75 mg by mouth Daily., Disp: , Rfl:   •  HYDROcodone-acetaminophen (NORCO) 7.5-325 MG per tablet, Take 1 tablet by mouth Every 6 (Six) Hours As Needed for Moderate Pain ., Disp: 12 tablet, Rfl: 0  •  metFORMIN ER (GLUCOPHAGE-XR) 500 MG 24 hr tablet, Take 500 mg by mouth Daily., Disp: , Rfl:   •  metoprolol succinate XL (TOPROL-XL) 50 MG 24 hr tablet, Take 50 mg by mouth Daily., Disp: , Rfl:   •  naproxen (NAPROSYN) 500 MG tablet, Take 500  "mg by mouth Daily., Disp: , Rfl:   •  albuterol (PROVENTIL HFA;VENTOLIN HFA) 108 (90 Base) MCG/ACT inhaler, Inhale 2 puffs Every 6 (Six) Hours As Needed for Wheezing or Shortness of Air., Disp: , Rfl:   •  Blood Glucose Monitoring Suppl (TRUE METRIX METER) w/Device kit, Use as directed to test Blood Sugar., Disp: 1 kit, Rfl: 0  •  glucose blood test strip, Use as directed to test Blood Sugar 2 times a day., Disp: 50 each, Rfl: 0  •  TRUEPLUS LANCETS 28G misc, Use as Directed to test Blood Sugar 2 times a day, Disp: 50 each, Rfl: 0      The following portions of the patient's history were reviewed and updated as appropriate: allergies, current medications, past family history, past medical history, past social history, past surgical history and problem list.    Social History     Tobacco Use   • Smoking status: Former Smoker     Packs/day: 0.50     Types: Cigarettes     Last attempt to quit: 2015     Years since quittin.9   • Smokeless tobacco: Never Used   Substance Use Topics   • Alcohol use: Yes     Comment: social   • Drug use: Not on file       Review of Systems   Constitution: Negative for chills and fever.   Cardiovascular: Negative for chest pain, palpitations and syncope.   Respiratory: Negative for shortness of breath.    Neurological: Negative for dizziness.       Objective   Vitals:    19 1215   BP: 124/81   BP Location: Right arm   Patient Position: Sitting   Cuff Size: Adult   Pulse: 89   Weight: 109 kg (239 lb 6.4 oz)   Height: 172.7 cm (67.99\")     Body mass index is 36.41 kg/m².        Physical Exam   Constitutional: He is oriented to person, place, and time. He appears well-developed and well-nourished.   HENT:   Mouth/Throat: Oropharynx is clear and moist.   Eyes: Pupils are equal, round, and reactive to light. EOM are normal.   Neck: Neck supple. No JVD present. No tracheal deviation present. No thyromegaly present.   Cardiovascular: Normal rate, regular rhythm, S1 normal and S2 normal. " Exam reveals no gallop and no friction rub.   No murmur heard.  Pulmonary/Chest: Effort normal and breath sounds normal.   Abdominal: Soft. Bowel sounds are normal. He exhibits no mass. There is no tenderness.   Musculoskeletal: Normal range of motion. He exhibits no edema.   Right foot is in splint.   Lymphadenopathy:     He has no cervical adenopathy.   Neurological: He is alert and oriented to person, place, and time.   Skin: Skin is warm and dry. No rash noted.   Psychiatric: He has a normal mood and affect.       Lab Results   Component Value Date     2019    K 4.4 2019     2019    CO2 21.0 (L) 2019    BUN 16 2019    CREATININE 0.97 2019    GLUCOSE 112 (H) 2019    CALCIUM 9.4 2019    AST 14 2019    ALT 24 2019    ALKPHOS 44 2019     Lab Results   Component Value Date    CKTOTAL 132 2018     Lab Results   Component Value Date    WBC 8.45 2019    HGB 15.0 2019    HCT 50.0 2019     2019     No results found for: INR  No results found for: MG  Lab Results   Component Value Date    TRIG 97 2019    HDL 35 (L) 2019    LDL 43 2019      Lab Results   Component Value Date    BNP <2.0 2018     Austin Sneed   Regadenoson Stress Test With Myocardial Perfusion SPECT (Multi Study)   Order# [id number]   Reading physician: Elias Castillo MD Ordering physician: Elias Castillo MD Study date: 18   Patient Information     Patient Name  Austin Sneed MRN  [id number] Sex  Male  (Age)  1970 (48 y.o.)   Interpretation Summary     · Findings consistent with a normal ECG stress test.  · Myocardial perfusion imaging indicates a normal myocardial perfusion study with no evidence of ischemia.  · Normal LV cavity size. Normal LV wall motion noted.  · Left ventricular ejection fraction is normal (Calculated EF = 63%).  · Impressions are consistent with a low risk study.     Austin Sneed    Echocardiogram   Order# [id number]   Reading physician: Elias Castillo MD Ordering physician: Elias Castillo MD Study date: 18   Patient Information     Patient Name  Austin Sneed MRN  [id number] Sex  Male  (Age)  1970 (48 y.o.)   Sedation Narrator Report     Interpretation Summary     · Normal left ventricular cavity size and wall thickness noted. All left ventricular wall segments contract normally.  · Estimated EF appears to be in the range of 61 - 65%.  · Left ventricular diastolic dysfunction (grade I) consistent with impaired relaxation.  · The aortic valve is structurally normal. No aortic valve regurgitation is present. No aortic valve stenosis is present.  · The mitral valve is normal in structure. No mitral valve regurgitation is present. No significant mitral valve stenosis is present.  · The tricuspid valve is normal. No tricuspid valve stenosis is present. No tricuspid valve regurgitation is present.  · There is no evidence of pericardial effusion.         ECG 12 Lead  Date/Time: 2019 12:16 PM  Performed by: Lillie Gore CMA  Authorized by: Elias Castillo MD   Comparison: compared with previous ECG from 2018  Similar to previous ECG  Rhythm: sinus rhythm  BPM: 91  Conduction: conduction normal  ST Segments: ST segments normal  T Waves: T waves normal    Clinical impression: normal ECG                 Assessment/Plan    Diagnosis Plan   1. ASCVD (arteriosclerotic cardiovascular disease), status post stenting about 3 years ago in [facility], clinically stable..      2. Preoperative cardiovascular examination     3. Essential hypertension, controlled.          Recommendations:  1. Since his cardiac status appears to have been stable for a long time, he should be an acceptable cardiac risk for the ankle surgery as needed.  2. May hold the Plavix 5 days before and restart whenever safe after surgery.  3. With regards to aspirin I would prefer to continue it before  and after surgery if possible.  If there is a high bleeding risk, may hold this for 3 days prior to surgery and restart as soon as possible after surgery.  4. Continue with this metoprolol succinate and atorvastatin preoperatively.    Return in about 3 months (around 3/11/2020).    As always, Doris Melo APRN  I appreciate very much the opportunity to participate in the cardiovascular care of your patients. Please do not hesitate to call me with any questions with regards to Austin Sneed's evaluation and management.           With Best Regards,        Elias Castillo MD, [facility]    Dragon disclaimer:  Much of this encounter note is an electronic transcription/translation of spoken language to printed text. The electronic translation of spoken language may permit erroneous, or at times, nonsensical words or phrases to be inadvertently transcribed; Although I have reviewed the note for such errors, some may still exist.

## 2019-12-11 NOTE — PROGRESS NOTES
History and Physical      Patient: Austin Sneed  YOB: 1970  Date of Encounter: 2019      Chief Complaint:   Chief Complaint   Patient presents with   • Right Ankle - Pain, Edema, Fracture, Follow-up         HPI:   Austin Sneed, 48 y.o. male, seen today for evaluation of right ankle fracture injury date of 2019 when he slipped and fell.  He was initially placed in anterior splint and this was then changed to cast however he removed his cast.  He presents today with walking boot. He is an over the road .    Active Problem List:  Patient Active Problem List   Diagnosis   • Precordial pain   • Dyspnea on exertion   • ASCVD (arteriosclerotic cardiovascular disease), status post stenting about 3 years ago in [facility].    • Chest pain   • Erectile dysfunction   • Closed nondisplaced fracture of lateral malleolus of right fibula         Past Medical History:  Past Medical History:   Diagnosis Date   • Diabetes mellitus (CMS/[facility])    • Hyperlipidemia    • Hypertension    • Sleep apnea          Past Surgical History:  Past Surgical History:   Procedure Laterality Date   • CORONARY STENT PLACEMENT  2015    x1 stent [place] [facility]         Family History:  Family History   Problem Relation Age of Onset   • Heart disease Father    • Heart attack Maternal Grandmother          Social History:  Social History     Socioeconomic History   • Marital status:      Spouse name: Not on file   • Number of children: Not on file   • Years of education: Not on file   • Highest education level: Not on file   Tobacco Use   • Smoking status: Former Smoker     Packs/day: 0.50     Types: Cigarettes     Last attempt to quit: 2015     Years since quittin.9   • Smokeless tobacco: Never Used   Substance and Sexual Activity   • Alcohol use: Yes     Comment: social   • Sexual activity: Defer     Body mass index is 36.55 kg/m².      Medications:  Current Outpatient Medications   Medication Sig  Dispense Refill   • albuterol (PROVENTIL HFA;VENTOLIN HFA) 108 (90 Base) MCG/ACT inhaler Inhale 2 puffs Every 6 (Six) Hours As Needed for Wheezing or Shortness of Air.     • aspirin 81 MG EC tablet Take 243 mg by mouth Daily. Takes 3 tabs per dose     • atorvastatin (LIPITOR) 20 MG tablet Take 20 mg by mouth Daily.     • Blood Glucose Monitoring Suppl (TRUE METRIX METER) w/Device kit Use as directed to test Blood Sugar. 1 kit 0   • clopidogrel (PLAVIX) 75 MG tablet Take 75 mg by mouth Daily.     • glucose blood test strip Use as directed to test Blood Sugar 2 times a day. 50 each 0   • HYDROcodone-acetaminophen (NORCO) 7.5-325 MG per tablet Take 1 tablet by mouth Every 6 (Six) Hours As Needed for Moderate Pain . 12 tablet 0   • metFORMIN ER (GLUCOPHAGE-XR) 500 MG 24 hr tablet Take 500 mg by mouth Daily.     • metoprolol succinate XL (TOPROL-XL) 50 MG 24 hr tablet Take 50 mg by mouth Daily.     • naproxen (NAPROSYN) 500 MG tablet Take 500 mg by mouth Daily.     • TRUEPLUS LANCETS 28G misc Use as Directed to test Blood Sugar 2 times a day 50 each 0     No current facility-administered medications for this visit.          Allergies:  Allergies   Allergen Reactions   • Penicillins Hives         Review of Systems:   Review of Systems   Constitutional: Negative.    HENT: Negative.    Eyes: Negative.    Respiratory: Negative.    Cardiovascular: Negative.    Gastrointestinal: Negative.    Endocrine: Negative.    Genitourinary: Negative.    Musculoskeletal: Positive for arthralgias and joint swelling.   Skin: Negative.    Allergic/Immunologic: Negative.    Neurological: Negative.    Hematological: Negative.    Psychiatric/Behavioral: Negative.          Physical Exam:   Physical Exam   Constitutional: He is oriented to person, place, and time. No distress.   HENT:   Head: Normocephalic and atraumatic.   Right Ear: External ear normal.   Left Ear: External ear normal.   Eyes: Conjunctivae and EOM are normal. Right eye exhibits  "no discharge. Left eye exhibits no discharge.   Neck: Normal range of motion. Neck supple.   Cardiovascular: Normal rate, regular rhythm and normal heart sounds.   No murmur heard.  Pulmonary/Chest: Effort normal and breath sounds normal. No respiratory distress. He has no wheezes.   Abdominal: Soft. He exhibits no distension. There is no guarding.   Neurological: He is alert and oriented to person, place, and time.   Skin: Skin is warm and dry. Capillary refill takes less than 2 seconds. He is not diaphoretic.   Psychiatric: He has a normal mood and affect. His behavior is normal. Judgment and thought content normal.   Nursing note and vitals reviewed.    GENERAL: 48 y.o. male, alert and oriented X 3 in no acute distress.   Visit Vitals  /89   Pulse 90   Ht 172.7 cm (67.99\")   Wt 109 kg (240 lb 4.8 oz)   BMI 36.55 kg/m²       Musculoskeletal:   Right ankle evaluation reveals generalized swelling with ecchymoses medial lateral aspects he has active motion of his toes with normal sensation and good capillary refill    Radiology/Labs:   Currently displaced distal fibular fracture approximately 2 cm proximal to the joint line.  Ankle mortise is preserved on lateral view there is moderate displacement.      Assessment & Plan:   48 y.o. male with mildly displaced right distal fibula fracture.  He is now 10 days post injury.  He is unable to wear cast.  We discussed his options today I think he is at risk for further displacement without stabilization.  After discussion regarding surgical versus nonoperative treatment he wishes to consider an reduction internal fixation.  He is referred to his cardiologist for clearance as he has had cardiac stent placed about 5 years ago at the [facility] and is currently on aspirin and Plavix.  This will give us opportunity to hold aspirin and Plavix preoperatively with tentative plans for open reduction internal fixation right distal fibula next week.  He is to be seen by " cardiology today.      ICD-10-CM ICD-9-CM   1. Displaced fracture of lateral malleolus of right fibula, initial encounter for closed fracture S82.61XA 824.2           Cc:   Doris Melo APRN              This document has been electronically signed by Christian Brooks MD   December 12, 2019 12:27 PM

## 2019-12-11 NOTE — H&P (VIEW-ONLY)
History and Physical      Patient: Austin Sneed  YOB: 1970  Date of Encounter: 2019      Chief Complaint:   Chief Complaint   Patient presents with   • Right Ankle - Pain, Edema, Fracture, Follow-up         HPI:   Austin Sneed, 48 y.o. male, seen today for evaluation of right ankle fracture injury date of 2019 when he slipped and fell.  He was initially placed in anterior splint and this was then changed to cast however he removed his cast.  He presents today with walking boot. He is an over the road .    Active Problem List:  Patient Active Problem List   Diagnosis   • Precordial pain   • Dyspnea on exertion   • ASCVD (arteriosclerotic cardiovascular disease), status post stenting about 3 years ago in [facility].    • Chest pain   • Erectile dysfunction   • Closed nondisplaced fracture of lateral malleolus of right fibula         Past Medical History:  Past Medical History:   Diagnosis Date   • Diabetes mellitus (CMS/[facility])    • Hyperlipidemia    • Hypertension    • Sleep apnea          Past Surgical History:  Past Surgical History:   Procedure Laterality Date   • CORONARY STENT PLACEMENT  2015    x1 stent [place] facility         Family History:  Family History   Problem Relation Age of Onset   • Heart disease Father    • Heart attack Maternal Grandmother          Social History:  Social History     Socioeconomic History   • Marital status:      Spouse name: Not on file   • Number of children: Not on file   • Years of education: Not on file   • Highest education level: Not on file   Tobacco Use   • Smoking status: Former Smoker     Packs/day: 0.50     Types: Cigarettes     Last attempt to quit: 2015     Years since quittin.9   • Smokeless tobacco: Never Used   Substance and Sexual Activity   • Alcohol use: Yes     Comment: social   • Sexual activity: Defer     Body mass index is 36.55 kg/m².      Medications:  Current Outpatient Medications   Medication Sig  Dispense Refill   • albuterol (PROVENTIL HFA;VENTOLIN HFA) 108 (90 Base) MCG/ACT inhaler Inhale 2 puffs Every 6 (Six) Hours As Needed for Wheezing or Shortness of Air.     • aspirin 81 MG EC tablet Take 243 mg by mouth Daily. Takes 3 tabs per dose     • atorvastatin (LIPITOR) 20 MG tablet Take 20 mg by mouth Daily.     • Blood Glucose Monitoring Suppl (TRUE METRIX METER) w/Device kit Use as directed to test Blood Sugar. 1 kit 0   • clopidogrel (PLAVIX) 75 MG tablet Take 75 mg by mouth Daily.     • glucose blood test strip Use as directed to test Blood Sugar 2 times a day. 50 each 0   • HYDROcodone-acetaminophen (NORCO) 7.5-325 MG per tablet Take 1 tablet by mouth Every 6 (Six) Hours As Needed for Moderate Pain . 12 tablet 0   • metFORMIN ER (GLUCOPHAGE-XR) 500 MG 24 hr tablet Take 500 mg by mouth Daily.     • metoprolol succinate XL (TOPROL-XL) 50 MG 24 hr tablet Take 50 mg by mouth Daily.     • naproxen (NAPROSYN) 500 MG tablet Take 500 mg by mouth Daily.     • TRUEPLUS LANCETS 28G misc Use as Directed to test Blood Sugar 2 times a day 50 each 0     No current facility-administered medications for this visit.          Allergies:  Allergies   Allergen Reactions   • Penicillins Hives         Review of Systems:   Review of Systems   Constitutional: Negative.    HENT: Negative.    Eyes: Negative.    Respiratory: Negative.    Cardiovascular: Negative.    Gastrointestinal: Negative.    Endocrine: Negative.    Genitourinary: Negative.    Musculoskeletal: Positive for arthralgias and joint swelling.   Skin: Negative.    Allergic/Immunologic: Negative.    Neurological: Negative.    Hematological: Negative.    Psychiatric/Behavioral: Negative.          Physical Exam:   Physical Exam   Constitutional: He is oriented to person, place, and time. No distress.   HENT:   Head: Normocephalic and atraumatic.   Right Ear: External ear normal.   Left Ear: External ear normal.   Eyes: Conjunctivae and EOM are normal. Right eye exhibits  "no discharge. Left eye exhibits no discharge.   Neck: Normal range of motion. Neck supple.   Cardiovascular: Normal rate, regular rhythm and normal heart sounds.   No murmur heard.  Pulmonary/Chest: Effort normal and breath sounds normal. No respiratory distress. He has no wheezes.   Abdominal: Soft. He exhibits no distension. There is no guarding.   Neurological: He is alert and oriented to person, place, and time.   Skin: Skin is warm and dry. Capillary refill takes less than 2 seconds. He is not diaphoretic.   Psychiatric: He has a normal mood and affect. His behavior is normal. Judgment and thought content normal.   Nursing note and vitals reviewed.    GENERAL: 48 y.o. male, alert and oriented X 3 in no acute distress.   Visit Vitals  /89   Pulse 90   Ht 172.7 cm (67.99\")   Wt 109 kg (240 lb 4.8 oz)   BMI 36.55 kg/m²       Musculoskeletal:   Right ankle evaluation reveals generalized swelling with ecchymoses medial lateral aspects he has active motion of his toes with normal sensation and good capillary refill    Radiology/Labs:   Currently displaced distal fibular fracture approximately 2 cm proximal to the joint line.  Ankle mortise is preserved on lateral view there is moderate displacement.      Assessment & Plan:   48 y.o. male with mildly displaced right distal fibula fracture.  He is now 10 days post injury.  He is unable to wear cast.  We discussed his options today I think he is at risk for further displacement without stabilization.  After discussion regarding surgical versus nonoperative treatment he wishes to consider an reduction internal fixation.  He is referred to his cardiologist for clearance as he has had cardiac stent placed about 5 years ago at the [facility] and is currently on aspirin and Plavix.  This will give us opportunity to hold aspirin and Plavix preoperatively with tentative plans for open reduction internal fixation right distal fibula next week.  He is to be seen by " cardiology today.      ICD-10-CM ICD-9-CM   1. Displaced fracture of lateral malleolus of right fibula, initial encounter for closed fracture S82.61XA 824.2           Cc:   Doris Melo APRN              This document has been electronically signed by Christian Brooks MD   December 12, 2019 12:27 PM

## 2019-12-12 PROBLEM — E66.01 MORBIDLY OBESE: Status: ACTIVE | Noted: 2019-12-12

## 2019-12-12 PROBLEM — E11.9 TYPE 2 DIABETES MELLITUS WITHOUT COMPLICATION, WITHOUT LONG-TERM CURRENT USE OF INSULIN: Status: ACTIVE | Noted: 2019-12-12

## 2019-12-13 ENCOUNTER — TELEPHONE (OUTPATIENT)
Dept: ORTHOPEDIC SURGERY | Facility: CLINIC | Age: 49
End: 2019-12-13

## 2019-12-13 PROBLEM — S82.61XA DISPLACED FRACTURE OF LATERAL MALLEOLUS OF RIGHT FIBULA, INITIAL ENCOUNTER FOR CLOSED FRACTURE: Status: ACTIVE | Noted: 2019-12-13

## 2019-12-16 ENCOUNTER — APPOINTMENT (OUTPATIENT)
Dept: PREADMISSION TESTING | Facility: HOSPITAL | Age: 49
End: 2019-12-16

## 2019-12-16 ENCOUNTER — TELEPHONE (OUTPATIENT)
Dept: ORTHOPEDIC SURGERY | Facility: CLINIC | Age: 49
End: 2019-12-16

## 2019-12-16 DIAGNOSIS — S82.61XA DISPLACED FRACTURE OF LATERAL MALLEOLUS OF RIGHT FIBULA, INITIAL ENCOUNTER FOR CLOSED FRACTURE: ICD-10-CM

## 2019-12-16 LAB
ANION GAP SERPL CALCULATED.3IONS-SCNC: 10.2 MMOL/L (ref 5–15)
BUN BLD-MCNC: 20 MG/DL (ref 6–20)
BUN/CREAT SERPL: 22.5 (ref 7–25)
CALCIUM SPEC-SCNC: 9.5 MG/DL (ref 8.6–10.5)
CHLORIDE SERPL-SCNC: 104 MMOL/L (ref 98–107)
CO2 SERPL-SCNC: 24.8 MMOL/L (ref 22–29)
CREAT BLD-MCNC: 0.89 MG/DL (ref 0.76–1.27)
DEPRECATED RDW RBC AUTO: 42.4 FL (ref 37–54)
ERYTHROCYTE [DISTWIDTH] IN BLOOD BY AUTOMATED COUNT: 13.1 % (ref 12.3–15.4)
GFR SERPL CREATININE-BSD FRML MDRD: 90 ML/MIN/1.73
GLUCOSE BLD-MCNC: 114 MG/DL (ref 65–99)
HCT VFR BLD AUTO: 47 % (ref 37.5–51)
HGB BLD-MCNC: 14.8 G/DL (ref 13–17.7)
MCH RBC QN AUTO: 27.8 PG (ref 26.6–33)
MCHC RBC AUTO-ENTMCNC: 31.5 G/DL (ref 31.5–35.7)
MCV RBC AUTO: 88.3 FL (ref 79–97)
PLATELET # BLD AUTO: 272 10*3/MM3 (ref 140–450)
PMV BLD AUTO: 9 FL (ref 6–12)
POTASSIUM BLD-SCNC: 4.2 MMOL/L (ref 3.5–5.2)
RBC # BLD AUTO: 5.32 10*6/MM3 (ref 4.14–5.8)
SODIUM BLD-SCNC: 139 MMOL/L (ref 136–145)
WBC NRBC COR # BLD: 8.95 10*3/MM3 (ref 3.4–10.8)

## 2019-12-16 PROCEDURE — 85027 COMPLETE CBC AUTOMATED: CPT | Performed by: ANESTHESIOLOGY

## 2019-12-16 PROCEDURE — 36415 COLL VENOUS BLD VENIPUNCTURE: CPT

## 2019-12-16 PROCEDURE — 80048 BASIC METABOLIC PNL TOTAL CA: CPT | Performed by: ANESTHESIOLOGY

## 2019-12-16 NOTE — TELEPHONE ENCOUNTER
Patient is aware of SX arrival time 12-17-19@11:00AM. He is also aware of POC appt. 12-30-19@10:30am

## 2019-12-16 NOTE — DISCHARGE INSTRUCTIONS
0900---12/17/19   ARRIVAL TIME    TAKE the following medications the morning of surgery:  All heart or blood pressure medications    HOLD all diabetic medications the morning of surgery as ordered by physician.    Please discontinue all blood thinners and anticoagulants (except aspirin) prior to surgery as per your surgeon and cardiologist instructions.  Aspirin may be continued up to the day prior to surgery.     CHLORHEXIDINE CLOTHS GIVEN WITH INSTRUCTIONS AND FORM TO RETURN TO HOSPITAL    General Instructions:  · Do not eat or drink after midnight: includes water, mints, or gum. You may brush your teeth.  Dental appliances that are removable must be taken out day of surgery.  · Do not smoke, chew tobacco, or drink alcohol.  · Bring medications in original bottles, any inhalers and if applicable your C-PAP/BI-PAP machine.  · Bring any papers given to you in the doctor's office.  · Wear clean comfortable clothes and socks.  · Do not wear contact lenses or make-up. Bring a case for your glasses if applicable.  · Bring crutches or walker if applicable.  · Leave all other valuables and jewelry at home.    If you were given a blood bank ID arm band remember to bring it with you the day of surgery.    Preventing a Surgical Site Infection:  Shower the night before surgery (unless instructed other wise) using a fresh bar of anti-bacterial soap (such as Dial) and clean washcloth. Dry with a clean towel and dress in clean clothing.  For 2 to 3 days before surgery, avoid shaving with a razor near where you will have surgery because the razor can irritate skin and make it easier to develop an infection. Ask your surgeon if you will be receiving antibiotics prior to surgery.  Make sure you, your family, and all healthcare providers clear their hands with soap and water or an alcohol-based hand  before caring for you or your wound.  If at all possible, quit smoking as many days before surgery as you can.    Day of  surgery:  Upon arrival, a Pre-op nurse and Anesthesiologist will review your health history, obtain vital signs, and answer questions you may have. The only belongings needed at this time will be your home medications and if applicable your C-PAP/BI-PAP machine. If you are staying overnight your family can leave the rest of your belongings in the car and bring them to your room later. A Pre-op nurse will start an IV and you may receive medication in preparation for surgery, including something to help you relax. Your family will be able to see you in the Pre-op area. While you are in surgery your family should notify the waiting room  if they leave the waiting room area and provide a contact phone number.    Please be aware that surgery does come with discomfort. We want to make every effort to control your discomfort so please discuss any uncontrolled symptoms with your nurse. Your doctor will most likely have prescribed pain medications.    If you are going home after surgery you will receive individualized written care instructions before being discharged. A responsible adult must drive you to and from the hospital on the day of surgery and stay with you for 24 hours.    If you are staying overnight following surgery, you will be transported to your hospital room following the recovery period.   has all private rooms.    If you have any questions please call Pre-Admission Testing at [phone].  Deductibles and co-payments are collected on the day of service. Please be prepared to pay the required co-pay, deductible or deposit on the day of service as defined by your plan.

## 2019-12-17 ENCOUNTER — ANESTHESIA (OUTPATIENT)
Dept: PERIOP | Facility: HOSPITAL | Age: 49
End: 2019-12-17

## 2019-12-17 ENCOUNTER — HOSPITAL ENCOUNTER (OUTPATIENT)
Facility: HOSPITAL | Age: 49
Setting detail: HOSPITAL OUTPATIENT SURGERY
Discharge: HOME | End: 2019-12-17
Attending: ORTHOPAEDIC SURGERY | Admitting: ORTHOPAEDIC SURGERY

## 2019-12-17 ENCOUNTER — APPOINTMENT (OUTPATIENT)
Dept: GENERAL RADIOLOGY | Facility: HOSPITAL | Age: 49
End: 2019-12-17

## 2019-12-17 ENCOUNTER — ANESTHESIA EVENT (OUTPATIENT)
Dept: PERIOP | Facility: HOSPITAL | Age: 49
End: 2019-12-17

## 2019-12-17 ENCOUNTER — NURSE TRIAGE (OUTPATIENT)
Dept: CALL CENTER | Facility: HOSPITAL | Age: 49
End: 2019-12-17

## 2019-12-17 VITALS
RESPIRATION RATE: 18 BRPM | DIASTOLIC BLOOD PRESSURE: 76 MMHG | BODY MASS INDEX: 36.37 KG/M2 | SYSTOLIC BLOOD PRESSURE: 121 MMHG | TEMPERATURE: 97.8 F | OXYGEN SATURATION: 96 % | HEART RATE: 79 BPM | HEIGHT: 68 IN | WEIGHT: 240 LBS

## 2019-12-17 DIAGNOSIS — S82.61XA DISPLACED FRACTURE OF LATERAL MALLEOLUS OF RIGHT FIBULA, INITIAL ENCOUNTER FOR CLOSED FRACTURE: ICD-10-CM

## 2019-12-17 DIAGNOSIS — S82.61XA DISPLACED FRACTURE OF LATERAL MALLEOLUS OF RIGHT FIBULA, INITIAL ENCOUNTER FOR CLOSED FRACTURE: Primary | ICD-10-CM

## 2019-12-17 LAB — GLUCOSE BLDC GLUCOMTR-MCNC: 101 MG/DL (ref 70–130)

## 2019-12-17 PROCEDURE — 25010000002 PROPOFOL 10 MG/ML EMULSION: Performed by: NURSE ANESTHETIST, CERTIFIED REGISTERED

## 2019-12-17 PROCEDURE — C1713 ANCHOR/SCREW BN/BN,TIS/BN: HCPCS | Performed by: ORTHOPAEDIC SURGERY

## 2019-12-17 PROCEDURE — 76000 FLUOROSCOPY <1 HR PHYS/QHP: CPT

## 2019-12-17 PROCEDURE — 25010000002 FENTANYL CITRATE (PF) 100 MCG/2ML SOLUTION: Performed by: ANESTHESIOLOGY

## 2019-12-17 PROCEDURE — 25010000002 DEXAMETHASONE PER 1 MG: Performed by: ANESTHESIOLOGY

## 2019-12-17 PROCEDURE — 82962 GLUCOSE BLOOD TEST: CPT

## 2019-12-17 PROCEDURE — 25010000002 ONDANSETRON PER 1 MG: Performed by: NURSE ANESTHETIST, CERTIFIED REGISTERED

## 2019-12-17 PROCEDURE — 76000 FLUOROSCOPY <1 HR PHYS/QHP: CPT | Performed by: RADIOLOGY

## 2019-12-17 PROCEDURE — 25010000002 BUPRENORPHINE PER 0.1 MG: Performed by: ANESTHESIOLOGY

## 2019-12-17 PROCEDURE — 27792 TREATMENT OF ANKLE FRACTURE: CPT | Performed by: ORTHOPAEDIC SURGERY

## 2019-12-17 PROCEDURE — 25010000002 MIDAZOLAM PER 1 MG: Performed by: ANESTHESIOLOGY

## 2019-12-17 PROCEDURE — 25010000002 HYDROMORPHONE PER 4 MG: Performed by: NURSE ANESTHETIST, CERTIFIED REGISTERED

## 2019-12-17 PROCEDURE — 25010000002 VANCOMYCIN 5 G RECONSTITUTED SOLUTION 5,000 MG VIAL: Performed by: ORTHOPAEDIC SURGERY

## 2019-12-17 PROCEDURE — 25010000002 ROPIVACAINE PER 1 MG: Performed by: ANESTHESIOLOGY

## 2019-12-17 PROCEDURE — 94799 UNLISTED PULMONARY SVC/PX: CPT

## 2019-12-17 DEVICE — SCRW CORT S/TAP 3.5X12MM: Type: IMPLANTABLE DEVICE | Site: ANKLE | Status: FUNCTIONAL

## 2019-12-17 DEVICE — SCRW CORT S/TAP 3.5X16MM: Type: IMPLANTABLE DEVICE | Site: ANKLE | Status: FUNCTIONAL

## 2019-12-17 DEVICE — PLT TBG 1/3 LCP W COL 7HL 81MM: Type: IMPLANTABLE DEVICE | Site: ANKLE | Status: FUNCTIONAL

## 2019-12-17 DEVICE — SCRW CORT S/TAP 3.5X20MM: Type: IMPLANTABLE DEVICE | Site: ANKLE | Status: FUNCTIONAL

## 2019-12-17 DEVICE — SCRW CORT S/TAP 3.5X14MM: Type: IMPLANTABLE DEVICE | Site: ANKLE | Status: FUNCTIONAL

## 2019-12-17 RX ORDER — LIDOCAINE HYDROCHLORIDE AND EPINEPHRINE BITARTRATE 20; .01 MG/ML; MG/ML
INJECTION, SOLUTION SUBCUTANEOUS
Status: COMPLETED | OUTPATIENT
Start: 2019-12-17 | End: 2019-12-17

## 2019-12-17 RX ORDER — ROPIVACAINE HYDROCHLORIDE 5 MG/ML
INJECTION, SOLUTION EPIDURAL; INFILTRATION; PERINEURAL
Status: COMPLETED | OUTPATIENT
Start: 2019-12-17 | End: 2019-12-17

## 2019-12-17 RX ORDER — ONDANSETRON 2 MG/ML
4 INJECTION INTRAMUSCULAR; INTRAVENOUS AS NEEDED
Status: DISCONTINUED | OUTPATIENT
Start: 2019-12-17 | End: 2019-12-17 | Stop reason: HOSPADM

## 2019-12-17 RX ORDER — FAMOTIDINE 10 MG/ML
INJECTION, SOLUTION INTRAVENOUS AS NEEDED
Status: DISCONTINUED | OUTPATIENT
Start: 2019-12-17 | End: 2019-12-17 | Stop reason: SURG

## 2019-12-17 RX ORDER — SODIUM CHLORIDE 0.9 % (FLUSH) 0.9 %
10 SYRINGE (ML) INJECTION EVERY 12 HOURS SCHEDULED
Status: DISCONTINUED | OUTPATIENT
Start: 2019-12-17 | End: 2019-12-17 | Stop reason: HOSPADM

## 2019-12-17 RX ORDER — ONDANSETRON 2 MG/ML
INJECTION INTRAMUSCULAR; INTRAVENOUS AS NEEDED
Status: DISCONTINUED | OUTPATIENT
Start: 2019-12-17 | End: 2019-12-17 | Stop reason: SURG

## 2019-12-17 RX ORDER — METOPROLOL TARTRATE 5 MG/5ML
INJECTION INTRAVENOUS AS NEEDED
Status: DISCONTINUED | OUTPATIENT
Start: 2019-12-17 | End: 2019-12-17 | Stop reason: SURG

## 2019-12-17 RX ORDER — FENTANYL CITRATE 50 UG/ML
50 INJECTION, SOLUTION INTRAMUSCULAR; INTRAVENOUS
Status: DISCONTINUED | OUTPATIENT
Start: 2019-12-17 | End: 2019-12-17 | Stop reason: HOSPADM

## 2019-12-17 RX ORDER — BUPRENORPHINE HYDROCHLORIDE 0.32 MG/ML
INJECTION INTRAMUSCULAR; INTRAVENOUS
Status: COMPLETED | OUTPATIENT
Start: 2019-12-17 | End: 2019-12-17

## 2019-12-17 RX ORDER — MIDAZOLAM HYDROCHLORIDE 1 MG/ML
INJECTION INTRAMUSCULAR; INTRAVENOUS AS NEEDED
Status: DISCONTINUED | OUTPATIENT
Start: 2019-12-17 | End: 2019-12-17 | Stop reason: SURG

## 2019-12-17 RX ORDER — MEPERIDINE HYDROCHLORIDE 25 MG/ML
12.5 INJECTION INTRAMUSCULAR; INTRAVENOUS; SUBCUTANEOUS
Status: DISCONTINUED | OUTPATIENT
Start: 2019-12-17 | End: 2019-12-17 | Stop reason: HOSPADM

## 2019-12-17 RX ORDER — OXYCODONE HYDROCHLORIDE AND ACETAMINOPHEN 5; 325 MG/1; MG/1
1-2 TABLET ORAL EVERY 4 HOURS PRN
Qty: 30 TABLET | Refills: 0 | Status: SHIPPED | OUTPATIENT
Start: 2019-12-17 | End: 2020-09-04

## 2019-12-17 RX ORDER — SODIUM CHLORIDE, SODIUM LACTATE, POTASSIUM CHLORIDE, CALCIUM CHLORIDE 600; 310; 30; 20 MG/100ML; MG/100ML; MG/100ML; MG/100ML
125 INJECTION, SOLUTION INTRAVENOUS CONTINUOUS
Status: DISCONTINUED | OUTPATIENT
Start: 2019-12-17 | End: 2019-12-17 | Stop reason: HOSPADM

## 2019-12-17 RX ORDER — HYDROMORPHONE HCL 110MG/55ML
PATIENT CONTROLLED ANALGESIA SYRINGE INTRAVENOUS AS NEEDED
Status: DISCONTINUED | OUTPATIENT
Start: 2019-12-17 | End: 2019-12-17 | Stop reason: SURG

## 2019-12-17 RX ORDER — SODIUM CHLORIDE 0.9 % (FLUSH) 0.9 %
10 SYRINGE (ML) INJECTION AS NEEDED
Status: DISCONTINUED | OUTPATIENT
Start: 2019-12-17 | End: 2019-12-17 | Stop reason: HOSPADM

## 2019-12-17 RX ORDER — OXYCODONE HYDROCHLORIDE AND ACETAMINOPHEN 5; 325 MG/1; MG/1
1 TABLET ORAL ONCE AS NEEDED
Status: DISCONTINUED | OUTPATIENT
Start: 2019-12-17 | End: 2019-12-17 | Stop reason: HOSPADM

## 2019-12-17 RX ORDER — BUPIVACAINE HYDROCHLORIDE 5 MG/ML
INJECTION, SOLUTION EPIDURAL; INTRACAUDAL AS NEEDED
Status: DISCONTINUED | OUTPATIENT
Start: 2019-12-17 | End: 2019-12-17 | Stop reason: HOSPADM

## 2019-12-17 RX ORDER — IPRATROPIUM BROMIDE AND ALBUTEROL SULFATE 2.5; .5 MG/3ML; MG/3ML
3 SOLUTION RESPIRATORY (INHALATION) ONCE AS NEEDED
Status: DISCONTINUED | OUTPATIENT
Start: 2019-12-17 | End: 2019-12-17 | Stop reason: HOSPADM

## 2019-12-17 RX ORDER — DEXAMETHASONE SODIUM PHOSPHATE 4 MG/ML
INJECTION, SOLUTION INTRA-ARTICULAR; INTRALESIONAL; INTRAMUSCULAR; INTRAVENOUS; SOFT TISSUE
Status: COMPLETED | OUTPATIENT
Start: 2019-12-17 | End: 2019-12-17

## 2019-12-17 RX ORDER — PROPOFOL 10 MG/ML
VIAL (ML) INTRAVENOUS AS NEEDED
Status: DISCONTINUED | OUTPATIENT
Start: 2019-12-17 | End: 2019-12-17 | Stop reason: SURG

## 2019-12-17 RX ORDER — FENTANYL CITRATE 50 UG/ML
INJECTION, SOLUTION INTRAMUSCULAR; INTRAVENOUS AS NEEDED
Status: DISCONTINUED | OUTPATIENT
Start: 2019-12-17 | End: 2019-12-17 | Stop reason: SURG

## 2019-12-17 RX ADMIN — FENTANYL CITRATE 100 MCG: 50 INJECTION INTRAMUSCULAR; INTRAVENOUS at 14:00

## 2019-12-17 RX ADMIN — HYDROMORPHONE HYDROCHLORIDE 0.5 MG: 2 INJECTION, SOLUTION INTRAMUSCULAR; INTRAVENOUS; SUBCUTANEOUS at 15:21

## 2019-12-17 RX ADMIN — LIDOCAINE HYDROCHLORIDE,EPINEPHRINE BITARTRATE 20 ML: 20; .01 INJECTION, SOLUTION INFILTRATION; PERINEURAL at 14:04

## 2019-12-17 RX ADMIN — ROPIVACAINE HYDROCHLORIDE 100 MG: 5 INJECTION, SOLUTION EPIDURAL; INFILTRATION; PERINEURAL at 14:04

## 2019-12-17 RX ADMIN — HYDROMORPHONE HYDROCHLORIDE 0.5 MG: 2 INJECTION, SOLUTION INTRAMUSCULAR; INTRAVENOUS; SUBCUTANEOUS at 15:52

## 2019-12-17 RX ADMIN — METOPROLOL TARTRATE 2 MG: 5 INJECTION, SOLUTION INTRAVENOUS at 15:45

## 2019-12-17 RX ADMIN — FAMOTIDINE 20 MG: 10 INJECTION INTRAVENOUS at 14:32

## 2019-12-17 RX ADMIN — DEXAMETHASONE SODIUM PHOSPHATE 4 MG: 4 INJECTION, SOLUTION INTRAMUSCULAR; INTRAVENOUS at 14:04

## 2019-12-17 RX ADMIN — SODIUM CHLORIDE, POTASSIUM CHLORIDE, SODIUM LACTATE AND CALCIUM CHLORIDE: 600; 310; 30; 20 INJECTION, SOLUTION INTRAVENOUS at 14:27

## 2019-12-17 RX ADMIN — PROPOFOL 150 MG: 10 INJECTION, EMULSION INTRAVENOUS at 14:32

## 2019-12-17 RX ADMIN — HYDROMORPHONE HYDROCHLORIDE 0.5 MG: 2 INJECTION, SOLUTION INTRAMUSCULAR; INTRAVENOUS; SUBCUTANEOUS at 15:01

## 2019-12-17 RX ADMIN — VANCOMYCIN HYDROCHLORIDE 1.75 G: 5 INJECTION, POWDER, LYOPHILIZED, FOR SOLUTION INTRAVENOUS at 14:29

## 2019-12-17 RX ADMIN — BUPRENORPHINE HYDROCHLORIDE 0.3 MG: 0.32 INJECTION INTRAMUSCULAR; INTRAVENOUS at 14:04

## 2019-12-17 RX ADMIN — ONDANSETRON 4 MG: 2 INJECTION INTRAMUSCULAR; INTRAVENOUS at 14:32

## 2019-12-17 RX ADMIN — HYDROMORPHONE HYDROCHLORIDE 0.5 MG: 2 INJECTION, SOLUTION INTRAMUSCULAR; INTRAVENOUS; SUBCUTANEOUS at 16:01

## 2019-12-17 RX ADMIN — SODIUM CHLORIDE, POTASSIUM CHLORIDE, SODIUM LACTATE AND CALCIUM CHLORIDE 125 ML/HR: 600; 310; 30; 20 INJECTION, SOLUTION INTRAVENOUS at 13:32

## 2019-12-17 RX ADMIN — MIDAZOLAM HYDROCHLORIDE 2 MG: 1 INJECTION, SOLUTION INTRAMUSCULAR; INTRAVENOUS at 14:00

## 2019-12-17 NOTE — SIGNIFICANT NOTE
UNABLE TO MOVE TOES, UNABLE TO FEEL TOUCH OF TOES,  CAP. REFILL BRISK, COLOR WNL, WARM TO TOUCH. (BLOCK DONE IN PREOP)

## 2019-12-17 NOTE — ANESTHESIA PROCEDURE NOTES
Airway  Urgency: elective    Date/Time: 12/17/2019 2:33 PM  Airway not difficult    General Information and Staff    Patient location during procedure: OR  Anesthesiologist: Niko Amin MD  CRNA: Afsaneh Reyes CRNA    Indications and Patient Condition  Indications for airway management: airway protection    Preoxygenated: yes  Mask difficulty assessment: 0 - not attempted    Final Airway Details  Final airway type: supraglottic airway      Successful airway: unique  Size 4    Number of attempts at approach: 1    Additional Comments  LMA placed with no trauma noted. Patient tolerated well. Good seal. Secured.

## 2019-12-17 NOTE — ANESTHESIA PREPROCEDURE EVALUATION
Anesthesia Evaluation     Patient summary reviewed and Nursing notes reviewed   no history of anesthetic complications:  NPO Solid Status: > 8 hours  NPO Liquid Status: > 8 hours           Airway   Mallampati: II  TM distance: >3 FB  Neck ROM: full  no difficulty expected  Dental - normal exam   (+) poor dentition    Pulmonary - normal exam   (+) a smoker Former, shortness of breath, sleep apnea,   (-) asthma  Cardiovascular - normal exam  Exercise tolerance: good (4-7 METS)    NYHA Classification: II    (+) hypertension, CAD, cardiac stents (x1 in 2014) more than 12 months ago hyperlipidemia,   (-) past MI, dysrhythmias, angina, CHF      Neuro/Psych- negative ROS  (-) seizures, CVA  GI/Hepatic/Renal/Endo    (+) obesity,   diabetes mellitus,   (-) morbid obesity, GERD, liver disease, no renal disease, no thyroid disorder    Musculoskeletal     Abdominal  - normal exam    Bowel sounds: normal.   Substance History - negative use     OB/GYN negative ob/gyn ROS         Other   arthritis,                    Anesthesia Plan    ASA 3     general with block   (Right popliteal and adductor canal block)  intravenous induction     Anesthetic plan, all risks, benefits, and alternatives have been provided, discussed and informed consent has been obtained with: patient and spouse/significant other.  Use of blood products discussed with patient and spouse/significant other  Consented to blood products.

## 2019-12-17 NOTE — SIGNIFICANT NOTE
UNABLE TO MOVE TOES, DOES NOT FEEL TOUCH, CAP. REFILL BRISK, COLOR WNL, WARM TO TOUCH. (BLOCK WAS DONE IN PREOP)

## 2019-12-17 NOTE — ANESTHESIA PROCEDURE NOTES
Peripheral Block-- simplified adductor canal and polliteal block RIGHT    Pre-sedation assessment completed: 12/17/2019 2:00 PM    Patient reassessed immediately prior to procedure    Patient location during procedure: post-op  Start time: 12/17/2019 2:03 PM  Stop time: 12/17/2019 2:14 PM  Reason for block: at surgeon's request and post-op pain management  Performed by  Anesthesiologist: Niko Amin MD  Preanesthetic Checklist  Completed: patient identified, site marked, surgical consent, pre-op evaluation, timeout performed, IV checked, risks and benefits discussed and monitors and equipment checked  Prep:  Pt Position: supine  Sterile barriers:cap, gloves, mask and sterile barriers  Prep: ChloraPrep  Patient monitoring: blood pressure monitoring, continuous pulse oximetry and EKG  Procedure  Sedation:no  Performed under: MAC  Guidance:ultrasound guided  ULTRASOUND INTERPRETATION. Using ultrasound guidance a 20 G gauge needle was placed in close proximity to the nerve, at which point, under ultrasound guidance anesthetic was injected in the area of the nerve and spread of the anesthesia was seen on ultrasound in close proximity thereto.  There were no abnormalities seen on ultrasound; a digital image was taken; and the patient tolerated the procedure with no complications. Images:still images obtained, printed/placed on chart    Laterality:right  Block Type:adductor canal block and popliteal  Injection Technique:single-shot  Needle Type:echogenic and short-bevel  Needle Gauge:20 G  Resistance on Injection: none  Catheter Size:16 G (20g)    Medications Used: lidocaine-EPINEPHrine (XYLOCAINE W/EPI) 2 %-1:796662 injection, 20 mL  ropivacaine (NAROPIN) injection 0.5 %, 100 mg  dexamethasone (DECADRON) injection, 4 mg  buprenorphine (BUPRENEX) injection, 0.3 mg  Med admintered at 12/17/2019 2:04 PM      Post Assessment  Injection Assessment: negative aspiration for heme, incremental injection and no paresthesia on  injection  Patient Tolerance:comfortable throughout block  Complications:no  Additional Notes  Procedure: LA split dose between popliteal and simplified adductor canal block.                    The pt was placed in the Supine position.  The Insertion site was  prepped and Draped in sterile fashion. Simplified adducttor canal block performed.  A  20g, 90 mm IMD echogenic needle was then  inserted approximately midline, mid-thigh and advanced In-plane with Ultrasound guidance.  Normal Saline PSF was utilized for hydrodissection of tissue.  The Vastus medialis and Sartorius muscle where visualized and the needle tip was placed in the adductor canal,  lateral to the femoral artery.  LA injection spread was visualized, injection was incremental 1-5ml, injection pressure was normal or little, no intraneural injection, no vascular injection.  LA dose was injected thru the needle (see dose above).       Pt turned left lateral.  Prepped, draped, sterile technique.  A 20 G 100 mm echogenic needle inserted 3 cm proximal to popliteal artery at the lateral mid biceps femoris with U/S guidance.  Popliteal artery visualized as well as the common peroneal and tibial bifurcation  LA spread visualized, incremental 1-5 cc injection, normal pressures, no intraneural injection.  Pt michelle procedures well. No Cx.   Thank you.

## 2019-12-17 NOTE — ANESTHESIA PROCEDURE NOTES
Peripheral Block      Patient location during procedure: pre-op  Reason for block: at surgeon's request and post-op pain management  Preanesthetic Checklist  Completed: patient identified, site marked, surgical consent, pre-op evaluation, timeout performed, IV checked, risks and benefits discussed and monitors and equipment checked  Prep:  Sterile barriers:cap, gloves, mask and sterile barriers  Prep: ChloraPrep  Patient monitoring: blood pressure monitoring, continuous pulse oximetry and EKG  Procedure    Guidance:ultrasound guided  Images:still images obtained    Block Type:popliteal  Injection Technique:catheter  Needle Type:echogenic  Needle Gauge:18 G    Catheter Size:20 G          Post Assessment  Injection Assessment: negative aspiration for heme, no paresthesia on injection and incremental injection  Patient Tolerance:comfortable throughout block  Complications:no  Additional Notes  Procedure:                    CATHETER at skin: {CKA numbers 0-20:59837}                                           Analgesia was achieved with {AnesBlock:95686}      The pt was placed in  lateral position.  The Insertion site was  prepped and Draped in sterile fashion.  The pt was anesthetized with  IV Sedation( see meds).  Skin and cutaneous tissue was infiltrated and anesthetized with 1% Lidocaine via a 25g needle.  A BBraun 4 inch 18g echogenic needle was then  inserted approximately 3 cm proximal to the popliteal james a at the lateral mid biceps femoris and advanced In-plane with Ultrasound guidance.  Normal Saline PSF was utilized for hydrodissection of tissue.  The popliteal artery was visualized and the common peroneal and tibial bifurcation was located.  LA injection spread was visualized, injection was incremental 1-5ml, injection pressure was normal or little, no intraneural injection, no vascular injection.  .  A BBraun 20g wire stylet catheter was placed via the needle with ultrasound visualization and confirmation  with NS fluid bolus. The labeled Catheter was then secured at insertions site with skin afix,  mastisol, steristrips  and a CHG transparent dressing was placed over. Thank you

## 2019-12-17 NOTE — ANESTHESIA POSTPROCEDURE EVALUATION
Patient: Austin Sneed    Procedure Summary     Date:  12/17/19 Room / Location:  [facility] OR  /  [facility] OR    Anesthesia Start:  1427 Anesthesia Stop:  1611    Procedure:  OPEN REDUCTION INTERNAL FIXATION RIGHT DISTAL FIBULA (Right Leg Lower) Diagnosis:       Displaced fracture of lateral malleolus of right fibula, initial encounter for closed fracture      (Displaced fracture of lateral malleolus of right fibula, initial encounter for closed fracture [S82.61XA])    Surgeon:  Christian Brooks MD Provider:  Niko Amin MD    Anesthesia Type:  general with block ASA Status:  3          Anesthesia Type: general with block    Vitals  Vitals Value Taken Time   /72 12/17/2019  4:44 PM   Temp 97.6 °F (36.4 °C) 12/17/2019  4:14 PM   Pulse 77 12/17/2019  4:44 PM   Resp 15 12/17/2019  4:44 PM   SpO2 95 % 12/17/2019  4:44 PM           Post Anesthesia Care and Evaluation    Patient location during evaluation: PHASE II  Patient participation: complete - patient participated  Level of consciousness: awake and alert  Pain score: 1  Pain management: adequate  Airway patency: patent  Anesthetic complications: No anesthetic complications  PONV Status: controlled  Cardiovascular status: acceptable  Respiratory status: acceptable  Hydration status: acceptable

## 2019-12-17 NOTE — OP NOTE
FIBULA OPEN REDUCTION INTERNAL FIXATION  Procedure Note    Austin Sneed  12/17/2019    Pre-op Diagnosis:   Displaced fracture of lateral malleolus of right fibula, initial encounter for closed fracture [S82.61XA]    Post-op Diagnosis:     Post-Op Diagnosis Codes:     * Displaced fracture of lateral malleolus of right fibula, initial encounter for closed fracture [S82.61XA]    Procedure(s):  OPEN REDUCTION INTERNAL FIXATION RIGHT DISTAL FIBULA    Surgeon(s):  Christian Brooks MD    Anesthesia: General/regional/local    Operative technique: With patient in the operating theatre under general anesthesia with the right leg placed in tourniquet right lower extremity sterilely prepped and draped in usual manner.  Extremity was exsanguinated tourniquet inflated to 200 mmHg.  Longitudinal incision made from the tip of the fibula extending proximally.  Through skin sharply.  Lightly dissecting the oblique fracture of the fibular shaft was exposed.  It was anatomically reduced and secured with single screw from posterior to anterior in lag fashion.  7 hole one third tubular plate was then position gently rotated for contour and then secured with multiple screws.  Duction was anatomic based on images from C arm.  Gait was deflated hemostasis obtained the wound closed in layers with staples for final closure sterile dressing applied with the posterior splint he was taken to recovery room in stable condition.    Staff:   Circulator: lCifton Hoover RN  Radiology Technologist: Andrea Rich  Scrub Person: Marilyn Grigsby  Assistant: Jossue Diaz CSA    Estimated Blood Loss: 25 mL    Specimens:   none             * No orders in the log *    Implants/Grafts: 7 hole one third tubular plate      Drains: None      Complications: none    Tourniquet time: 46   min    Christian Brooks MD     Date: 12/17/2019  Time: 4:17 PM    Cc: Doris Melo APRN

## 2019-12-18 ENCOUNTER — TELEPHONE (OUTPATIENT)
Dept: ORTHOPEDIC SURGERY | Facility: CLINIC | Age: 49
End: 2019-12-18

## 2019-12-18 NOTE — TELEPHONE ENCOUNTER
Caller advised it should be alright for  to take ZZZquil to help him sleep tonight. Caller agrees to follow care advice.   1916 Caller concerned because her  is sweating and anxious. She thinks it may be claustrophobia from cast on right leg. Pulse is 60, no means to check his blood pressure. Advised caller to check with surgeon on call about these symptoms. Also instructed how to reach surgeon on call. And that if no call back within 1 hour to go to ED. Caller agrees to follow care advice. 1927 Called caller back and asked if she can check his sugar, caller states they do not know how. Advised caller to take him to ED for evaluation.   Reason for Disposition  • Caller has medication question only, adult not sick, and triager answers question    Additional Information  • Negative: Drug overdose and nurse unable to answer question  • Negative: Caller requesting information not related to medicine  • Negative: Caller requesting a prescription for Strep throat and has a positive culture result  • Negative: Rash while taking a medication or within 3 days of stopping it  • Negative: Immunization reaction suspected  • Negative: [1] Asthma and [2] having symptoms of asthma (cough, wheezing, etc)  • Negative: MORE THAN A DOUBLE DOSE of a prescription or over-the-counter (OTC) drug  • Negative: [1] DOUBLE DOSE (an extra dose or lesser amount) of over-the-counter (OTC) drug AND [2] any symptoms (e.g., dizziness, nausea, pain, sleepiness)  • Negative: [1] DOUBLE DOSE (an extra dose or lesser amount) of prescription drug AND [2] any symptoms (e.g., dizziness, nausea, pain, sleepiness)  • Negative: Took another person's prescription drug  • Negative: [1] DOUBLE DOSE (an extra dose or lesser amount) of prescription drug AND [2] NO symptoms (Exception: a double dose of antibiotics)  • Negative: Diabetes drug error or overdose (e.g., insulin or extra dose)  • Negative: [1] Request for URGENT new prescription or refill  "of \"essential\" medication (i.e., likelihood of harm to patient if not taken) AND [2] triager unable to fill per unit policy  • Negative: [1] Prescription not at pharmacy AND [2] was prescribed today by PCP  • Negative: Pharmacy calling with prescription questions and triager unable to answer question  • Negative: Caller has URGENT medication question about med that PCP prescribed and triager unable to answer question  • Negative: Caller has NON-URGENT medication question about med that PCP prescribed and triager unable to answer question  • Negative: Caller requesting a NON-URGENT new prescription or refill and triager unable to refill per unit policy  • Negative: Caller has medication question about med not prescribed by PCP and triager unable to answer question (e.g., compatibility with other med, storage)  • Negative: [1] DOUBLE DOSE (an extra dose or lesser amount) of over-the-counter (OTC) drug AND [2] NO symptoms  • Negative: [1] DOUBLE DOSE (an extra dose or lesser amount) of antibiotic drug AND [2] NO symptoms    Answer Assessment - Initial Assessment Questions  1. SYMPTOMS: \"Do you have any symptoms?\"      Pt is anxious unable to sleep  2. SEVERITY: If symptoms are present, ask \"Are they mild, moderate or severe?\"      mild    Protocols used: MEDICATION QUESTION CALL-ADULT-      "

## 2019-12-18 NOTE — TELEPHONE ENCOUNTER
Notified patient to resume taking his medication, Plavix.   Also, paperwork has been completed and is ready for

## 2019-12-21 ENCOUNTER — NURSE TRIAGE (OUTPATIENT)
Dept: CALL CENTER | Facility: HOSPITAL | Age: 49
End: 2019-12-21

## 2019-12-21 NOTE — TELEPHONE ENCOUNTER
"  AVS reviewed with caller.  No instructions about dressing found in AVS or Epic notes.  Referred to surgeon.  Reason for Disposition  • Information only question and nurse able to answer    Additional Information  • Negative: Nursing judgment  • Negative: Nursing judgment  • Negative: Nursing judgment  • Negative: Nursing judgment    Answer Assessment - Initial Assessment Questions  1. REASON FOR CALL or QUESTION: \"What is your reason for calling today?\" or \"How can I best help you?\" or \"What question do you have that I can help answer?\"      When can I change my dressing?    Protocols used: INFORMATION ONLY CALL - NO TRIAGE-ADULT-[place]      "

## 2019-12-30 ENCOUNTER — HOSPITAL ENCOUNTER (OUTPATIENT)
Dept: GENERAL RADIOLOGY | Facility: HOSPITAL | Age: 49
Discharge: HOME | End: 2019-12-30
Admitting: ORTHOPAEDIC SURGERY

## 2019-12-30 ENCOUNTER — OFFICE VISIT (OUTPATIENT)
Dept: ORTHOPEDIC SURGERY | Facility: CLINIC | Age: 49
End: 2019-12-30

## 2019-12-30 VITALS — WEIGHT: 240 LBS | HEIGHT: 68 IN | BODY MASS INDEX: 36.37 KG/M2

## 2019-12-30 DIAGNOSIS — Z09 POSTOP CHECK: Primary | ICD-10-CM

## 2019-12-30 DIAGNOSIS — S82.61XA DISPLACED FRACTURE OF LATERAL MALLEOLUS OF RIGHT FIBULA, INITIAL ENCOUNTER FOR CLOSED FRACTURE: ICD-10-CM

## 2019-12-30 DIAGNOSIS — S82.64XD CLOSED NONDISPLACED FRACTURE OF LATERAL MALLEOLUS OF RIGHT FIBULA WITH ROUTINE HEALING, SUBSEQUENT ENCOUNTER: ICD-10-CM

## 2019-12-30 PROCEDURE — 73610 X-RAY EXAM OF ANKLE: CPT | Performed by: RADIOLOGY

## 2019-12-30 PROCEDURE — 73610 X-RAY EXAM OF ANKLE: CPT

## 2019-12-30 PROCEDURE — 99024 POSTOP FOLLOW-UP VISIT: CPT | Performed by: ORTHOPAEDIC SURGERY

## 2020-01-27 ENCOUNTER — HOSPITAL ENCOUNTER (OUTPATIENT)
Dept: GENERAL RADIOLOGY | Facility: HOSPITAL | Age: 50
Discharge: HOME | End: 2020-01-27
Admitting: ORTHOPAEDIC SURGERY

## 2020-01-27 ENCOUNTER — OFFICE VISIT (OUTPATIENT)
Dept: ORTHOPEDIC SURGERY | Facility: CLINIC | Age: 50
End: 2020-01-27

## 2020-01-27 VITALS — HEIGHT: 68 IN | WEIGHT: 240.3 LBS | BODY MASS INDEX: 36.42 KG/M2

## 2020-01-27 DIAGNOSIS — Z98.890 STATUS POST OPEN REDUCTION WITH INTERNAL FIXATION (ORIF) OF FRACTURE OF ANKLE: ICD-10-CM

## 2020-01-27 DIAGNOSIS — Z09 POSTOP CHECK: Primary | ICD-10-CM

## 2020-01-27 DIAGNOSIS — Z87.81 STATUS POST OPEN REDUCTION WITH INTERNAL FIXATION (ORIF) OF FRACTURE OF ANKLE: ICD-10-CM

## 2020-01-27 PROCEDURE — 73610 X-RAY EXAM OF ANKLE: CPT

## 2020-01-27 PROCEDURE — 99024 POSTOP FOLLOW-UP VISIT: CPT | Performed by: ORTHOPAEDIC SURGERY

## 2020-01-27 PROCEDURE — 73610 X-RAY EXAM OF ANKLE: CPT | Performed by: RADIOLOGY

## 2020-01-27 NOTE — PROGRESS NOTES
Follow-up Visit         Patient: Austin Sneed  YOB: 1970  Date of Encounter: 2020      Chief  Complaint:   Chief Complaint   Patient presents with   • Right Ankle - Post-op, Follow-up         HPI:  Austin Sneed, 49 y.o. male presents in follow-up of open reduction internal fixation right distal fibula 2019.  5 and half weeks postop doing well relating full weightbearing with no pain.    Medical History:  Patient Active Problem List   Diagnosis   • Precordial pain   • Dyspnea on exertion   • ASCVD (arteriosclerotic cardiovascular disease), status post stenting about 3 years ago in [facility].    • Chest pain   • Erectile dysfunction   • Closed nondisplaced fracture of lateral malleolus of right fibula   • Morbidly obese (CMS/[facility])   • Type 2 diabetes mellitus without complication, without long-term current use of insulin (CMS/[facility])   • Displaced fracture of lateral malleolus of right fibula, initial encounter for closed fracture     Past Medical History:   Diagnosis Date   • Arthritis    • Coronary artery disease    • Diabetes mellitus (CMS/[facility])    • Elevated cholesterol    • Fracture    • Hyperlipidemia    • Hypertension    • Sleep apnea        Social History:  Social History     Socioeconomic History   • Marital status:      Spouse name: Not on file   • Number of children: Not on file   • Years of education: Not on file   • Highest education level: Not on file   Tobacco Use   • Smoking status: Former Smoker     Packs/day: 0.50     Years: 10.00     Pack years: 5.00     Types: Cigarettes     Last attempt to quit: 2015     Years since quittin.0   • Smokeless tobacco: Never Used   Substance and Sexual Activity   • Alcohol use: Yes     Comment: social   • Drug use: Never   • Sexual activity: Defer       Surgical History:  Past Surgical History:   Procedure Laterality Date   • CARDIAC CATHETERIZATION     • COLONOSCOPY     • CORONARY STENT PLACEMENT  2015    x1 stent [place]  facility   • FRACTURE SURGERY     • ORIF FIBULA FRACTURE Right 12/17/2019    Procedure: OPEN REDUCTION INTERNAL FIXATION RIGHT DISTAL FIBULA;  Surgeon: Christian Brooks MD;  Location: [facility];  Service: Orthopedics       Radiology:   Xr Ankle 3+ View Right    Result Date: 1/27/2020  Negative postoperative right ankle.  This report was finalized on 1/27/2020 3:59 PM by Dr. Antonio Dove II, MD.      Xr Ankle 3+ View Right    Result Date: 12/30/2019  A plate device with multiple screws is now seen fixing the fibular fracture.  This report was finalized on 12/30/2019 10:50 AM by Dr. Antonio Dove II, MD.          Examination:   Examination ankle reveals full mobility intact incision range of motion full no instability neurovascular grossly intact.    Assessment & Plan:   49 y.o. male presents nearly 6 weeks follow-up with right distal fibula ORIF doing well.  He feels he is able to return to work we have therefore provided him a note to return Wednesday January 29, 2020.        No diagnosis found.      Procedures        Cc:  Doris Melo APRN              This document has been electronically signed by Christian Brooks MD   January 27, 2020 4:02 PM

## 2020-06-28 ENCOUNTER — TRANSCRIBE ORDERS (OUTPATIENT)
Dept: ADMINISTRATIVE | Facility: HOSPITAL | Age: 50
End: 2020-06-28

## 2020-06-28 ENCOUNTER — HOSPITAL ENCOUNTER (OUTPATIENT)
Dept: GENERAL RADIOLOGY | Facility: HOSPITAL | Age: 50
Discharge: HOME | End: 2020-06-28
Admitting: NURSE PRACTITIONER

## 2020-06-28 DIAGNOSIS — M54.50 LUMBAR PAIN: Primary | ICD-10-CM

## 2020-06-28 PROCEDURE — 72110 X-RAY EXAM L-2 SPINE 4/>VWS: CPT

## 2020-06-28 PROCEDURE — 72110 X-RAY EXAM L-2 SPINE 4/>VWS: CPT | Performed by: RADIOLOGY

## 2020-07-27 ENCOUNTER — HOSPITAL ENCOUNTER (OUTPATIENT)
Dept: ULTRASOUND IMAGING | Facility: HOSPITAL | Age: 50
Discharge: HOME | End: 2020-07-27
Admitting: NURSE PRACTITIONER

## 2020-07-27 ENCOUNTER — TRANSCRIBE ORDERS (OUTPATIENT)
Dept: OTHER | Facility: OTHER | Age: 50
End: 2020-07-27

## 2020-07-27 DIAGNOSIS — K46.9 INTERSTITIAL HERNIA: ICD-10-CM

## 2020-07-27 DIAGNOSIS — K46.9 INTERSTITIAL HERNIA: Primary | ICD-10-CM

## 2020-07-27 PROCEDURE — 76700 US EXAM ABDOM COMPLETE: CPT

## 2020-07-27 PROCEDURE — 76700 US EXAM ABDOM COMPLETE: CPT | Performed by: RADIOLOGY

## 2020-07-31 ENCOUNTER — TRANSCRIBE ORDERS (OUTPATIENT)
Dept: ADMINISTRATIVE | Facility: HOSPITAL | Age: 50
End: 2020-07-31

## 2020-07-31 DIAGNOSIS — R10.9 ABDOMINAL PAIN, UNSPECIFIED ABDOMINAL LOCATION: Primary | ICD-10-CM

## 2020-08-10 ENCOUNTER — APPOINTMENT (OUTPATIENT)
Dept: CT IMAGING | Facility: HOSPITAL | Age: 50
End: 2020-08-10

## 2020-08-10 ENCOUNTER — HOSPITAL ENCOUNTER (EMERGENCY)
Facility: HOSPITAL | Age: 50
Discharge: HOME | End: 2020-08-11
Attending: EMERGENCY MEDICINE | Admitting: EMERGENCY MEDICINE

## 2020-08-10 DIAGNOSIS — N20.0 KIDNEY STONE: Primary | ICD-10-CM

## 2020-08-10 LAB
ALBUMIN SERPL-MCNC: 4.62 G/DL (ref 3.5–5.2)
ALBUMIN/GLOB SERPL: 1.8 G/DL
ALP SERPL-CCNC: 46 U/L (ref 39–117)
ALT SERPL W P-5'-P-CCNC: 26 U/L (ref 1–41)
ANION GAP SERPL CALCULATED.3IONS-SCNC: 12.5 MMOL/L (ref 5–15)
AST SERPL-CCNC: 15 U/L (ref 1–40)
BACTERIA UR QL AUTO: ABNORMAL /HPF
BASOPHILS # BLD AUTO: 0.07 10*3/MM3 (ref 0–0.2)
BASOPHILS NFR BLD AUTO: 0.7 % (ref 0–1.5)
BILIRUB SERPL-MCNC: 0.4 MG/DL (ref 0–1.2)
BILIRUB UR QL STRIP: NEGATIVE
BUN SERPL-MCNC: 21 MG/DL (ref 6–20)
BUN/CREAT SERPL: 21.9 (ref 7–25)
CALCIUM SPEC-SCNC: 9.8 MG/DL (ref 8.6–10.5)
CHLORIDE SERPL-SCNC: 105 MMOL/L (ref 98–107)
CK SERPL-CCNC: 108 U/L (ref 20–200)
CLARITY UR: ABNORMAL
CO2 SERPL-SCNC: 25.5 MMOL/L (ref 22–29)
COLOR UR: ABNORMAL
CREAT SERPL-MCNC: 0.96 MG/DL (ref 0.76–1.27)
CRP SERPL-MCNC: 0.06 MG/DL (ref 0–0.5)
DEPRECATED RDW RBC AUTO: 42.5 FL (ref 37–54)
EOSINOPHIL # BLD AUTO: 0.14 10*3/MM3 (ref 0–0.4)
EOSINOPHIL NFR BLD AUTO: 1.5 % (ref 0.3–6.2)
ERYTHROCYTE [DISTWIDTH] IN BLOOD BY AUTOMATED COUNT: 12.9 % (ref 12.3–15.4)
ERYTHROCYTE [SEDIMENTATION RATE] IN BLOOD: 6 MM/HR (ref 0–20)
GFR SERPL CREATININE-BSD FRML MDRD: 83 ML/MIN/1.73
GLOBULIN UR ELPH-MCNC: 2.6 GM/DL
GLUCOSE SERPL-MCNC: 101 MG/DL (ref 65–99)
GLUCOSE UR STRIP-MCNC: NEGATIVE MG/DL
HCT VFR BLD AUTO: 46.5 % (ref 37.5–51)
HGB BLD-MCNC: 15.1 G/DL (ref 13–17.7)
HGB UR QL STRIP.AUTO: ABNORMAL
HYALINE CASTS UR QL AUTO: ABNORMAL /LPF
IMM GRANULOCYTES # BLD AUTO: 0.07 10*3/MM3 (ref 0–0.05)
IMM GRANULOCYTES NFR BLD AUTO: 0.7 % (ref 0–0.5)
KETONES UR QL STRIP: NEGATIVE
LEUKOCYTE ESTERASE UR QL STRIP.AUTO: ABNORMAL
LIPASE SERPL-CCNC: 190 U/L (ref 13–60)
LYMPHOCYTES # BLD AUTO: 2.49 10*3/MM3 (ref 0.7–3.1)
LYMPHOCYTES NFR BLD AUTO: 26.2 % (ref 19.6–45.3)
MAGNESIUM SERPL-MCNC: 2 MG/DL (ref 1.6–2.6)
MCH RBC QN AUTO: 28.9 PG (ref 26.6–33)
MCHC RBC AUTO-ENTMCNC: 32.5 G/DL (ref 31.5–35.7)
MCV RBC AUTO: 89.1 FL (ref 79–97)
MONOCYTES # BLD AUTO: 0.77 10*3/MM3 (ref 0.1–0.9)
MONOCYTES NFR BLD AUTO: 8.1 % (ref 5–12)
NEUTROPHILS NFR BLD AUTO: 5.95 10*3/MM3 (ref 1.7–7)
NEUTROPHILS NFR BLD AUTO: 62.8 % (ref 42.7–76)
NITRITE UR QL STRIP: NEGATIVE
NRBC BLD AUTO-RTO: 0 /100 WBC (ref 0–0.2)
PH UR STRIP.AUTO: <=5 [PH] (ref 5–8)
PHOSPHATE SERPL-MCNC: 3.1 MG/DL (ref 2.5–4.5)
PLATELET # BLD AUTO: 249 10*3/MM3 (ref 140–450)
PMV BLD AUTO: 9.2 FL (ref 6–12)
POTASSIUM SERPL-SCNC: 4.8 MMOL/L (ref 3.5–5.2)
PROT SERPL-MCNC: 7.2 G/DL (ref 6–8.5)
PROT UR QL STRIP: ABNORMAL
RBC # BLD AUTO: 5.22 10*6/MM3 (ref 4.14–5.8)
RBC # UR: ABNORMAL /HPF
REF LAB TEST METHOD: ABNORMAL
SODIUM SERPL-SCNC: 143 MMOL/L (ref 136–145)
SP GR UR STRIP: 1.03 (ref 1–1.03)
SQUAMOUS #/AREA URNS HPF: ABNORMAL /HPF
T4 FREE SERPL-MCNC: 1.33 NG/DL (ref 0.93–1.7)
TROPONIN T SERPL-MCNC: <0.01 NG/ML (ref 0–0.03)
TSH SERPL DL<=0.05 MIU/L-ACNC: 4.17 UIU/ML (ref 0.27–4.2)
UROBILINOGEN UR QL STRIP: ABNORMAL
WBC # BLD AUTO: 9.49 10*3/MM3 (ref 3.4–10.8)
WBC UR QL AUTO: ABNORMAL /HPF

## 2020-08-10 PROCEDURE — 84443 ASSAY THYROID STIM HORMONE: CPT | Performed by: EMERGENCY MEDICINE

## 2020-08-10 PROCEDURE — 25010000002 MORPHINE PER 10 MG: Performed by: EMERGENCY MEDICINE

## 2020-08-10 PROCEDURE — 85025 COMPLETE CBC W/AUTO DIFF WBC: CPT | Performed by: EMERGENCY MEDICINE

## 2020-08-10 PROCEDURE — 96374 THER/PROPH/DIAG INJ IV PUSH: CPT

## 2020-08-10 PROCEDURE — 84439 ASSAY OF FREE THYROXINE: CPT | Performed by: EMERGENCY MEDICINE

## 2020-08-10 PROCEDURE — 86140 C-REACTIVE PROTEIN: CPT | Performed by: EMERGENCY MEDICINE

## 2020-08-10 PROCEDURE — 96375 TX/PRO/DX INJ NEW DRUG ADDON: CPT

## 2020-08-10 PROCEDURE — 74176 CT ABD & PELVIS W/O CONTRAST: CPT

## 2020-08-10 PROCEDURE — 99284 EMERGENCY DEPT VISIT MOD MDM: CPT

## 2020-08-10 PROCEDURE — 83690 ASSAY OF LIPASE: CPT | Performed by: EMERGENCY MEDICINE

## 2020-08-10 PROCEDURE — 25010000002 ONDANSETRON PER 1 MG: Performed by: EMERGENCY MEDICINE

## 2020-08-10 PROCEDURE — 93010 ELECTROCARDIOGRAM REPORT: CPT | Performed by: INTERNAL MEDICINE

## 2020-08-10 PROCEDURE — 36415 COLL VENOUS BLD VENIPUNCTURE: CPT

## 2020-08-10 PROCEDURE — 80053 COMPREHEN METABOLIC PANEL: CPT | Performed by: EMERGENCY MEDICINE

## 2020-08-10 PROCEDURE — 93005 ELECTROCARDIOGRAM TRACING: CPT | Performed by: EMERGENCY MEDICINE

## 2020-08-10 PROCEDURE — 81001 URINALYSIS AUTO W/SCOPE: CPT | Performed by: EMERGENCY MEDICINE

## 2020-08-10 PROCEDURE — 85652 RBC SED RATE AUTOMATED: CPT | Performed by: EMERGENCY MEDICINE

## 2020-08-10 PROCEDURE — 83735 ASSAY OF MAGNESIUM: CPT | Performed by: EMERGENCY MEDICINE

## 2020-08-10 PROCEDURE — 84484 ASSAY OF TROPONIN QUANT: CPT | Performed by: EMERGENCY MEDICINE

## 2020-08-10 PROCEDURE — 82550 ASSAY OF CK (CPK): CPT | Performed by: EMERGENCY MEDICINE

## 2020-08-10 PROCEDURE — 84100 ASSAY OF PHOSPHORUS: CPT | Performed by: EMERGENCY MEDICINE

## 2020-08-10 RX ORDER — SODIUM CHLORIDE 0.9 % (FLUSH) 0.9 %
10 SYRINGE (ML) INJECTION AS NEEDED
Status: DISCONTINUED | OUTPATIENT
Start: 2020-08-10 | End: 2020-08-11 | Stop reason: HOSPADM

## 2020-08-10 RX ORDER — ONDANSETRON 2 MG/ML
4 INJECTION INTRAMUSCULAR; INTRAVENOUS ONCE
Status: COMPLETED | OUTPATIENT
Start: 2020-08-10 | End: 2020-08-10

## 2020-08-10 RX ADMIN — ONDANSETRON 4 MG: 2 INJECTION INTRAMUSCULAR; INTRAVENOUS at 21:31

## 2020-08-10 RX ADMIN — MORPHINE SULFATE 4 MG: 4 INJECTION, SOLUTION INTRAMUSCULAR; INTRAVENOUS at 21:31

## 2020-08-10 RX ADMIN — SODIUM CHLORIDE 1000 ML: 9 INJECTION, SOLUTION INTRAVENOUS at 21:30

## 2020-08-11 ENCOUNTER — TELEPHONE (OUTPATIENT)
Dept: UROLOGY | Facility: CLINIC | Age: 50
End: 2020-08-11

## 2020-08-11 ENCOUNTER — OFFICE VISIT (OUTPATIENT)
Dept: UROLOGY | Facility: CLINIC | Age: 50
End: 2020-08-11

## 2020-08-11 ENCOUNTER — APPOINTMENT (OUTPATIENT)
Dept: CT IMAGING | Facility: HOSPITAL | Age: 50
End: 2020-08-11

## 2020-08-11 VITALS
TEMPERATURE: 98.8 F | WEIGHT: 230 LBS | SYSTOLIC BLOOD PRESSURE: 124 MMHG | DIASTOLIC BLOOD PRESSURE: 97 MMHG | RESPIRATION RATE: 16 BRPM | BODY MASS INDEX: 36.1 KG/M2 | HEART RATE: 79 BPM | HEIGHT: 67 IN | OXYGEN SATURATION: 98 %

## 2020-08-11 VITALS — HEIGHT: 67 IN | TEMPERATURE: 97.8 F | BODY MASS INDEX: 37.83 KG/M2 | WEIGHT: 241 LBS

## 2020-08-11 DIAGNOSIS — N20.0 RENAL CALCULUS, RIGHT: Primary | ICD-10-CM

## 2020-08-11 PROCEDURE — 99204 OFFICE O/P NEW MOD 45 MIN: CPT | Performed by: UROLOGY

## 2020-08-11 RX ORDER — KETOROLAC TROMETHAMINE 10 MG/1
10 TABLET, FILM COATED ORAL EVERY 8 HOURS PRN
Qty: 15 TABLET | Refills: 0 | Status: SHIPPED | OUTPATIENT
Start: 2020-08-11 | End: 2020-09-04

## 2020-08-11 RX ORDER — HYDROCODONE BITARTRATE AND ACETAMINOPHEN 5; 325 MG/1; MG/1
1 TABLET ORAL EVERY 6 HOURS PRN
Qty: 10 TABLET | Refills: 0 | Status: SHIPPED | OUTPATIENT
Start: 2020-08-11 | End: 2020-09-09

## 2020-08-11 RX ORDER — ONDANSETRON 4 MG/1
4 TABLET, FILM COATED ORAL EVERY 8 HOURS PRN
Qty: 30 TABLET | Refills: 0 | Status: SHIPPED | OUTPATIENT
Start: 2020-08-11 | End: 2020-09-09

## 2020-08-11 NOTE — ED PROVIDER NOTES
Subjective   49-year-old male in the emergency department with lower abdominal pain that is been present for the last several days.  Rates his pain as 5/10 at this time in severity.  Denies nausea, vomiting, diarrhea, fever, chills, chest pain, or shortness of breath.  Has extensive past medical history so please refer to the chart.      History provided by:  Patient   used: No        Review of Systems   Constitutional: Negative for activity change, appetite change, chills, diaphoresis, fatigue and fever.   HENT: Negative for congestion, ear pain and sore throat.    Eyes: Negative for redness.   Respiratory: Negative for cough, chest tightness, shortness of breath and wheezing.    Cardiovascular: Negative for chest pain, palpitations and leg swelling.   Gastrointestinal: Positive for abdominal pain. Negative for diarrhea, nausea and vomiting.   Genitourinary: Positive for flank pain. Negative for dysuria and urgency.   Musculoskeletal: Negative for arthralgias, back pain, myalgias and neck pain.   Skin: Negative for pallor, rash and wound.   Neurological: Negative for dizziness, speech difficulty, weakness and headaches.   Psychiatric/Behavioral: Negative for agitation, behavioral problems, confusion and decreased concentration.   All other systems reviewed and are negative.      Past Medical History:   Diagnosis Date   • Arthritis    • Coronary artery disease    • Diabetes mellitus (CMS/HCC)    • Elevated cholesterol    • Fracture    • Hyperlipidemia    • Hypertension    • Sleep apnea        Allergies   Allergen Reactions   • Penicillins Hives       Past Surgical History:   Procedure Laterality Date   • CARDIAC CATHETERIZATION     • COLONOSCOPY     • CORONARY STENT PLACEMENT  2015    x1 stent [place] facility   • FRACTURE SURGERY     • ORIF FIBULA FRACTURE Right 12/17/2019    Procedure: OPEN REDUCTION INTERNAL FIXATION RIGHT DISTAL FIBULA;  Surgeon: Christian Brooks MD;  Location: [facility] OR;   Service: Orthopedics       Family History   Problem Relation Age of Onset   • Heart disease Father    • Heart attack Maternal Grandmother        Social History     Socioeconomic History   • Marital status:      Spouse name: Not on file   • Number of children: Not on file   • Years of education: Not on file   • Highest education level: Not on file   Tobacco Use   • Smoking status: Former Smoker     Packs/day: 0.50     Years: 10.00     Pack years: 5.00     Types: Cigarettes     Last attempt to quit:      Years since quittin.6   • Smokeless tobacco: Never Used   Substance and Sexual Activity   • Alcohol use: Yes     Comment: social   • Drug use: Never   • Sexual activity: Defer           Objective   Physical Exam   Constitutional: He is oriented to person, place, and time. He appears well-developed and well-nourished.  Non-toxic appearance. No distress.   HENT:   Head: Normocephalic and atraumatic.   Right Ear: External ear normal.   Left Ear: External ear normal.   Nose: Nose normal.   Mouth/Throat: Oropharynx is clear and moist and mucous membranes are normal. No oropharyngeal exudate. No tonsillar exudate.   Eyes: Pupils are equal, round, and reactive to light. Conjunctivae, EOM and lids are normal.   Neck: Normal range of motion and full passive range of motion without pain. Neck supple. No thyromegaly present.   Cardiovascular: Normal rate, regular rhythm, S1 normal, S2 normal, normal heart sounds, intact distal pulses and normal pulses.   Pulmonary/Chest: Effort normal and breath sounds normal. No tachypnea. No respiratory distress. He has no decreased breath sounds. He has no wheezes. He has no rales. He exhibits no tenderness.   Abdominal: Soft. Normal appearance and bowel sounds are normal. He exhibits no distension. There is tenderness. There is no rebound and no guarding.   Musculoskeletal: Normal range of motion. He exhibits no edema, tenderness or deformity.   Lymphadenopathy:     He has no  cervical adenopathy.   Neurological: He is alert and oriented to person, place, and time. He has normal strength. No cranial nerve deficit or sensory deficit. GCS eye subscore is 4. GCS verbal subscore is 5. GCS motor subscore is 6.   Skin: Skin is warm, dry and intact. No rash noted. He is not diaphoretic. No erythema. No pallor.   Psychiatric: He has a normal mood and affect. His speech is normal and behavior is normal. Judgment and thought content normal. Cognition and memory are normal.   Nursing note and vitals reviewed.      Procedures           ED Course  ED Course as of Aug 11 0720   Tue Aug 11, 2020   0717 IMPRESSION:     1. Mild right hydronephrosis due to a 1.2 cm stone at the UPJ. There are also small nonobstructing stones in the right kidney.  2. Colonic diverticulosis without acute diverticulitis. Normal appendix.  3. Ileus pattern in the small bowel. Follow-up is recommended.  4. Stranding in the mesentery, probably a normal finding for this patient. There is no associated adenopathy.  5. Fat containing umbilical hernia.   CT Abdomen Pelvis Without Contrast [ES]   0718 Normal sinus rhythm  Normal ECG  When compared with ECG of 26-JUL-2018 06:51,  No significant change was found  Vent. rate 90 BPM  NM interval 148 ms  QRS duration 88 ms  QT/QTc 354/433 ms  P-R-T axes 21 43 31   ECG 12 Lead [ES]   0719 Discussed case with on-call urologist Dr. Retana who will evaluate the patient this morning at 9 AM in his office.  Patient is to return the emergency department with any worsening symptoms.    [ES]      ED Course User Index  [ES] Steven Ochoa MD                                           MDM  Number of Diagnoses or Management Options  Kidney stone: new and requires workup     Amount and/or Complexity of Data Reviewed  Clinical lab tests: reviewed and ordered  Tests in the radiology section of CPT®: reviewed and ordered  Tests in the medicine section of CPT®: reviewed and ordered  Review and  summarize past medical records: yes  Discuss the patient with other providers: yes  Independent visualization of images, tracings, or specimens: yes    Risk of Complications, Morbidity, and/or Mortality  Presenting problems: moderate  Diagnostic procedures: moderate  Management options: moderate    Patient Progress  Patient progress: stable      Final diagnoses:   Kidney stone            Steven Ochoa MD  08/11/20 9533

## 2020-08-11 NOTE — TELEPHONE ENCOUNTER
Pt was seen in office and needs a cardiac clearance for his ESWL procedure on 8/21. I called Dr. Dempsey's office and he has an appt scheduled for Monday 8/17@11 am and I called the pt and let him know he expressed understanding.

## 2020-08-11 NOTE — H&P (VIEW-ONLY)
Chief Complaint:          Chief Complaint   Patient presents with   • Nephrolithiasis       HPI:   49 y.o. male who is a  presented to the emergency room with a right 12 mm UPJ stone.  This is his first stone.  He is never had surgery.  He had a recent ankle surgery and was immobilized and had a stent 5 years ago.  We dealing with a immobilization stone he has no family history.  He has intermittent colicky flank pain.  I am getting recommended lithotripsy but because of his stent he will need a cardiac clearance with Dr. Castillo as he missed his last appointment.  Renal calculus-we discussed the presence of the stone we discussed the various therapeutic options available including percutaneous nephrostolithotomy, lithotripsy.  We discussed the risks of lithotripsy including the passage of stones the development of a large string of stones in the distal ureter known as Steinstrasse.  In the 3% incidence of that we will need to proceed with a ureteroscopy for obstructing fragments.  Extremely rare incidence of renal hematoma.  And the significance of this.  We discussed the absolute relative indicators for intervention including the presence of sepsis, and pain we cannot control is the primary need for urgent intervention.  We discussed placement of a stent if indicated and the management of the stent as well.  ESWL-the patient is a candidate for extracorporeal shockwave lithotripsy.  We discussed the type of stone.  And the complications associated with the procedure including but not limited to pain in the flank, hematoma, spontaneous renal hemorrhage, and adequate fragmentation of stones.  The need for passage of the stones.  The need for concomitant additional procedures in the range of 24% the risk of a distal fragment in the range of 3% requiring ureteroscopic removal.        Past Medical History:        Past Medical History:   Diagnosis Date   • Arthritis    • Coronary artery disease    • Diabetes  mellitus (CMS/HCC)    • Elevated cholesterol    • Fracture    • Hyperlipidemia    • Hypertension    • Sleep apnea          Current Meds:     Current Outpatient Medications   Medication Sig Dispense Refill   • albuterol (PROVENTIL HFA;VENTOLIN HFA) 108 (90 Base) MCG/ACT inhaler Inhale 2 puffs Every 6 (Six) Hours As Needed for Wheezing or Shortness of Air.     • aspirin 81 MG EC tablet Take 243 mg by mouth Daily. Takes 3 tabs per dose     • atorvastatin (LIPITOR) 20 MG tablet Take 20 mg by mouth Daily.     • Blood Glucose Monitoring Suppl (TRUE METRIX METER) w/Device kit Use as directed to test Blood Sugar. (Patient taking differently: States does not check sugar at home) 1 kit 0   • clopidogrel (PLAVIX) 75 MG tablet Take 75 mg by mouth Daily. LAST DOSE 12/12/19. PER DR TEE     • glucose blood test strip Use as directed to test Blood Sugar 2 times a day. (Patient taking differently: Use as directed to test Blood Sugar 2 times a day.    States does not check sugar at home) 50 each 0   • HYDROcodone-acetaminophen (NORCO) 5-325 MG per tablet Take 1 tablet by mouth Every 6 (Six) Hours As Needed for Severe Pain . 10 tablet 0   • ketorolac (TORADOL) 10 MG tablet Take 1 tablet by mouth Every 8 (Eight) Hours As Needed for Severe Pain . 15 tablet 0   • metFORMIN ER (GLUCOPHAGE-XR) 500 MG 24 hr tablet Take 500 mg by mouth Daily.     • metoprolol succinate XL (TOPROL-XL) 50 MG 24 hr tablet Take 50 mg by mouth Daily.     • naproxen (NAPROSYN) 500 MG tablet Take 500 mg by mouth Daily.     • ondansetron (ZOFRAN) 4 MG tablet Take 1 tablet by mouth Every 8 (Eight) Hours As Needed for Vomiting. 30 tablet 0   • oxyCODONE-acetaminophen (PERCOCET) 5-325 MG per tablet Take 1-2 tablets by mouth Every 4 (Four) Hours As Needed (Pain). 30 tablet 0   • TRUEPLUS LANCETS 28G misc Use as Directed to test Blood Sugar 2 times a day (Patient taking differently: States does not check sugar at home) 50 each 0     No current facility-administered  medications for this visit.         Allergies:      Allergies   Allergen Reactions   • Penicillins Hives        Past Surgical History:     Past Surgical History:   Procedure Laterality Date   • CARDIAC CATHETERIZATION     • COLONOSCOPY     • CORONARY STENT PLACEMENT  2015    x1 stent [place] facility   • FRACTURE SURGERY     • ORIF FIBULA FRACTURE Right 2019    Procedure: OPEN REDUCTION INTERNAL FIXATION RIGHT DISTAL FIBULA;  Surgeon: Christian Brooks MD;  Location: [facility];  Service: Orthopedics         Social History:     Social History     Socioeconomic History   • Marital status:      Spouse name: Not on file   • Number of children: Not on file   • Years of education: Not on file   • Highest education level: Not on file   Tobacco Use   • Smoking status: Former Smoker     Packs/day: 0.50     Years: 10.00     Pack years: 5.00     Types: Cigarettes     Last attempt to quit:      Years since quittin.6   • Smokeless tobacco: Never Used   Substance and Sexual Activity   • Alcohol use: Yes     Comment: social   • Drug use: Never   • Sexual activity: Defer       Family History:     Family History   Problem Relation Age of Onset   • Heart disease Father    • Heart attack Maternal Grandmother        Review of Systems:     Review of Systems   Constitutional: Negative.  Negative for chills, fatigue and fever.   HENT: Negative.    Eyes: Negative.    Respiratory: Negative.  Negative for cough and shortness of breath.    Cardiovascular: Negative.    Gastrointestinal: Positive for abdominal pain.   Endocrine: Negative.    Genitourinary: Positive for flank pain.   Musculoskeletal: Negative for back pain and joint swelling.   Allergic/Immunologic: Negative.    Neurological: Negative.    Hematological: Negative.    Psychiatric/Behavioral: Negative.        Physical Exam:     Physical Exam   Constitutional: He is oriented to person, place, and time. He appears well-developed and well-nourished.   HENT:    Head: Normocephalic and atraumatic.   Eyes: Pupils are equal, round, and reactive to light. Conjunctivae and EOM are normal.   Neck: Normal range of motion.   Cardiovascular: Normal rate, regular rhythm, normal heart sounds and intact distal pulses.   Pulmonary/Chest: Effort normal and breath sounds normal.   Abdominal: Soft. Bowel sounds are normal.   Musculoskeletal: Normal range of motion.   Neurological: He is alert and oriented to person, place, and time. He has normal reflexes.   Skin: Skin is warm and dry.   Psychiatric: He has a normal mood and affect. His behavior is normal. Judgment and thought content normal.   Nursing note and vitals reviewed.      I have reviewed the following portions of the patient's history: allergies, current medications, past family history, past medical history, past social history, past surgical history, problem list and ROS and confirm it's accurate.      Procedure:       Assessment/Plan:   Renal calculus-we discussed the presence of the stone we discussed the various therapeutic options available including percutaneous nephrostolithotomy, lithotripsy.  We discussed the risks of lithotripsy including the passage of stones the development of a large string of stones in the distal ureter known as Steinstrasse.  In the 3% incidence of that we will need to proceed with a ureteroscopy for obstructing fragments.  Extremely rare incidence of renal hematoma.  And the significance of this.  We discussed the absolute relative indicators for intervention including the presence of sepsis, and pain we cannot control is the primary need for urgent intervention.  We discussed placement of a stent if indicated and the management of the stent as well.  For lithotripsy on August 21.  Narcotic pain medication-patient has significant acute pain that I believe would be an indication for the use of narcotic pain medication.  I discussed the significant risks of pain medication and the fact that  this will be a short only option and I will give her no more than a three-day supply of pain medication.  And I will not plan long-term medication and that this will be sent to a pain clinic if at all becomes necessary.  We discussed signing a pain medication agreement and the fact that we're going to run a state KIM review to be sure the patient is not getting pain medication from elsewhere.  If this is the case we will not give pain medication.  As part of the patient's treatment plan of there being prescribed a controlled substance with potential for abuse.  This patient has been wait aware of the appropriate dose of such medications including, the risk for somnolence, limited ability to drive and/or safety and the significant potential for overdose.  It is been made clear that these medications are for the prescribed patient only without concomitant use of alcohol or other sepsis unless prescribed by the medical provider.  Has completed prescribing agreement detailing the terms of continue prescribing him a controlled substance.  Including monitoring Kim ports, the possibility of urine drug screens and pedal counts.  The patient is aware that we reviewed KIM reports on a regular basis and scan them into the chart.  History and physical examination exhibited continued safe and appropriate use of controlled substances.  Also discussed the fact that the new [place] legislation allows only a three-day prescription for pain medication.  In this situation he will be referred to a chronic pain clinic.  Cardiovascular disease-needs cardiac clearance            Patient's There is no height or weight on file to calculate BMI. BMI is above normal parameters. Recommendations include: educational material.              This document has been electronically signed by DEVIN ESCOTO MD August 11, 2020 08:34

## 2020-08-11 NOTE — PROGRESS NOTES
Chief Complaint:          Chief Complaint   Patient presents with   • Nephrolithiasis       HPI:   49 y.o. male who is a  presented to the emergency room with a right 12 mm UPJ stone.  This is his first stone.  He is never had surgery.  He had a recent ankle surgery and was immobilized and had a stent 5 years ago.  We dealing with a immobilization stone he has no family history.  He has intermittent colicky flank pain.  I am getting recommended lithotripsy but because of his stent he will need a cardiac clearance with Dr. Castillo as he missed his last appointment.  Renal calculus-we discussed the presence of the stone we discussed the various therapeutic options available including percutaneous nephrostolithotomy, lithotripsy.  We discussed the risks of lithotripsy including the passage of stones the development of a large string of stones in the distal ureter known as Steinstrasse.  In the 3% incidence of that we will need to proceed with a ureteroscopy for obstructing fragments.  Extremely rare incidence of renal hematoma.  And the significance of this.  We discussed the absolute relative indicators for intervention including the presence of sepsis, and pain we cannot control is the primary need for urgent intervention.  We discussed placement of a stent if indicated and the management of the stent as well.  ESWL-the patient is a candidate for extracorporeal shockwave lithotripsy.  We discussed the type of stone.  And the complications associated with the procedure including but not limited to pain in the flank, hematoma, spontaneous renal hemorrhage, and adequate fragmentation of stones.  The need for passage of the stones.  The need for concomitant additional procedures in the range of 24% the risk of a distal fragment in the range of 3% requiring ureteroscopic removal.        Past Medical History:        Past Medical History:   Diagnosis Date   • Arthritis    • Coronary artery disease    • Diabetes  mellitus (CMS/HCC)    • Elevated cholesterol    • Fracture    • Hyperlipidemia    • Hypertension    • Sleep apnea          Current Meds:     Current Outpatient Medications   Medication Sig Dispense Refill   • albuterol (PROVENTIL HFA;VENTOLIN HFA) 108 (90 Base) MCG/ACT inhaler Inhale 2 puffs Every 6 (Six) Hours As Needed for Wheezing or Shortness of Air.     • aspirin 81 MG EC tablet Take 243 mg by mouth Daily. Takes 3 tabs per dose     • atorvastatin (LIPITOR) 20 MG tablet Take 20 mg by mouth Daily.     • Blood Glucose Monitoring Suppl (TRUE METRIX METER) w/Device kit Use as directed to test Blood Sugar. (Patient taking differently: States does not check sugar at home) 1 kit 0   • clopidogrel (PLAVIX) 75 MG tablet Take 75 mg by mouth Daily. LAST DOSE 12/12/19. PER DR TEE     • glucose blood test strip Use as directed to test Blood Sugar 2 times a day. (Patient taking differently: Use as directed to test Blood Sugar 2 times a day.    States does not check sugar at home) 50 each 0   • HYDROcodone-acetaminophen (NORCO) 5-325 MG per tablet Take 1 tablet by mouth Every 6 (Six) Hours As Needed for Severe Pain . 10 tablet 0   • ketorolac (TORADOL) 10 MG tablet Take 1 tablet by mouth Every 8 (Eight) Hours As Needed for Severe Pain . 15 tablet 0   • metFORMIN ER (GLUCOPHAGE-XR) 500 MG 24 hr tablet Take 500 mg by mouth Daily.     • metoprolol succinate XL (TOPROL-XL) 50 MG 24 hr tablet Take 50 mg by mouth Daily.     • naproxen (NAPROSYN) 500 MG tablet Take 500 mg by mouth Daily.     • ondansetron (ZOFRAN) 4 MG tablet Take 1 tablet by mouth Every 8 (Eight) Hours As Needed for Vomiting. 30 tablet 0   • oxyCODONE-acetaminophen (PERCOCET) 5-325 MG per tablet Take 1-2 tablets by mouth Every 4 (Four) Hours As Needed (Pain). 30 tablet 0   • TRUEPLUS LANCETS 28G misc Use as Directed to test Blood Sugar 2 times a day (Patient taking differently: States does not check sugar at home) 50 each 0     No current facility-administered  medications for this visit.         Allergies:      Allergies   Allergen Reactions   • Penicillins Hives        Past Surgical History:     Past Surgical History:   Procedure Laterality Date   • CARDIAC CATHETERIZATION     • COLONOSCOPY     • CORONARY STENT PLACEMENT  2015    x1 stent [place] facility   • FRACTURE SURGERY     • ORIF FIBULA FRACTURE Right 2019    Procedure: OPEN REDUCTION INTERNAL FIXATION RIGHT DISTAL FIBULA;  Surgeon: Christian Brooks MD;  Location: [facility];  Service: Orthopedics         Social History:     Social History     Socioeconomic History   • Marital status:      Spouse name: Not on file   • Number of children: Not on file   • Years of education: Not on file   • Highest education level: Not on file   Tobacco Use   • Smoking status: Former Smoker     Packs/day: 0.50     Years: 10.00     Pack years: 5.00     Types: Cigarettes     Last attempt to quit:      Years since quittin.6   • Smokeless tobacco: Never Used   Substance and Sexual Activity   • Alcohol use: Yes     Comment: social   • Drug use: Never   • Sexual activity: Defer       Family History:     Family History   Problem Relation Age of Onset   • Heart disease Father    • Heart attack Maternal Grandmother        Review of Systems:     Review of Systems   Constitutional: Negative.  Negative for chills, fatigue and fever.   HENT: Negative.    Eyes: Negative.    Respiratory: Negative.  Negative for cough and shortness of breath.    Cardiovascular: Negative.    Gastrointestinal: Positive for abdominal pain.   Endocrine: Negative.    Genitourinary: Positive for flank pain.   Musculoskeletal: Negative for back pain and joint swelling.   Allergic/Immunologic: Negative.    Neurological: Negative.    Hematological: Negative.    Psychiatric/Behavioral: Negative.        Physical Exam:     Physical Exam   Constitutional: He is oriented to person, place, and time. He appears well-developed and well-nourished.   HENT:      Head: Normocephalic and atraumatic.   Eyes: Pupils are equal, round, and reactive to light. Conjunctivae and EOM are normal.   Neck: Normal range of motion.   Cardiovascular: Normal rate, regular rhythm, normal heart sounds and intact distal pulses.   Pulmonary/Chest: Effort normal and breath sounds normal.   Abdominal: Soft. Bowel sounds are normal.   Musculoskeletal: Normal range of motion.   Neurological: He is alert and oriented to person, place, and time. He has normal reflexes.   Skin: Skin is warm and dry.   Psychiatric: He has a normal mood and affect. His behavior is normal. Judgment and thought content normal.   Nursing note and vitals reviewed.      I have reviewed the following portions of the patient's history: allergies, current medications, past family history, past medical history, past social history, past surgical history, problem list and ROS and confirm it's accurate.      Procedure:       Assessment/Plan:   Renal calculus-we discussed the presence of the stone we discussed the various therapeutic options available including percutaneous nephrostolithotomy, lithotripsy.  We discussed the risks of lithotripsy including the passage of stones the development of a large string of stones in the distal ureter known as Steinstrasse.  In the 3% incidence of that we will need to proceed with a ureteroscopy for obstructing fragments.  Extremely rare incidence of renal hematoma.  And the significance of this.  We discussed the absolute relative indicators for intervention including the presence of sepsis, and pain we cannot control is the primary need for urgent intervention.  We discussed placement of a stent if indicated and the management of the stent as well.  For lithotripsy on August 21.  Narcotic pain medication-patient has significant acute pain that I believe would be an indication for the use of narcotic pain medication.  I discussed the significant risks of pain medication and the fact that  this will be a short only option and I will give her no more than a three-day supply of pain medication.  And I will not plan long-term medication and that this will be sent to a pain clinic if at all becomes necessary.  We discussed signing a pain medication agreement and the fact that we're going to run a state KIM review to be sure the patient is not getting pain medication from elsewhere.  If this is the case we will not give pain medication.  As part of the patient's treatment plan of there being prescribed a controlled substance with potential for abuse.  This patient has been wait aware of the appropriate dose of such medications including, the risk for somnolence, limited ability to drive and/or safety and the significant potential for overdose.  It is been made clear that these medications are for the prescribed patient only without concomitant use of alcohol or other sepsis unless prescribed by the medical provider.  Has completed prescribing agreement detailing the terms of continue prescribing him a controlled substance.  Including monitoring Kim ports, the possibility of urine drug screens and pedal counts.  The patient is aware that we reviewed KIM reports on a regular basis and scan them into the chart.  History and physical examination exhibited continued safe and appropriate use of controlled substances.  Also discussed the fact that the new [place] legislation allows only a three-day prescription for pain medication.  In this situation he will be referred to a chronic pain clinic.  Cardiovascular disease-needs cardiac clearance            Patient's There is no height or weight on file to calculate BMI. BMI is above normal parameters. Recommendations include: educational material.              This document has been electronically signed by DEVIN ESCOTO MD August 11, 2020 08:34

## 2020-08-12 PROBLEM — N20.0 RENAL CALCULUS, RIGHT: Status: ACTIVE | Noted: 2020-08-12

## 2020-08-12 RX ORDER — GENTAMICIN SULFATE 80 MG/100ML
80 INJECTION, SOLUTION INTRAVENOUS ONCE
Status: CANCELLED | OUTPATIENT
Start: 2020-08-12 | End: 2020-08-12

## 2020-08-17 ENCOUNTER — OFFICE VISIT (OUTPATIENT)
Dept: CARDIOLOGY | Facility: CLINIC | Age: 50
End: 2020-08-17

## 2020-08-17 VITALS
HEART RATE: 90 BPM | WEIGHT: 236.2 LBS | DIASTOLIC BLOOD PRESSURE: 83 MMHG | RESPIRATION RATE: 16 BRPM | HEIGHT: 67 IN | SYSTOLIC BLOOD PRESSURE: 132 MMHG | TEMPERATURE: 98.4 F | BODY MASS INDEX: 37.07 KG/M2

## 2020-08-17 DIAGNOSIS — R06.09 DYSPNEA ON EXERTION: ICD-10-CM

## 2020-08-17 DIAGNOSIS — E11.9 TYPE 2 DIABETES MELLITUS WITHOUT COMPLICATION, WITHOUT LONG-TERM CURRENT USE OF INSULIN (HCC): ICD-10-CM

## 2020-08-17 DIAGNOSIS — I25.10 ASCVD (ARTERIOSCLEROTIC CARDIOVASCULAR DISEASE): Primary | ICD-10-CM

## 2020-08-17 PROCEDURE — 99213 OFFICE O/P EST LOW 20 MIN: CPT | Performed by: PHYSICIAN ASSISTANT

## 2020-08-17 NOTE — PROGRESS NOTES
Alis Xie DO  Austin Sneed  1970 08/17/2020    Patient Active Problem List   Diagnosis   • Precordial pain   • Dyspnea on exertion   • ASCVD (arteriosclerotic cardiovascular disease), status post stenting about 3 years ago in [facility].    • Chest pain   • Erectile dysfunction   • Closed nondisplaced fracture of lateral malleolus of right fibula   • Morbidly obese (CMS/HCC)   • Type 2 diabetes mellitus without complication, without long-term current use of insulin (CMS/[facility])   • Displaced fracture of lateral malleolus of right fibula, initial encounter for closed fracture   • Renal calculus, right       Dear Alis Xie DO:    Subjective     History of Present Illness:    Chief Complaint   Patient presents with   • Surgical Clearance     kidney stone, Dr. Crenshaw   • Med Management     verbal       Austin Sneed is a pleasant 49 y.o. male with a past medical history significant for coronary artery disease status post stenting 4 to 5 years ago.  Patient comes in for routine cardiology follow-up.    Patient reports he has been doing well clinically and has no complaints bring forth. Patient denies any chest pain, shortness of breath, palpitations, dizziness, syncope or near syncope. He reports his blood pressure is doing well also.     Allergies   Allergen Reactions   • Penicillins Hives   :      Current Outpatient Medications:   •  albuterol (PROVENTIL HFA;VENTOLIN HFA) 108 (90 Base) MCG/ACT inhaler, Inhale 2 puffs Every 6 (Six) Hours As Needed for Wheezing or Shortness of Air., Disp: , Rfl:   •  atorvastatin (LIPITOR) 20 MG tablet, Take 20 mg by mouth Daily., Disp: , Rfl:   •  metFORMIN ER (GLUCOPHAGE-XR) 500 MG 24 hr tablet, Take 1,000 mg by mouth 2 (two) times a day., Disp: , Rfl:   •  metoprolol succinate XL (TOPROL-XL) 50 MG 24 hr tablet, Take 50 mg by mouth Daily., Disp: , Rfl:   •  naproxen (NAPROSYN) 500 MG tablet, Take 500 mg by mouth Daily., Disp: , Rfl:   •  ondansetron (ZOFRAN) 4 MG  tablet, Take 1 tablet by mouth Every 8 (Eight) Hours As Needed for Vomiting., Disp: 30 tablet, Rfl: 0  •  aspirin 81 MG EC tablet, Take 243 mg by mouth Daily. Takes 3 tabs per dose, Disp: , Rfl:   •  Blood Glucose Monitoring Suppl (TRUE METRIX METER) w/Device kit, Use as directed to test Blood Sugar. (Patient taking differently: States does not check sugar at home), Disp: 1 kit, Rfl: 0  •  clopidogrel (PLAVIX) 75 MG tablet, Take 75 mg by mouth Daily. LAST DOSE 19. PER DR TEE, Disp: , Rfl:   •  glucose blood test strip, Use as directed to test Blood Sugar 2 times a day. (Patient taking differently: Use as directed to test Blood Sugar 2 times a day.  States does not check sugar at home), Disp: 50 each, Rfl: 0  •  HYDROcodone-acetaminophen (NORCO) 5-325 MG per tablet, Take 1 tablet by mouth Every 6 (Six) Hours As Needed for Severe Pain ., Disp: 10 tablet, Rfl: 0  •  ketorolac (TORADOL) 10 MG tablet, Take 1 tablet by mouth Every 8 (Eight) Hours As Needed for Severe Pain ., Disp: 15 tablet, Rfl: 0  •  oxyCODONE-acetaminophen (PERCOCET) 5-325 MG per tablet, Take 1-2 tablets by mouth Every 4 (Four) Hours As Needed (Pain)., Disp: 30 tablet, Rfl: 0  •  TRUEPLUS LANCETS 28G misc, Use as Directed to test Blood Sugar 2 times a day (Patient taking differently: States does not check sugar at home), Disp: 50 each, Rfl: 0    The following portions of the patient's history were reviewed and updated as appropriate: allergies, current medications, past family history, past medical history, past social history, past surgical history and problem list.    Social History     Tobacco Use   • Smoking status: Former Smoker     Packs/day: 0.50     Years: 10.00     Pack years: 5.00     Types: Cigarettes     Last attempt to quit: 2015     Years since quittin.6   • Smokeless tobacco: Never Used   Substance Use Topics   • Alcohol use: Yes     Comment: social   • Drug use: Never       Review of Systems   Constitution: Negative for  "malaise/fatigue.   Cardiovascular: Negative for chest pain, dyspnea on exertion, irregular heartbeat, leg swelling and palpitations.   Respiratory: Negative for cough and shortness of breath.    Hematologic/Lymphatic: Negative for bleeding problem. Does not bruise/bleed easily.   Gastrointestinal: Negative for nausea and vomiting.   Neurological: Negative for weakness.       Objective   Vitals:    08/17/20 1135   BP: 132/83   Pulse: 90   Resp: 16   Temp: 98.4 °F (36.9 °C)   Weight: 107 kg (236 lb 3.2 oz)   Height: 170.2 cm (67\")     Body mass index is 36.99 kg/m².    Physical Exam   Constitutional: He is oriented to person, place, and time. He appears well-developed and well-nourished. No distress.   HENT:   Head: Normocephalic and atraumatic.   Cardiovascular: Normal rate, regular rhythm and normal heart sounds.   Pulmonary/Chest: Effort normal and breath sounds normal. No respiratory distress.   Musculoskeletal: He exhibits no edema.   Neurological: He is alert and oriented to person, place, and time.   Skin: He is not diaphoretic.       Lab Results   Component Value Date     08/10/2020    K 4.8 08/10/2020     08/10/2020    CO2 25.5 08/10/2020    BUN 21 (H) 08/10/2020    CREATININE 0.96 08/10/2020    GLUCOSE 101 (H) 08/10/2020    CALCIUM 9.8 08/10/2020    AST 15 08/10/2020    ALT 26 08/10/2020    ALKPHOS 46 08/10/2020     Lab Results   Component Value Date    CKTOTAL 108 08/10/2020     Lab Results   Component Value Date    WBC 9.49 08/10/2020    HGB 15.1 08/10/2020    HCT 46.5 08/10/2020     08/10/2020     No results found for: INR  Lab Results   Component Value Date    MG 2.0 08/10/2020     Lab Results   Component Value Date    TSH 4.170 08/10/2020    TRIG 97 07/20/2019    HDL 35 (L) 07/20/2019    LDL 43 07/20/2019      Lab Results   Component Value Date    BNP <2.0 07/25/2018       During this visit the following were done:  Labs Reviewed [x]    Labs Ordered []    Radiology Reports Reviewed " [x]    Radiology Ordered []    PCP Records Reviewed []    Referring Provider Records Reviewed []    ER Records Reviewed []    Hospital Records Reviewed []    History Obtained From Family []    Radiology Images Reviewed []    Other Reviewed []    Records Requested []       Procedures    Assessment/Plan    Diagnosis Plan   1. ASCVD (arteriosclerotic cardiovascular disease), status post stenting about 3 years ago in [facility].      2. Dyspnea on exertion     3. Type 2 diabetes mellitus without complication, without long-term current use of insulin (CMS/[facility])              Recommendations:  1. Overall patient is doing well clinically. He does want cardiac clearance for renal calculi removal. He was told to already hold his aspirin and plavix for 7-10 prior to his surgery, which is longer than is needed. He has already exposed himself cardiovascular risk by stopping his DAPT so should go ahead with the procedure so that he does expose the risk twice. Will discuss Dr. Castillo for clearing him.         Return in about 6 months (around 2/17/2021).    As always, I appreciate very much the opportunity to participate in the cardiovascular care of your patients.      With Best Regards,    Lloyd Escobar PA-C

## 2020-08-18 NOTE — DISCHARGE INSTRUCTIONS

## 2020-08-19 ENCOUNTER — LAB (OUTPATIENT)
Dept: LAB | Facility: HOSPITAL | Age: 50
End: 2020-08-19

## 2020-08-19 ENCOUNTER — APPOINTMENT (OUTPATIENT)
Dept: PREADMISSION TESTING | Facility: HOSPITAL | Age: 50
End: 2020-08-19

## 2020-08-19 DIAGNOSIS — N20.0 RENAL CALCULUS, RIGHT: Primary | ICD-10-CM

## 2020-08-19 DIAGNOSIS — N20.0 RENAL CALCULUS, RIGHT: ICD-10-CM

## 2020-08-19 PROCEDURE — C9803 HOPD COVID-19 SPEC COLLECT: HCPCS

## 2020-08-19 PROCEDURE — U0004 COV-19 TEST NON-CDC HGH THRU: HCPCS

## 2020-08-19 PROCEDURE — U0002 COVID-19 LAB TEST NON-CDC: HCPCS

## 2020-08-20 LAB
REF LAB TEST METHOD: NORMAL
SARS-COV-2 RNA RESP QL NAA+PROBE: NOT DETECTED

## 2020-08-21 ENCOUNTER — HOSPITAL ENCOUNTER (OUTPATIENT)
Facility: HOSPITAL | Age: 50
Discharge: HOME | End: 2020-08-21
Attending: UROLOGY | Admitting: UROLOGY

## 2020-08-21 ENCOUNTER — ANESTHESIA (OUTPATIENT)
Dept: PERIOP | Facility: HOSPITAL | Age: 50
End: 2020-08-21

## 2020-08-21 ENCOUNTER — ANESTHESIA EVENT (OUTPATIENT)
Dept: PERIOP | Facility: HOSPITAL | Age: 50
End: 2020-08-21

## 2020-08-21 VITALS
WEIGHT: 233 LBS | RESPIRATION RATE: 16 BRPM | OXYGEN SATURATION: 99 % | HEART RATE: 72 BPM | DIASTOLIC BLOOD PRESSURE: 84 MMHG | BODY MASS INDEX: 36.57 KG/M2 | HEIGHT: 67 IN | TEMPERATURE: 98 F | SYSTOLIC BLOOD PRESSURE: 130 MMHG

## 2020-08-21 DIAGNOSIS — N20.0 RENAL CALCULUS, RIGHT: ICD-10-CM

## 2020-08-21 LAB — GLUCOSE BLDC GLUCOMTR-MCNC: 108 MG/DL (ref 70–130)

## 2020-08-21 PROCEDURE — 82962 GLUCOSE BLOOD TEST: CPT

## 2020-08-21 PROCEDURE — 50590 FRAGMENTING OF KIDNEY STONE: CPT | Performed by: UROLOGY

## 2020-08-21 PROCEDURE — 25010000002 MIDAZOLAM PER 1 MG: Performed by: NURSE ANESTHETIST, CERTIFIED REGISTERED

## 2020-08-21 PROCEDURE — 25010000002 GENTAMICIN PER 80 MG: Performed by: UROLOGY

## 2020-08-21 PROCEDURE — 25010000002 PROPOFOL 10 MG/ML EMULSION: Performed by: NURSE ANESTHETIST, CERTIFIED REGISTERED

## 2020-08-21 PROCEDURE — 25010000002 ONDANSETRON PER 1 MG: Performed by: NURSE ANESTHETIST, CERTIFIED REGISTERED

## 2020-08-21 PROCEDURE — 25010000002 FENTANYL CITRATE (PF) 100 MCG/2ML SOLUTION: Performed by: NURSE ANESTHETIST, CERTIFIED REGISTERED

## 2020-08-21 RX ORDER — ONDANSETRON 2 MG/ML
INJECTION INTRAMUSCULAR; INTRAVENOUS AS NEEDED
Status: DISCONTINUED | OUTPATIENT
Start: 2020-08-21 | End: 2020-08-21 | Stop reason: SURG

## 2020-08-21 RX ORDER — SODIUM CHLORIDE, SODIUM LACTATE, POTASSIUM CHLORIDE, CALCIUM CHLORIDE 600; 310; 30; 20 MG/100ML; MG/100ML; MG/100ML; MG/100ML
125 INJECTION, SOLUTION INTRAVENOUS CONTINUOUS
Status: DISCONTINUED | OUTPATIENT
Start: 2020-08-21 | End: 2020-08-21 | Stop reason: HOSPADM

## 2020-08-21 RX ORDER — GENTAMICIN SULFATE 80 MG/100ML
80 INJECTION, SOLUTION INTRAVENOUS ONCE
Status: COMPLETED | OUTPATIENT
Start: 2020-08-21 | End: 2020-08-21

## 2020-08-21 RX ORDER — FENTANYL CITRATE 50 UG/ML
50 INJECTION, SOLUTION INTRAMUSCULAR; INTRAVENOUS
Status: DISCONTINUED | OUTPATIENT
Start: 2020-08-21 | End: 2020-08-21 | Stop reason: HOSPADM

## 2020-08-21 RX ORDER — MIDAZOLAM HYDROCHLORIDE 1 MG/ML
1 INJECTION INTRAMUSCULAR; INTRAVENOUS
Status: DISCONTINUED | OUTPATIENT
Start: 2020-08-21 | End: 2020-08-21 | Stop reason: HOSPADM

## 2020-08-21 RX ORDER — MEPERIDINE HYDROCHLORIDE 25 MG/ML
12.5 INJECTION INTRAMUSCULAR; INTRAVENOUS; SUBCUTANEOUS
Status: DISCONTINUED | OUTPATIENT
Start: 2020-08-21 | End: 2020-08-21 | Stop reason: HOSPADM

## 2020-08-21 RX ORDER — IPRATROPIUM BROMIDE AND ALBUTEROL SULFATE 2.5; .5 MG/3ML; MG/3ML
3 SOLUTION RESPIRATORY (INHALATION) ONCE AS NEEDED
Status: DISCONTINUED | OUTPATIENT
Start: 2020-08-21 | End: 2020-08-21 | Stop reason: HOSPADM

## 2020-08-21 RX ORDER — PROPOFOL 10 MG/ML
VIAL (ML) INTRAVENOUS AS NEEDED
Status: DISCONTINUED | OUTPATIENT
Start: 2020-08-21 | End: 2020-08-21 | Stop reason: SURG

## 2020-08-21 RX ORDER — MIDAZOLAM HYDROCHLORIDE 1 MG/ML
INJECTION INTRAMUSCULAR; INTRAVENOUS AS NEEDED
Status: DISCONTINUED | OUTPATIENT
Start: 2020-08-21 | End: 2020-08-21 | Stop reason: SURG

## 2020-08-21 RX ORDER — FENTANYL CITRATE 50 UG/ML
INJECTION, SOLUTION INTRAMUSCULAR; INTRAVENOUS AS NEEDED
Status: DISCONTINUED | OUTPATIENT
Start: 2020-08-21 | End: 2020-08-21 | Stop reason: SURG

## 2020-08-21 RX ORDER — SODIUM CHLORIDE 0.9 % (FLUSH) 0.9 %
10 SYRINGE (ML) INJECTION EVERY 12 HOURS SCHEDULED
Status: DISCONTINUED | OUTPATIENT
Start: 2020-08-21 | End: 2020-08-21 | Stop reason: HOSPADM

## 2020-08-21 RX ORDER — HYDROCODONE BITARTRATE AND ACETAMINOPHEN 10; 325 MG/1; MG/1
1 TABLET ORAL EVERY 4 HOURS PRN
Qty: 12 TABLET | Refills: 0 | Status: SHIPPED | OUTPATIENT
Start: 2020-08-21 | End: 2020-09-09

## 2020-08-21 RX ORDER — ONDANSETRON 2 MG/ML
4 INJECTION INTRAMUSCULAR; INTRAVENOUS AS NEEDED
Status: DISCONTINUED | OUTPATIENT
Start: 2020-08-21 | End: 2020-08-21 | Stop reason: HOSPADM

## 2020-08-21 RX ORDER — OXYCODONE HYDROCHLORIDE AND ACETAMINOPHEN 5; 325 MG/1; MG/1
1 TABLET ORAL ONCE AS NEEDED
Status: DISCONTINUED | OUTPATIENT
Start: 2020-08-21 | End: 2020-08-21 | Stop reason: HOSPADM

## 2020-08-21 RX ORDER — SODIUM CHLORIDE 0.9 % (FLUSH) 0.9 %
10 SYRINGE (ML) INJECTION AS NEEDED
Status: DISCONTINUED | OUTPATIENT
Start: 2020-08-21 | End: 2020-08-21 | Stop reason: HOSPADM

## 2020-08-21 RX ADMIN — GENTAMICIN SULFATE 80 MG: 80 INJECTION, SOLUTION INTRAVENOUS at 07:25

## 2020-08-21 RX ADMIN — FENTANYL CITRATE 100 MCG: 50 INJECTION INTRAMUSCULAR; INTRAVENOUS at 07:24

## 2020-08-21 RX ADMIN — SODIUM CHLORIDE, POTASSIUM CHLORIDE, SODIUM LACTATE AND CALCIUM CHLORIDE: 600; 310; 30; 20 INJECTION, SOLUTION INTRAVENOUS at 07:24

## 2020-08-21 RX ADMIN — ONDANSETRON 4 MG: 2 INJECTION INTRAMUSCULAR; INTRAVENOUS at 07:24

## 2020-08-21 RX ADMIN — SODIUM CHLORIDE, POTASSIUM CHLORIDE, SODIUM LACTATE AND CALCIUM CHLORIDE 125 ML/HR: 600; 310; 30; 20 INJECTION, SOLUTION INTRAVENOUS at 07:00

## 2020-08-21 RX ADMIN — PROPOFOL 150 MG: 10 INJECTION, EMULSION INTRAVENOUS at 07:27

## 2020-08-21 RX ADMIN — MIDAZOLAM HYDROCHLORIDE 2 MG: 1 INJECTION, SOLUTION INTRAMUSCULAR; INTRAVENOUS at 07:24

## 2020-08-21 NOTE — ANESTHESIA PROCEDURE NOTES
Airway  Urgency: elective    Date/Time: 8/21/2020 7:28 AM  Airway not difficult    General Information and Staff    Patient location during procedure: OR  Anesthesiologist: Siddhartha Rivera DO  CRNA: Afsaneh Reyes CRNA    Indications and Patient Condition  Indications for airway management: airway protection    Preoxygenated: yes  Mask difficulty assessment: 0 - not attempted    Final Airway Details  Final airway type: supraglottic airway      Successful airway: classic and unique  Size 4    Number of attempts at approach: 1  Assessment: lips, teeth, and gum same as pre-op

## 2020-08-21 NOTE — ANESTHESIA PREPROCEDURE EVALUATION
Anesthesia Evaluation     NPO Solid Status: > 8 hours  NPO Liquid Status: > 8 hours           Airway   Mallampati: III  Neck ROM: full  Possible difficult intubation  Dental - normal exam     Pulmonary - normal exam   Cardiovascular - normal exam  Exercise tolerance: excellent (>7 METS)    NYHA Classification: II        Neuro/Psych- negative ROS  GI/Hepatic/Renal/Endo      Musculoskeletal (-) negative ROS    Abdominal  - normal exam   Substance History - negative use     OB/GYN negative ob/gyn ROS         Other                        Anesthesia Plan    ASA 3     general     intravenous and inhalational induction     Anesthetic plan, all risks, benefits, and alternatives have been provided, discussed and informed consent has been obtained with: patient.  Use of blood products discussed with patient .   Plan discussed with CRNA.

## 2020-08-21 NOTE — ANESTHESIA POSTPROCEDURE EVALUATION
Patient: Austin Sneed    Procedure Summary     Date:  08/21/20 Room / Location:  [facility] OR  /  [facility] OR    Anesthesia Start:  0725 Anesthesia Stop:  0801    Procedure:  EXTRACORPOREAL SHOCKWAVE LITHOTRIPSY (Right ) Diagnosis:       Renal calculus, right      (Renal calculus, right [N20.0])    Surgeon:  Pablo Crenshaw MD Provider:  Siddhartha Rivera DO    Anesthesia Type:  general ASA Status:  3          Anesthesia Type: general    Vitals  Vitals Value Taken Time   /85 8/21/2020  8:33 AM   Temp 97.3 °F (36.3 °C) 8/21/2020  8:03 AM   Pulse 73 8/21/2020  8:33 AM   Resp 11 8/21/2020  8:33 AM   SpO2 99 % 8/21/2020  8:33 AM           Post Anesthesia Care and Evaluation    Patient location during evaluation: PHASE II  Patient participation: complete - patient participated  Level of consciousness: awake and alert  Pain score: 1  Pain management: adequate  Airway patency: patent  Anesthetic complications: No anesthetic complications  PONV Status: controlled  Cardiovascular status: acceptable  Respiratory status: acceptable  Hydration status: acceptable

## 2020-08-21 NOTE — OP NOTE
EXTRACORPOREAL SHOCKWAVE LITHOTRIPSY  Procedure Note    Austin Sneed  8/21/2020    Pre-op Diagnosis:   Renal calculus, right [N20.0]    Post-op Diagnosis:     Post-Op Diagnosis Codes:     * Renal calculus, right [N20.0]    Procedure/CPT® Codes:  49-year-old white male with a right 12 mm lower pole stone for lithotripsy.  ESWL-the patient is a candidate for extracorporeal shockwave lithotripsy.  We discussed the type of stone.  And the complications associated with the procedure including but not limited to pain in the flank, hematoma, spontaneous renal hemorrhage, and adequate fragmentation of stones.  The need for passage of the stones.  The need for concomitant additional procedures in the range of 24% the risk of a distal fragment in the range of 3% requiring ureteroscopic removal..  Fact that sometimes a stent is indicated based on the size and the density of the stone as determined on the CAT scan.  Following an informed consent he is brought to the operative suite and underwent induction of general endotracheal anesthetic the stone was localized at F2 and a total of 3000 shockwaves administered without complication.  There was excellent fragmentation he was awake and alert return to recovery room          Procedure(s):  EXTRACORPOREAL SHOCKWAVE LITHOTRIPSY    Surgeon(s):  Pablo Crenshaw MD    Anesthesia: see anesthesia record    Staff:   Circulator: Moni Nicole RN  Relief Circulator: Dwight Schreiber RN  Scrub Person: Hue Coelho    Estimated Blood Loss: none  Urine Voided: * No values recorded between 8/21/2020  7:25 AM and 8/21/2020  7:47 AM *    Specimens:                None      Drains: None    Findings: Good fragmentation    Blood: N/A    Complications: None    Grafts and Implants: None    Pablo Crenshaw MD     Date: 8/21/2020  Time: 07:47

## 2020-09-04 ENCOUNTER — OFFICE VISIT (OUTPATIENT)
Dept: UROLOGY | Facility: CLINIC | Age: 50
End: 2020-09-04

## 2020-09-04 ENCOUNTER — HOSPITAL ENCOUNTER (OUTPATIENT)
Dept: GENERAL RADIOLOGY | Facility: HOSPITAL | Age: 50
Discharge: HOME | End: 2020-09-04
Admitting: NURSE PRACTITIONER

## 2020-09-04 VITALS — WEIGHT: 243.4 LBS | BODY MASS INDEX: 38.2 KG/M2 | HEIGHT: 67 IN | TEMPERATURE: 97.3 F

## 2020-09-04 DIAGNOSIS — N20.0 RENAL CALCULUS, RIGHT: Primary | ICD-10-CM

## 2020-09-04 DIAGNOSIS — N42.9 DISORDER OF PROSTATE: ICD-10-CM

## 2020-09-04 DIAGNOSIS — R35.0 URINE FREQUENCY: ICD-10-CM

## 2020-09-04 LAB
BILIRUB BLD-MCNC: NEGATIVE MG/DL
CLARITY, POC: CLEAR
COLOR UR: YELLOW
GLUCOSE UR STRIP-MCNC: NEGATIVE MG/DL
KETONES UR QL: NEGATIVE
LEUKOCYTE EST, POC: NEGATIVE
NITRITE UR-MCNC: NEGATIVE MG/ML
PH UR: 5 [PH] (ref 5–8)
PROT UR STRIP-MCNC: NEGATIVE MG/DL
PSA SERPL-MCNC: 1.54 NG/ML (ref 0–4)
RBC # UR STRIP: NEGATIVE /UL
SP GR UR: 1.03 (ref 1–1.03)
UROBILINOGEN UR QL: NORMAL

## 2020-09-04 PROCEDURE — 74019 RADEX ABDOMEN 2 VIEWS: CPT | Performed by: RADIOLOGY

## 2020-09-04 PROCEDURE — 51798 US URINE CAPACITY MEASURE: CPT | Performed by: NURSE PRACTITIONER

## 2020-09-04 PROCEDURE — 84153 ASSAY OF PSA TOTAL: CPT | Performed by: NURSE PRACTITIONER

## 2020-09-04 PROCEDURE — 74018 RADEX ABDOMEN 1 VIEW: CPT

## 2020-09-04 PROCEDURE — 99024 POSTOP FOLLOW-UP VISIT: CPT | Performed by: NURSE PRACTITIONER

## 2020-09-04 RX ORDER — SILDENAFIL 100 MG/1
TABLET, FILM COATED ORAL
COMMUNITY
Start: 2020-08-13 | End: 2020-09-09

## 2020-09-04 RX ORDER — ETODOLAC 400 MG/1
400 TABLET, EXTENDED RELEASE ORAL DAILY
Qty: 20 TABLET | Refills: 0 | Status: SHIPPED | OUTPATIENT
Start: 2020-09-04 | End: 2020-09-09

## 2020-09-04 RX ORDER — CYCLOBENZAPRINE HCL 5 MG
5 TABLET ORAL
COMMUNITY
Start: 2020-06-16 | End: 2020-09-09

## 2020-09-04 NOTE — PROGRESS NOTES
Chief Complaint:          Chief Complaint   Patient presents with   • Nephrolithiasis     Surgery fu        HPI:   49 y.o. male.  Right Renal Calculus/Post ESWL: Patient is seen for follow up today. He reports doing relatively well post procedure ESWL on 08/21/2020 for a right 12mm UPJ stone by Dr. Crenshaw. He denies having any post opt complications such as fevers, chills, bleeding, hematoma,  and has had no discomfort what soever.  He reports passing adequate fragmentation's, states his wife will drop of some specimens letter.    Patient denies having any pain or discomfort post surgery.  He is in no apparent distress during exam today.  He however reports urine frequency that is persistent at night only( reports nocturia 5-6 times).  He denies dysuria, urgency, or burning on urination.  He denies any episodes of gross hematuria.  Denies N/V/D, no chills or fevers, denies pelvic pressure discomfort, he denies bilateral flank pain or CVA tenderness.    He is a controlled diabetic with increased thirst/increased PO fluid intake. His urine dipstick is completely negative for any infection today, It is negative for microscopic hematuria.  His PVR is 0, his IPSS score is 9.    His past medical history as listed below      Past Medical History:        Past Medical History:   Diagnosis Date   • Arthritis    • Coronary artery disease    • Diabetes mellitus (CMS/[facility])    • Elevated cholesterol    • Fracture    • GERD (gastroesophageal reflux disease)    • Hyperlipidemia    • Hypertension    • Kidney stones    • Sleep apnea     no c-pap     The following portions of the patient's history were reviewed and updated as appropriate: allergies, current medications, past family history, past medical history, past social history, past surgical history and problem list.    Current Meds:     Current Outpatient Medications   Medication Sig Dispense Refill   • albuterol (PROVENTIL HFA;VENTOLIN HFA) 108 (90 Base) MCG/ACT inhaler Inhale  2 puffs Every 6 (Six) Hours As Needed for Wheezing or Shortness of Air.     • aspirin 81 MG EC tablet Take 243 mg by mouth Daily. Takes 3 tabs per dose     • atorvastatin (LIPITOR) 20 MG tablet Take 20 mg by mouth Daily.     • Blood Glucose Monitoring Suppl (TRUE METRIX METER) w/Device kit Use as directed to test Blood Sugar. (Patient taking differently: States does not check sugar at home) 1 kit 0   • clopidogrel (PLAVIX) 75 MG tablet Take 75 mg by mouth Daily. LAST DOSE 12/12/19. PER DR TEE     • cyclobenzaprine (FLEXERIL) 5 MG tablet 5 mg.     • glucose blood test strip Use as directed to test Blood Sugar 2 times a day. (Patient taking differently: Use as directed to test Blood Sugar 2 times a day.    States does not check sugar at home) 50 each 0   • HYDROcodone-acetaminophen (NORCO)  MG per tablet Take 1 tablet by mouth Every 4 (Four) Hours As Needed for Moderate Pain  (Pain). 12 tablet 0   • HYDROcodone-acetaminophen (NORCO) 5-325 MG per tablet Take 1 tablet by mouth Every 6 (Six) Hours As Needed for Severe Pain . 10 tablet 0   • metFORMIN ER (GLUCOPHAGE-XR) 500 MG 24 hr tablet Take 1,000 mg by mouth 2 (two) times a day.     • metoprolol succinate XL (TOPROL-XL) 50 MG 24 hr tablet Take 50 mg by mouth Daily.     • naproxen (NAPROSYN) 500 MG tablet Take 500 mg by mouth Daily.     • ondansetron (ZOFRAN) 4 MG tablet Take 1 tablet by mouth Every 8 (Eight) Hours As Needed for Vomiting. 30 tablet 0   • sildenafil (Viagra) 100 MG tablet TAKE ONE TABLET BY MOUTH EVERY DAY AS NEEDED APPROXIMATELY ONE HOUR BEFORE SEXUAL ACTIVITY     • TRUEPLUS LANCETS 28G misc Use as Directed to test Blood Sugar 2 times a day (Patient taking differently: States does not check sugar at home) 50 each 0     No current facility-administered medications for this visit.         Allergies:      Allergies   Allergen Reactions   • Penicillins Hives        Past Surgical History:     Past Surgical History:   Procedure Laterality Date   •  CARDIAC CATHETERIZATION     • COLONOSCOPY     • CORONARY STENT PLACEMENT  2015    x1 stent [place] [facility]   • EXTRACORPOREAL SHOCK WAVE LITHOTRIPSY (ESWL) Right 2020    Procedure: EXTRACORPOREAL SHOCKWAVE LITHOTRIPSY;  Surgeon: Pablo Crenshaw MD;  Location: [facility];  Service: Urology;  Laterality: Right;   • FRACTURE SURGERY Right     orif   • ORIF FIBULA FRACTURE Right 2019    Procedure: OPEN REDUCTION INTERNAL FIXATION RIGHT DISTAL FIBULA;  Surgeon: Christian Brooks MD;  Location: [facility];  Service: Orthopedics         Social History:     Social History     Socioeconomic History   • Marital status:      Spouse name: Not on file   • Number of children: Not on file   • Years of education: Not on file   • Highest education level: Not on file   Tobacco Use   • Smoking status: Former Smoker     Packs/day: 0.50     Years: 10.00     Pack years: 5.00     Types: Cigarettes     Last attempt to quit:      Years since quittin.6   • Smokeless tobacco: Never Used   Substance and Sexual Activity   • Alcohol use: Yes     Comment: social   • Drug use: Never   • Sexual activity: Defer       Family History:     Family History   Problem Relation Age of Onset   • Heart disease Father    • Heart attack Maternal Grandmother        Review of Systems:     Review of Systems   Constitutional: Negative for chills, fatigue and fever.   HENT: Negative for congestion and sinus pressure.    Eyes: Negative for blurred vision and double vision.   Respiratory: Negative for shortness of breath and wheezing.    Gastrointestinal: Negative for abdominal pain, constipation, diarrhea, nausea and vomiting.   Genitourinary: Positive for frequency and nocturia. Negative for difficulty urinating, discharge, dysuria, flank pain, genital sores, hematuria, penile pain, penile swelling, scrotal swelling, testicular pain, urgency and urinary incontinence.   Neurological: Negative for dizziness, weakness and confusion.     Psychiatric/Behavioral: Negative for agitation, behavioral problems and decreased concentration.      IPSS Questionnaire (AUA-7):  Over the past month…    1)  How often have you had a sensation of not emptying your bladder completely after you finish urinating?  0 - Not at all   2)  How often have you had to urinate again less than two hours after you finished urinating? 2 - Less than half the time   3)  How often have you found you stopped and started again several times when you urinated?  0 - Not at all   4) How difficult have you found it to postpone urination?  2 - Less than half the time   5) How often have you had a weak urinary stream?  0 - Not at all   6) How often have you had to push or strain to begin urination?  0 - Not at all   7) How many times did you most typically get up to urinate from the time you went to bed until the time you got up in the morning?  5 - 5+ times   Total score:  0-7 mildly symptomatic    8-19 moderately symptomatic                                                     9    20-35 severely symptomatic         Physical Exam:     Physical Exam   Constitutional: He is oriented to person, place, and time. He appears well-developed and well-nourished.   HENT:   Head: Normocephalic and atraumatic.   Right Ear: External ear normal.   Left Ear: External ear normal.   Eyes: Pupils are equal, round, and reactive to light. Conjunctivae and EOM are normal. Right eye exhibits no discharge. Left eye exhibits no discharge.   Neck: Normal range of motion. Neck supple. No tracheal deviation present. No thyromegaly present.   Cardiovascular: Normal rate and regular rhythm. Exam reveals no friction rub.   No murmur heard.  Pulmonary/Chest: Effort normal and breath sounds normal. No stridor. No respiratory distress.   Abdominal: Soft. Bowel sounds are normal. He exhibits no distension. There is no guarding.   Genitourinary: Rectum normal, testes normal and penis normal. Rectal exam shows guaiac  negative stool. Uncircumcised. No penile tenderness. No discharge found.   Musculoskeletal: Normal range of motion. He exhibits no edema or deformity.   Neurological: He is alert and oriented to person, place, and time. No cranial nerve deficit. Coordination normal.   Skin: Skin is warm and dry. Capillary refill takes less than 2 seconds. No pallor.   Psychiatric: He has a normal mood and affect. His behavior is normal. Judgment and thought content normal.       Procedure:       Assessment/Plan:     Bilateral Nephrolithiasis /Urine Frequency/Nocturia:  Patient is seen for follow up today. He reports doing relatively well post procedure ESWL on 08/21/2020 for a Right 12 mm  ureteral  stone. He denies having any post opt complications such as fevers, chills, bleeding, hematoma,  and has had no discomfort what soever.  He reports urine frequency and nocturia for over a week today, his urine dipstick is completely negative for any infection, IPSS score 9, PVR 0.    We both reviewed and discussed  his repeat KUB today which shows Stones are seen bilaterally, probably bilateral urolithiasis. There is also moderate volume stool in his colon.       He has been encouraged to continue nonoperative management by adequately  increasing p.o. fluid intake, to at least 1 to 2 L of water daily, increased activity with ambulation, exercise, utilize hot tubs, hot showers, pain and nausea control, Flomax used as off label setting to enhance medical expulsive therapy. Patient denies any perineal discomfort or lower back pain consistent with prostatitis.     Also Explained the various parameters regarding spontaneous passage including the notion that a 2-3 mm stone has a high likelihood of spontaneous passage versus a larger stonebeing caught up in the upper areas of the urinary tract.  We discussed the indicators for intervention including  absolute indicators such as sepsis and uncontrollable severe pain as well as  the relative  indicators of moderate pain that is well-controlled with various analgesia.     Patient has been given a refill of Non Narcotic pain medication(Etodolac per his request), just in case and Nausea medication and Flomax for medical expulsive therapy.  He has also been advised bowel regimen with MiraLAX daily.    Checked his PSA today,I will call him with results.    We will see him back in 4 weeks for follow-up and repeat KUB, or sooner if he develops uncontrolled /severe pain discomfort    Patient is agreeable plan of care    Patient reports that he is not currently experiencing any symptoms of urinary incontinence.    Patient's Body mass index is 36.49 kg/m². BMI is above normal parameters. Recommendations include: educational material, exercise counseling and nutrition counseling.    Smoking Cessation Counseling:  Former smoker. QUIT 2015  Patient does not currently use any tobacco products.     Counseling was given to patient for the following topics diagnostic results including: Right flank Pain /Neprolithiasis and instructions for management as follows: Increase ambulation, increase po fluids, flomax, Pain/nausea control, kidney stone prevention diet. The interim medical history and current results were reviewed.  A treatment plan with follow-up was made for Renal calculus, right [N20.0].          This document has been electronically signed by Griselda Cheng-Akwa, APRN September 4, 2020 08:15

## 2020-09-09 ENCOUNTER — HOSPITAL ENCOUNTER (OUTPATIENT)
Facility: HOSPITAL | Age: 50
Setting detail: HOSPITAL OUTPATIENT SURGERY
End: 2020-09-09
Attending: SURGERY | Admitting: SURGERY

## 2020-09-09 ENCOUNTER — OFFICE VISIT (OUTPATIENT)
Dept: SURGERY | Facility: CLINIC | Age: 50
End: 2020-09-09

## 2020-09-09 VITALS
DIASTOLIC BLOOD PRESSURE: 92 MMHG | SYSTOLIC BLOOD PRESSURE: 120 MMHG | HEIGHT: 67 IN | WEIGHT: 238 LBS | BODY MASS INDEX: 37.35 KG/M2

## 2020-09-09 DIAGNOSIS — K42.9 UMBILICAL HERNIA WITHOUT OBSTRUCTION AND WITHOUT GANGRENE: Primary | ICD-10-CM

## 2020-09-09 PROCEDURE — 99203 OFFICE O/P NEW LOW 30 MIN: CPT | Performed by: SURGERY

## 2020-09-09 RX ORDER — ONDANSETRON 2 MG/ML
4 INJECTION INTRAMUSCULAR; INTRAVENOUS EVERY 6 HOURS PRN
Status: CANCELLED | OUTPATIENT
Start: 2020-09-09

## 2020-09-09 RX ORDER — CLINDAMYCIN PHOSPHATE 900 MG/50ML
900 INJECTION INTRAVENOUS ONCE
Status: CANCELLED | OUTPATIENT
Start: 2020-09-09 | End: 2020-09-09

## 2020-09-09 NOTE — PROGRESS NOTES
Subjective   Austin Sneed is a 49 y.o. male is being seen for consultation today at the request of Alis Xie DO    Austin Sneed is a 49 y.o. male With cardiac history presenting with reducible umbilical hernia.  The patient is on aspirin and Plavix for history of stents.  He recently underwent lithotripsy for kidney stones and held aspirin and Plavix perioperatively.  He has a painful fat-containing incarcerated umbilical hernia without obstructive symptoms or cellulitis.  He would like to undergo repair.  He is diabetic.  Body mass index 37.3.      Past Medical History:   Diagnosis Date   • Arthritis    • Coronary artery disease    • Diabetes mellitus (CMS/HCC)    • Elevated cholesterol    • Fracture    • GERD (gastroesophageal reflux disease)    • Hyperlipidemia    • Hypertension    • Kidney stones    • Sleep apnea     no c-pap       Family History   Problem Relation Age of Onset   • Heart disease Father    • Heart attack Maternal Grandmother        Social History     Socioeconomic History   • Marital status:      Spouse name: Not on file   • Number of children: Not on file   • Years of education: Not on file   • Highest education level: Not on file   Tobacco Use   • Smoking status: Former Smoker     Packs/day: 0.50     Years: 10.00     Pack years: 5.00     Types: Cigarettes     Last attempt to quit: 2015     Years since quittin.6   • Smokeless tobacco: Never Used   Substance and Sexual Activity   • Alcohol use: Yes     Comment: social   • Drug use: Never   • Sexual activity: Defer       Past Surgical History:   Procedure Laterality Date   • CARDIAC CATHETERIZATION     • COLONOSCOPY     • CORONARY STENT PLACEMENT  2015    x1 stent [place] facility   • EXTRACORPOREAL SHOCK WAVE LITHOTRIPSY (ESWL) Right 2020    Procedure: EXTRACORPOREAL SHOCKWAVE LITHOTRIPSY;  Surgeon: Pablo Crenshaw MD;  Location: [facility];  Service: Urology;  Laterality: Right;   • FRACTURE SURGERY Right     orif   •  "ORIF FIBULA FRACTURE Right 12/17/2019    Procedure: OPEN REDUCTION INTERNAL FIXATION RIGHT DISTAL FIBULA;  Surgeon: Christian Brooks MD;  Location: [facility];  Service: Orthopedics       Review of Systems   Constitutional: Negative for activity change, appetite change, chills and fever.   HENT: Negative for sore throat and trouble swallowing.    Eyes: Negative for visual disturbance.   Respiratory: Negative for cough and shortness of breath.    Cardiovascular: Negative for chest pain and palpitations.   Gastrointestinal: Positive for abdominal pain. Negative for abdominal distention, blood in stool, constipation, diarrhea, nausea and vomiting.   Endocrine: Negative for cold intolerance and heat intolerance.   Genitourinary: Negative for dysuria.   Musculoskeletal: Negative for joint swelling.   Skin: Negative for color change, rash and wound.   Allergic/Immunologic: Negative for immunocompromised state.   Neurological: Negative for dizziness, seizures, weakness and headaches.   Hematological: Negative for adenopathy. Does not bruise/bleed easily.   Psychiatric/Behavioral: Negative for agitation and confusion.         /92   Ht 170.2 cm (67.01\")   Wt 108 kg (238 lb)   BMI 37.27 kg/m²   Objective   Physical Exam   Constitutional: He is oriented to person, place, and time. He appears well-developed.   HENT:   Head: Normocephalic and atraumatic.   Mouth/Throat: Mucous membranes are normal.   Eyes: Pupils are equal, round, and reactive to light. Conjunctivae are normal.   Neck: Neck supple. No JVD present. No tracheal deviation present. No thyromegaly present.   Cardiovascular: Normal rate and regular rhythm. Exam reveals no gallop and no friction rub.   No murmur heard.  Pulmonary/Chest: Effort normal and breath sounds normal.   Abdominal: Soft. He exhibits no distension. There is no splenomegaly or hepatomegaly. There is no tenderness. A hernia is present. Hernia confirmed positive in the ventral area. "   Incarcerated fat-containing umbilical hernia   Musculoskeletal: Normal range of motion. He exhibits no deformity.   Neurological: He is alert and oriented to person, place, and time.   Skin: Skin is warm and dry.   Psychiatric: He has a normal mood and affect.             Assessment   Austin was seen today for hernia.    Diagnoses and all orders for this visit:    Umbilical hernia without obstruction and without gangrene  -     Case Request; Standing  -     CBC & Differential; Future  -     Comprehensive Metabolic Panel; Future  -     Case Request    Other orders  -     Follow Anesthesia Guidelines / Standing Orders; Future  -     Provide NPO Instructions to Patient; Future  -     Chlorhexidine Skin Prep; Future      Austin Naren is a 49 y.o. male with painful fat containing incarcerated umbilical hernia.  He will hold Plavix 5 days prior to surgery and undergo robotic umbilical hernia repair with mesh.  He is aware of the risks and benefits of the procedure.  He will continue aspirin throughout the perioperative period.    Patient's Body mass index is 37.27 kg/m². BMI is above normal parameters. Recommendations include: educational material.

## 2020-09-10 ENCOUNTER — PRIOR AUTHORIZATION (OUTPATIENT)
Dept: UROLOGY | Facility: CLINIC | Age: 50
End: 2020-09-10

## 2020-09-10 DIAGNOSIS — Z01.818 PREOP TESTING: Primary | ICD-10-CM

## 2020-09-29 ENCOUNTER — APPOINTMENT (OUTPATIENT)
Dept: PREADMISSION TESTING | Facility: HOSPITAL | Age: 50
End: 2020-09-29

## 2020-09-29 ENCOUNTER — LAB (OUTPATIENT)
Dept: LAB | Facility: HOSPITAL | Age: 50
End: 2020-09-29

## 2020-09-29 DIAGNOSIS — Z01.818 PREOP TESTING: ICD-10-CM

## 2020-10-07 ENCOUNTER — HOSPITAL ENCOUNTER (OUTPATIENT)
Dept: GENERAL RADIOLOGY | Facility: HOSPITAL | Age: 50
Discharge: HOME | End: 2020-10-07
Admitting: NURSE PRACTITIONER

## 2020-10-07 ENCOUNTER — OFFICE VISIT (OUTPATIENT)
Dept: UROLOGY | Facility: CLINIC | Age: 50
End: 2020-10-07

## 2020-10-07 VITALS — HEIGHT: 67 IN | TEMPERATURE: 98.2 F | WEIGHT: 243 LBS | BODY MASS INDEX: 38.14 KG/M2

## 2020-10-07 DIAGNOSIS — N20.0 RIGHT NEPHROLITHIASIS: Primary | ICD-10-CM

## 2020-10-07 PROCEDURE — 74018 RADEX ABDOMEN 1 VIEW: CPT

## 2020-10-07 PROCEDURE — 99024 POSTOP FOLLOW-UP VISIT: CPT | Performed by: NURSE PRACTITIONER

## 2020-10-07 PROCEDURE — 74018 RADEX ABDOMEN 1 VIEW: CPT | Performed by: RADIOLOGY

## 2020-10-07 RX ORDER — ETODOLAC 400 MG/1
1 TABLET, EXTENDED RELEASE ORAL DAILY
COMMUNITY
Start: 2020-09-13 | End: 2020-12-02

## 2020-10-07 RX ORDER — SEMAGLUTIDE 1.34 MG/ML
INJECTION, SOLUTION SUBCUTANEOUS WEEKLY
COMMUNITY
Start: 2020-09-04

## 2020-10-07 RX ORDER — METOPROLOL SUCCINATE 50 MG/1
1 TABLET, EXTENDED RELEASE ORAL DAILY
COMMUNITY
Start: 2020-09-20

## 2020-10-07 NOTE — PROGRESS NOTES
Chief Complaint:          Chief Complaint   Patient presents with   • Right Nephrolithiasis     6 wk F/U POST ESWL       HPI:   49 y.o. male.  Very Pleasant Patient is seen for follow up today. He reports doing relatively well post procedure ESWL on 08/21/2020 for a right 12mm UPJ stone by Dr. Crenshaw. He denies having any post opt complications such as fevers, chills, bleeding, hematoma,  and has had no discomfort what soever.  He reports passing adequate fragmentations, and feels great today.    Patient denies having anymore flank pain or discomfort post surgery.  He is in no apparent distress during exam today. He also reports his urine symptoms of frequency that was very persistent at night only( reported nocturia 5-6 times), have completely resolved. He denies dysuria, urgency, or burning on urination.  He denies any episodes of gross hematuria.  Denies N/V/D, no chills or fevers, denies pelvic pressure discomfort, he denies bilateral flank pain or CVA tenderness.     He is unable to give us a urine sample today, his IPSS score is 6.  He has a significant past medical history as listed below.    Repeat a KUB       Past Medical History:        Past Medical History:   Diagnosis Date   • Arthritis    • Coronary artery disease    • Diabetes mellitus (CMS/[facility])    • Elevated cholesterol    • Fracture    • GERD (gastroesophageal reflux disease)    • Hyperlipidemia    • Hypertension    • Kidney stones    • Sleep apnea     no c-pap       The following portions of the patient's history were reviewed and updated as appropriate: allergies, current medications, past family history, past medical history, past social history, past surgical history and problem list.    Current Meds:     Current Outpatient Medications   Medication Sig Dispense Refill   • aspirin 81 MG EC tablet Take 243 mg by mouth Daily. Takes 3 tabs per dose     • atorvastatin (LIPITOR) 20 MG tablet Take 20 mg by mouth Daily.     • Blood Glucose Monitoring  Suppl (TRUE METRIX METER) w/Device kit Use as directed to test Blood Sugar. (Patient taking differently: States does not check sugar at home) 1 kit 0   • clopidogrel (PLAVIX) 75 MG tablet Take 75 mg by mouth Daily. LAST DOSE 12/12/19. PER DR TEE     • etodolac XL (LODINE XL) 400 MG 24 hr tablet Take 1 tablet by mouth Daily.     • glucose blood test strip Use as directed to test Blood Sugar 2 times a day. (Patient taking differently: Use as directed to test Blood Sugar 2 times a day.    States does not check sugar at home) 50 each 0   • metFORMIN ER (GLUCOPHAGE-XR) 500 MG 24 hr tablet Take 1,000 mg by mouth 2 (two) times a day.     • metoprolol succinate XL (TOPROL-XL) 50 MG 24 hr tablet Take 1 tablet by mouth Daily.     • naproxen (NAPROSYN) 500 MG tablet Take 500 mg by mouth Daily.     • Ozempic, 0.25 or 0.5 MG/DOSE, 2 MG/1.5ML solution pen-injector Inject  under the skin into the appropriate area as directed 1 (One) Time Per Week.     • TRUEPLUS LANCETS 28G misc Use as Directed to test Blood Sugar 2 times a day (Patient taking differently: States does not check sugar at home) 50 each 0     No current facility-administered medications for this visit.         Allergies:      Allergies   Allergen Reactions   • Penicillins Hives        Past Surgical History:     Past Surgical History:   Procedure Laterality Date   • CARDIAC CATHETERIZATION     • COLONOSCOPY     • CORONARY STENT PLACEMENT  2015    x1 stent [place] facility   • EXTRACORPOREAL SHOCK WAVE LITHOTRIPSY (ESWL) Right 8/21/2020    Procedure: EXTRACORPOREAL SHOCKWAVE LITHOTRIPSY;  Surgeon: Pablo Crenshaw MD;  Location: [facility];  Service: Urology;  Laterality: Right;   • FRACTURE SURGERY Right     orif   • ORIF FIBULA FRACTURE Right 12/17/2019    Procedure: OPEN REDUCTION INTERNAL FIXATION RIGHT DISTAL FIBULA;  Surgeon: Christian Brooks MD;  Location: [facility];  Service: Orthopedics         Social History:     Social History     Socioeconomic  History   • Marital status:      Spouse name: Not on file   • Number of children: Not on file   • Years of education: Not on file   • Highest education level: Not on file   Tobacco Use   • Smoking status: Former Smoker     Packs/day: 0.50     Years: 10.00     Pack years: 5.00     Types: Cigarettes     Quit date:      Years since quittin.7   • Smokeless tobacco: Never Used   Substance and Sexual Activity   • Alcohol use: Yes     Comment: social   • Drug use: Never   • Sexual activity: Defer       Family History:     Family History   Problem Relation Age of Onset   • Heart disease Father    • Heart attack Maternal Grandmother        Review of Systems:     Review of Systems   Constitutional: Positive for fatigue. Negative for activity change, appetite change, chills and fever.   HENT: Negative for congestion and sinus pressure.    Eyes: Negative for blurred vision and double vision.   Respiratory: Negative for cough, shortness of breath and wheezing.    Cardiovascular: Negative for leg swelling.   Gastrointestinal: Positive for abdominal distention. Negative for abdominal pain, constipation, diarrhea, nausea and vomiting.   Genitourinary: Negative for difficulty urinating, discharge, dysuria, flank pain, frequency, genital sores, hematuria, penile pain, penile swelling, scrotal swelling, testicular pain, urgency and urinary incontinence.   Musculoskeletal: Negative for back pain.   Neurological: Negative for dizziness, weakness, headache and confusion.   Psychiatric/Behavioral: Positive for stress. Negative for agitation, behavioral problems and decreased concentration. The patient is not nervous/anxious.       IPSS Questionnaire (AUA-7):  Over the past month…    1)  How often have you had a sensation of not emptying your bladder completely after you finish urinating?  0 - Not at all   2)  How often have you had to urinate again less than two hours after you finished urinating? 2 - Less than half the  time   3)  How often have you found you stopped and started again several times when you urinated?  0 - Not at all   4) How difficult have you found it to postpone urination?  2 - Less than half the time   5) How often have you had a weak urinary stream?  0 - Not at all   6) How often have you had to push or strain to begin urination?  0 - Not at all   7) How many times did you most typically get up to urinate from the time you went to bed until the time you got up in the morning?  2 - 2 times   Total score:  0-7 mildly symptomatic                                                          6    8-19 moderately symptomatic    20-35 severely symptomatic       Physical Exam:     Physical Exam  Constitutional:       General: He is not in acute distress.     Appearance: He is well-developed.   HENT:      Head: Normocephalic and atraumatic.      Right Ear: External ear normal.      Left Ear: External ear normal.   Eyes:      General:         Right eye: No discharge.         Left eye: No discharge.      Conjunctiva/sclera: Conjunctivae normal.      Pupils: Pupils are equal, round, and reactive to light.   Neck:      Musculoskeletal: Normal range of motion and neck supple.      Thyroid: No thyromegaly.      Trachea: No tracheal deviation.   Cardiovascular:      Rate and Rhythm: Normal rate and regular rhythm.      Heart sounds: No murmur. No friction rub.   Pulmonary:      Effort: Pulmonary effort is normal. No respiratory distress.      Breath sounds: Normal breath sounds. No stridor.   Abdominal:      General: Bowel sounds are normal. There is no distension.      Palpations: Abdomen is soft.      Tenderness: There is abdominal tenderness. There is no guarding.   Genitourinary:     Penis: Normal and uncircumcised. No tenderness or discharge.       Scrotum/Testes: Normal.      Rectum: Normal. Guaiac result negative.   Musculoskeletal: Normal range of motion.         General: Tenderness present. No deformity.   Skin:      General: Skin is warm and dry.      Capillary Refill: Capillary refill takes less than 2 seconds.      Coloration: Skin is not pale.   Neurological:      Mental Status: He is alert and oriented to person, place, and time.      Cranial Nerves: No cranial nerve deficit.      Coordination: Coordination normal.   Psychiatric:         Behavior: Behavior normal.         Thought Content: Thought content normal.         Judgment: Judgment normal.       Procedure:       Assessment/Plan:     Right  Nephrolithiasis: Patient is seen for follow up today. He reports doing relatively well post procedure ESWL on 08/21/2020 for a Right 12 mm  ureteral  stone. He denies having any post opt complications such as fevers, chills, bleeding, hematoma,  and has had no discomfort what soever.  He denies having any bothersome urinary symptoms, his IPSS score is 6.    We both reviewed/discussed his KUB results today which show No calcifications were demonstratable over the left kidney.  There are several calcifications on the right. The largest calcification is in a position it could be in the renal pelvis. It measures approximately a centimeter in diameter. There were no calcifications along the course of the ureters.    Right Renal Calculus: The Patient has been diagnosed with a 1 CM right renal calculus.  He reports that he has been completely asymptomatic.  We have discussed the various parameters regarding spontaneous passage including the notion that a 2-3 mm stone has a high likelihood of spontaneous passage versus a larger stone of > 10mm like he has  being caught up in the upper areas of the urinary tract.      We discussed the presence of the stone. We discussed the various therapeutic options available including percutaneous nephrostolithotomy, lithotripsy.  We discussed the risks of lithotripsy including the passage of stones the development of a large string of stones in the distal ureter known as Steinstrasse.  In the 3%  incidence of that we will need to proceed with a ureteroscopy for obstructing fragments.  Extremely rare incidence of renal hematoma, and the significance of this. We discussed the absolute relative indicators for intervention including the presence of sepsis, and pain we cannot control as the primary need for urgent intervention.  We discussed placement of a stent if indicated and the management of the stent as well.      ESWL-the patient is a candidate for extracorporeal shockwave lithotripsy.  We discussed the type of stone.  And the complications associated with the procedure including but not limited to pain in the flank, hematoma, spontaneous renal hemorrhage, and adequate fragmentation of stones.  The need for passage of the stones.  The need for concomitant additional procedures in the range of 24% the risk of a distal fragment in the range of 3% requiring ureteroscopic     I Discussed this case with Dr Shi who is very familiar with this patient. Patient reports he is asymptomatic and would like to wait. He does not want a repeat procedure at this time.  He is a  and reports a busy schedule recently.    We will follow-up in 6 months or sooner if he has any right flank pain or discomfort and Repeat a KUB.    We will also recheck his PSA and Prostate Exam at that time.    Patient is agreeable plan of care.    Patient reports that he is not currently experiencing any symptoms of urinary incontinence.    Patient's Body mass index is 38.05 kg/m². BMI is above normal parameters. Recommendations include: educational material, exercise counseling and nutrition counseling.    Smoking Cessation Counseling:  Former smoker. QUIT 2015  Patient does not currently use any tobacco products.     Counseling was given to patient for the following topics diagnostic results including: Right Nephrolithiasis, instructions for management as follows: Increase ambulation, increase activity, warm compresses to flank  area if discomfort, hot baths hot showers, increase p.o. fluid intake 2 to 3 L daily. and risk factor reductions including: High oxalate foods, high sodium/high calcium products, avoiding bladder irritants such as caffeine products, soda drinks, citrusy/spicy foods.. The interim medical history and current results were reviewed.  A treatment plan with follow-up was made for Right nephrolithiasis [N20.0]             This document has been electronically signed by Griselda Cheng-Akwa, APRN October 7, 2020 16:36 EDT

## 2020-10-07 NOTE — PATIENT INSTRUCTIONS
"Dietary Guidelines to Help Prevent Kidney Stones  Kidney stones are deposits of minerals and salts that form inside your kidneys. Your risk of developing kidney stones may be greater depending on your diet, your lifestyle, the medicines you take, and whether you have certain medical conditions. Most people can reduce their chances of developing kidney stones by following the instructions below. Depending on your overall health and the type of kidney stones you tend to develop, your dietitian may give you more specific instructions.  What are tips for following this plan?  Reading food labels  · Choose foods with \"no salt added\" or \"low-salt\" labels. Limit your sodium intake to less than 1500 mg per day.  · Choose foods with calcium for each meal and snack. Try to eat about 300 mg of calcium at each meal. Foods that contain 200-500 mg of calcium per serving include:  ? 8 oz (237 ml) of milk, fortified nondairy milk, and fortified fruit juice.  ? 8 oz (237 ml) of kefir, yogurt, and soy yogurt.  ? 4 oz (118 ml) of tofu.  ? 1 oz of cheese.  ? 1 cup (300 g) of dried figs.  ? 1 cup (91 g) of cooked broccoli.  ? 1-3 oz can of sardines or mackerel.  · Most people need 1000 to 1500 mg of calcium each day. Talk to your dietitian about how much calcium is recommended for you.  Shopping  · Buy plenty of fresh fruits and vegetables. Most people do not need to avoid fruits and vegetables, even if they contain nutrients that may contribute to kidney stones.  · When shopping for convenience foods, choose:  ? Whole pieces of fruit.  ? Premade salads with dressing on the side.  ? Low-fat fruit and yogurt smoothies.  · Avoid buying frozen meals or prepared deli foods.  · Look for foods with live cultures, such as yogurt and kefir.  Cooking  · Do not add salt to food when cooking. Place a salt shaker on the table and allow each person to add his or her own salt to taste.  · Use vegetable protein, such as beans, textured vegetable " protein (TVP), or tofu instead of meat in pasta, casseroles, and soups.  Meal planning    · Eat less salt, if told by your dietitian. To do this:  ? Avoid eating processed or premade food.  ? Avoid eating fast food.  · Eat less animal protein, including cheese, meat, poultry, or fish, if told by your dietitian. To do this:  ? Limit the number of times you have meat, poultry, fish, or cheese each week. Eat a diet free of meat at least 2 days a week.  ? Eat only one serving each day of meat, poultry, fish, or seafood.  ? When you prepare animal protein, cut pieces into small portion sizes. For most meat and fish, one serving is about the size of one deck of cards.  · Eat at least 5 servings of fresh fruits and vegetables each day. To do this:  ? Keep fruits and vegetables on hand for snacks.  ? Eat 1 piece of fruit or a handful of berries with breakfast.  ? Have a salad and fruit at lunch.  ? Have two kinds of vegetables at dinner.  · Limit foods that are high in a substance called oxalate. These include:  ? Spinach.  ? Rhubarb.  ? Beets.  ? Potato chips and french fries.  ? Nuts.  · If you regularly take a diuretic medicine, make sure to eat at least 1-2 fruits or vegetables high in potassium each day. These include:  ? Avocado.  ? Banana.  ? Orange, prune, carrot, or tomato juice.  ? Baked potato.  ? Cabbage.  ? Beans and split peas.  General instructions    · Drink enough fluid to keep your urine clear or pale yellow. This is the most important thing you can do.  · Talk to your health care provider and dietitian about taking daily supplements. Depending on your health and the cause of your kidney stones, you may be advised:  ? Not to take supplements with vitamin C.  ? To take a calcium supplement.  ? To take a daily probiotic supplement.  ? To take other supplements such as magnesium, fish oil, or vitamin B6.  · Take all medicines and supplements as told by your health care provider.  · Limit alcohol intake to no  more than 1 drink a day for nonpregnant women and 2 drinks a day for men. One drink equals 12 oz of beer, 5 oz of wine, or 1½ oz of hard liquor.  · Lose weight if told by your health care provider. Work with your dietitian to find strategies and an eating plan that works best for you.  What foods are not recommended?  Limit your intake of the following foods, or as told by your dietitian. Talk to your dietitian about specific foods you should avoid based on the type of kidney stones and your overall health.  Grains  Breads. Bagels. Rolls. Baked goods. Salted crackers. Cereal. Pasta.  Vegetables  Spinach. Rhubarb. Beets. Canned vegetables. Pickles. Olives.  Meats and other protein foods  Nuts. Nut butters. Large portions of meat, poultry, or fish. Salted or cured meats. Deli meats. Hot dogs. Sausages.  Dairy  Cheese.  Beverages  Regular soft drinks. Regular vegetable juice.  Seasonings and other foods  Seasoning blends with salt. Salad dressings. Canned soups. Soy sauce. Ketchup. Barbecue sauce. Canned pasta sauce. Casseroles. Pizza. Lasagna. Frozen meals. Potato chips. French fries.  Summary  · You can reduce your risk of kidney stones by making changes to your diet.  · The most important thing you can do is drink enough fluid. You should drink enough fluid to keep your urine clear or pale yellow.  · Ask your health care provider or dietitian how much protein from animal sources you should eat each day, and also how much salt and calcium you should have each day.  This information is not intended to replace advice given to you by your health care provider. Make sure you discuss any questions you have with your health care provider.  Document Released: 04/13/2012 Document Revised: 04/08/2020 Document Reviewed: 11/28/2017  [employer] Patient Education © 2020 [employer]    Discussed a kidney stone prevention diet to include increasing p.o. fluid intake, to at least 1 to 2 L of water daily.  She is to avoid caffeine  products such as cola, coffee, and to avoid soft or soda drinks.  She is to decrease her sodium consumption as in  Fast foods, torres, salted nuts, canned foods, and smoked/cured foods. She is also to decrease her oxalate consumption, as in spinach, Nery greens, and Rhubarb.  Also important is to decrease protein intake, as in red meats, peanut butter, and also avoid nuts.

## 2020-10-23 ENCOUNTER — HOSPITAL ENCOUNTER (EMERGENCY)
Facility: HOSPITAL | Age: 50
Discharge: LEFT BEFORE DISCHARGE | End: 2020-10-23

## 2020-10-23 VITALS
HEART RATE: 96 BPM | TEMPERATURE: 98.9 F | SYSTOLIC BLOOD PRESSURE: 126 MMHG | OXYGEN SATURATION: 96 % | RESPIRATION RATE: 18 BRPM | BODY MASS INDEX: 37.04 KG/M2 | DIASTOLIC BLOOD PRESSURE: 97 MMHG | WEIGHT: 236 LBS | HEIGHT: 67 IN

## 2020-11-20 ENCOUNTER — OFFICE VISIT (OUTPATIENT)
Dept: UROLOGY | Facility: CLINIC | Age: 50
End: 2020-11-20

## 2020-11-20 VITALS — HEIGHT: 67 IN | WEIGHT: 241 LBS | TEMPERATURE: 97.8 F | BODY MASS INDEX: 37.83 KG/M2

## 2020-11-20 DIAGNOSIS — R31.0 GROSS HEMATURIA: ICD-10-CM

## 2020-11-20 DIAGNOSIS — R35.0 URINE FREQUENCY: ICD-10-CM

## 2020-11-20 DIAGNOSIS — N20.0 RENAL CALCULUS, RIGHT: Primary | ICD-10-CM

## 2020-11-20 DIAGNOSIS — R10.9 RIGHT FLANK DISCOMFORT: ICD-10-CM

## 2020-11-20 LAB
BILIRUB BLD-MCNC: NEGATIVE MG/DL
CLARITY, POC: ABNORMAL
COLOR UR: ABNORMAL
GLUCOSE UR STRIP-MCNC: NEGATIVE MG/DL
KETONES UR QL: NEGATIVE
LEUKOCYTE EST, POC: NEGATIVE
NITRITE UR-MCNC: NEGATIVE MG/ML
PH UR: 6.5 [PH] (ref 5–8)
PROT UR STRIP-MCNC: NEGATIVE MG/DL
RBC # UR STRIP: ABNORMAL /UL
SP GR UR: 1.02 (ref 1–1.03)
UROBILINOGEN UR QL: NORMAL

## 2020-11-20 PROCEDURE — 87086 URINE CULTURE/COLONY COUNT: CPT | Performed by: NURSE PRACTITIONER

## 2020-11-20 PROCEDURE — 99024 POSTOP FOLLOW-UP VISIT: CPT | Performed by: NURSE PRACTITIONER

## 2020-11-20 RX ORDER — GENTAMICIN SULFATE 80 MG/100ML
80 INJECTION, SOLUTION INTRAVENOUS ONCE
Status: CANCELLED | OUTPATIENT
Start: 2020-12-04 | End: 2020-11-20

## 2020-11-20 RX ORDER — HYDROCODONE BITARTRATE AND ACETAMINOPHEN 10; 325 MG/1; MG/1
1 TABLET ORAL EVERY 6 HOURS PRN
Qty: 12 TABLET | Refills: 0 | Status: SHIPPED | OUTPATIENT
Start: 2020-11-20 | End: 2020-12-02

## 2020-11-20 NOTE — PROGRESS NOTES
Chief Complaint:          Chief Complaint   Patient presents with   • Blood in Urine /Right Nephrolithiasis     Post Opt ESWL 08/20/20       HPI:   49 y.o. male.returns for follow up today, He has concerns of gross hematuria and lower back pain ongoing x 1 week. Patient states this is becoming more bothersome to him.     He reports doing relatively well post procedure ESWL on 08/21/2020 for a right 12mm UPJ stone by Dr. Crenshaw. He denies having any post opt complications such as fevers, chills, bleeding, hematoma,  and has had no discomfort what soever.  He reports passing adequate fragmentations, and felt great and has been working daily as a   until recently he has had bilateral flank pain Right side > Left Side.    He also reports urine frequency, urgency, Nocturia and gross hematuria.He denies dysuria, any burning on  Urination, denies pelvic/suprapubic discomfort.He has had Nausea and chills, Denies fevers, V/D.  His Urine dipstick is negative for any infection, He has 3 +micro/Gross Hematuria.    Repeat a KUB.      Past Medical History:        Past Medical History:   Diagnosis Date   • Arthritis    • Coronary artery disease    • Diabetes mellitus (CMS/[facility])    • Elevated cholesterol    • Fracture    • GERD (gastroesophageal reflux disease)    • Hyperlipidemia    • Hypertension    • Kidney stones    • Sleep apnea     no c-pap         The following portions of the patient's history were reviewed and updated as appropriate: allergies, current medications, past family history, past medical history, past social history, past surgical history and problem list.    Current Meds:     Current Outpatient Medications   Medication Sig Dispense Refill   • aspirin 81 MG EC tablet Take 243 mg by mouth Daily. Takes 3 tabs per dose     • atorvastatin (LIPITOR) 20 MG tablet Take 20 mg by mouth Daily.     • Blood Glucose Monitoring Suppl (TRUE METRIX METER) w/Device kit Use as directed to test Blood Sugar. (Patient  taking differently: States does not check sugar at home) 1 kit 0   • clopidogrel (PLAVIX) 75 MG tablet Take 75 mg by mouth Daily. LAST DOSE 12/12/19. PER DR TEE     • etodolac XL (LODINE XL) 400 MG 24 hr tablet Take 1 tablet by mouth Daily.     • glucose blood test strip Use as directed to test Blood Sugar 2 times a day. (Patient taking differently: Use as directed to test Blood Sugar 2 times a day.    States does not check sugar at home) 50 each 0   • metFORMIN ER (GLUCOPHAGE-XR) 500 MG 24 hr tablet Take 1,000 mg by mouth 2 (two) times a day.     • metoprolol succinate XL (TOPROL-XL) 50 MG 24 hr tablet Take 1 tablet by mouth Daily.     • naproxen (NAPROSYN) 500 MG tablet Take 500 mg by mouth Daily.     • Ozempic, 0.25 or 0.5 MG/DOSE, 2 MG/1.5ML solution pen-injector Inject  under the skin into the appropriate area as directed 1 (One) Time Per Week.     • TRUEPLUS LANCETS 28G misc Use as Directed to test Blood Sugar 2 times a day (Patient taking differently: States does not check sugar at home) 50 each 0   • HYDROcodone-acetaminophen (Norco)  MG per tablet Take 1 tablet by mouth Every 6 (Six) Hours As Needed for Moderate Pain . 12 tablet 0     No current facility-administered medications for this visit.         Allergies:      Allergies   Allergen Reactions   • Penicillins Hives        Past Surgical History:     Past Surgical History:   Procedure Laterality Date   • CARDIAC CATHETERIZATION     • COLONOSCOPY     • CORONARY STENT PLACEMENT  2015    x1 stent [place] facility   • EXTRACORPOREAL SHOCK WAVE LITHOTRIPSY (ESWL) Right 8/21/2020    Procedure: EXTRACORPOREAL SHOCKWAVE LITHOTRIPSY;  Surgeon: Pablo Crenshaw MD;  Location: [facility];  Service: Urology;  Laterality: Right;   • FRACTURE SURGERY Right     orif   • ORIF FIBULA FRACTURE Right 12/17/2019    Procedure: OPEN REDUCTION INTERNAL FIXATION RIGHT DISTAL FIBULA;  Surgeon: Christian Brooks MD;  Location: [facility];  Service: Orthopedics              Social History:     Social History     Socioeconomic History   • Marital status:      Spouse name: Not on file   • Number of children: Not on file   • Years of education: Not on file   • Highest education level: Not on file   Tobacco Use   • Smoking status: Former Smoker     Packs/day: 0.50     Years: 10.00     Pack years: 5.00     Types: Cigarettes     Quit date:      Years since quittin.9   • Smokeless tobacco: Never Used   Substance and Sexual Activity   • Alcohol use: Yes     Comment: social   • Drug use: Never   • Sexual activity: Defer       Family History:     Family History   Problem Relation Age of Onset   • Heart disease Father    • Heart attack Maternal Grandmother        Review of Systems:     Review of Systems   Constitutional: Positive for activity change, chills and fatigue. Negative for appetite change and fever.   HENT: Negative for congestion and sinus pressure.    Eyes: Negative for blurred vision and double vision.   Respiratory: Negative for cough, shortness of breath and wheezing.    Cardiovascular: Negative for leg swelling.   Gastrointestinal: Positive for abdominal distention, abdominal pain and nausea. Negative for constipation, diarrhea and vomiting.   Genitourinary: Positive for flank pain, frequency, hematuria, nocturia and urgency. Negative for difficulty urinating, discharge, dysuria, genital sores, penile pain, penile swelling, scrotal swelling, testicular pain and urinary incontinence.   Musculoskeletal: Positive for back pain.   Skin: Positive for pallor.   Neurological: Negative for dizziness, weakness, headache and confusion.   Psychiatric/Behavioral: Positive for sleep disturbance and stress. Negative for agitation, behavioral problems and decreased concentration. The patient is not nervous/anxious.         Physical Exam:     Physical Exam  Constitutional:       General: He is not in acute distress.     Appearance: He is well-developed. He is obese. He is  ill-appearing.   HENT:      Head: Normocephalic and atraumatic.      Right Ear: External ear normal.      Left Ear: External ear normal.   Eyes:      General:         Right eye: No discharge.         Left eye: No discharge.      Conjunctiva/sclera: Conjunctivae normal.      Pupils: Pupils are equal, round, and reactive to light.   Neck:      Musculoskeletal: Normal range of motion and neck supple.      Thyroid: No thyromegaly.      Trachea: No tracheal deviation.   Cardiovascular:      Rate and Rhythm: Normal rate and regular rhythm.      Heart sounds: No murmur. No friction rub.   Pulmonary:      Effort: Pulmonary effort is normal. No respiratory distress.      Breath sounds: Normal breath sounds. No stridor.   Abdominal:      General: Bowel sounds are normal. There is distension.      Palpations: Abdomen is soft.      Tenderness: There is abdominal tenderness. There is no guarding.   Genitourinary:     Penis: Normal and uncircumcised. No tenderness or discharge.       Scrotum/Testes: Normal.      Rectum: Normal. Guaiac result negative.   Musculoskeletal: Normal range of motion.         General: Tenderness present. No deformity.   Skin:     General: Skin is warm and dry.      Capillary Refill: Capillary refill takes less than 2 seconds.      Coloration: Skin is not pale.   Neurological:      Mental Status: He is alert and oriented to person, place, and time.      Cranial Nerves: No cranial nerve deficit.      Motor: Weakness present.      Coordination: Coordination normal.   Psychiatric:         Behavior: Behavior normal.         Thought Content: Thought content normal.         Judgment: Judgment normal.       Procedure:       Assessment/Plan:          ASSESSMENT  Right  Nephrolithiasis/Hematuria: Patient is seen for 3 month follow up today. He reports doing relatively well post procedure ESWL on 08/21/2020 for a Right 12 mm  ureteral  stone. He denies having any post opt complications such as fevers, chills,  bleeding, hematoma,  and has had no discomfort what soever until recently he reports lower back pain, flank pain R>L side and episodes of gross hematuria, with  bothersome urinary symptoms. His urine dipstick is however negative for any infection, he has 3+ microscopic and gross hematuria.    We both reviewed/discussed his last KUB results today which show that :There are several calcifications on the right. The largest calcification is in a position it could be in the renal pelvis. It measures approximately 1 centimeter in diameter. There were no calcifications along the course of the ureters, there are  No calcifications were demonstratable over the left kidney.    Right Renal Calculus: The Patient has been diagnosed with a 1 CM right renal calculus.  He reports that he has been completely asymptomatic until one week ago.  We have discussed the various parameters regarding spontaneous passage including the notion that a 2-3 mm stone has a high likelihood of spontaneous passage versus a larger stone of > 10mm like he has  being caught up in the upper areas of the urinary tract.       We discussed the absolute relative indicators for intervention including the presence of sepsis, and pain we cannot control as the primary need for urgent intervention.  We discussed placement of a stent if indicated and the management of the stent as well. Patient is desirous of a surgical intervention at this time.               PLAN  I Discussed this case with Dr Shi who is very familiar with this patient, and is agreeable with the plan of care.     The patient has been scheduled for a Right Extracoporeal Shockwave Lithotripsy (ESWL) on 12/04/20 with Dr. Crenshaw.    He has been given Adequate Pain medications Hydrocodone 10/325, just in case, and flomax and nausea meds to use PRN.    He may alternate NSAIDS/Tylenol PRN for break through pain.    Narcotic pain medication-patient has significant acute pain that I believe would be  an indication for the use of narcotic pain medication.  I discussed the significant risks of pain medication and the fact that this will be a short only option and I will give her no more than a three-day supply of pain medication.  And I will not plan long-term medication and that this will be sent to a pain clinic if at all becomes necessary.      We discussed signing a pain medication agreement and the fact that we're going to run a state KIM review to be sure the patient is not getting pain medication from elsewhere.  If this is the case we will not give pain medication.  As part of the patient's treatment plan of there being prescribed a controlled substance with potential for abuse.    This patient has been Made aware of the appropriate dose of such medications including, the risk for somnolence, limited ability to drive and/or safety and the significant potential for overdose.  It is been made clear that these medications are for the prescribed patient only without concomitant use of alcohol or other sepsis unless prescribed by the medical provider. Has completed prescribing agreement detailing the terms of continue prescribing him a controlled substance.  Including monitoring Kim ports, the possibility of urine drug screens and pedal counts.  The patient is aware that we reviewed KIM reports on a regular basis and scan them into the chart.  History and physical examination exhibited continued safe and appropriate use of controlled substances.    Also discussed the fact that the new [place] legislation allows only a three-day prescription for pain medication.  In this situation he will be referred to a chronic pain clinic.    Patient is agreeable plan of care.     Patient reports that he is not currently experiencing any symptoms of urinary incontinence.    Patient's Body mass index is 37.73 kg/m². BMI is above normal parameters. Recommendations include: educational material, exercise counseling and  nutrition counseling.    Smoking Cessation Counseling:  Former smoker.QUIT 2016  Patient does not currently use any tobacco products.      Counseling was given to patient for the following topics diagnostic results including: Right Nephrolithiasis, instructions for management as follows: Increase ambulation, increase activity, warm compresses to flank area if discomfort, hot baths hot showers, increase p.o. fluid intake 2 to 3 L daily. and risk factor reductions including: High oxalate foods, high sodium/high calcium products, avoiding bladder irritants such as caffeine products, soda drinks, citrusy/spicy foods.. The interim medical history and current results were reviewed.  A treatment plan with follow-up was made for Right nephrolithiasis [N20.0]         This document has been electronically signed by Griselda Cheng-Akwa, APRN November 28, 2020 04:00 EST

## 2020-11-21 LAB — BACTERIA SPEC AEROBE CULT: NO GROWTH

## 2020-11-23 DIAGNOSIS — N20.0 RENAL CALCULUS, RIGHT: Primary | ICD-10-CM

## 2020-11-28 NOTE — PATIENT INSTRUCTIONS
"Dietary Guidelines to Help Prevent Kidney Stones  Kidney stones are deposits of minerals and salts that form inside your kidneys. Your risk of developing kidney stones may be greater depending on your diet, your lifestyle, the medicines you take, and whether you have certain medical conditions. Most people can reduce their chances of developing kidney stones by following the instructions below. Depending on your overall health and the type of kidney stones you tend to develop, your dietitian may give you more specific instructions.  What are tips for following this plan?  Reading food labels  · Choose foods with \"no salt added\" or \"low-salt\" labels. Limit your sodium intake to less than 1500 mg per day.  · Choose foods with calcium for each meal and snack. Try to eat about 300 mg of calcium at each meal. Foods that contain 200-500 mg of calcium per serving include:  ? 8 oz (237 ml) of milk, fortified nondairy milk, and fortified fruit juice.  ? 8 oz (237 ml) of kefir, yogurt, and soy yogurt.  ? 4 oz (118 ml) of tofu.  ? 1 oz of cheese.  ? 1 cup (300 g) of dried figs.  ? 1 cup (91 g) of cooked broccoli.  ? 1-3 oz can of sardines or mackerel.  · Most people need 1000 to 1500 mg of calcium each day. Talk to your dietitian about how much calcium is recommended for you.  Shopping  · Buy plenty of fresh fruits and vegetables. Most people do not need to avoid fruits and vegetables, even if they contain nutrients that may contribute to kidney stones.  · When shopping for convenience foods, choose:  ? Whole pieces of fruit.  ? Premade salads with dressing on the side.  ? Low-fat fruit and yogurt smoothies.  · Avoid buying frozen meals or prepared deli foods.  · Look for foods with live cultures, such as yogurt and kefir.  Cooking  · Do not add salt to food when cooking. Place a salt shaker on the table and allow each person to add his or her own salt to taste.  · Use vegetable protein, such as beans, textured vegetable " protein (TVP), or tofu instead of meat in pasta, casseroles, and soups.  Meal planning    · Eat less salt, if told by your dietitian. To do this:  ? Avoid eating processed or premade food.  ? Avoid eating fast food.  · Eat less animal protein, including cheese, meat, poultry, or fish, if told by your dietitian. To do this:  ? Limit the number of times you have meat, poultry, fish, or cheese each week. Eat a diet free of meat at least 2 days a week.  ? Eat only one serving each day of meat, poultry, fish, or seafood.  ? When you prepare animal protein, cut pieces into small portion sizes. For most meat and fish, one serving is about the size of one deck of cards.  · Eat at least 5 servings of fresh fruits and vegetables each day. To do this:  ? Keep fruits and vegetables on hand for snacks.  ? Eat 1 piece of fruit or a handful of berries with breakfast.  ? Have a salad and fruit at lunch.  ? Have two kinds of vegetables at dinner.  · Limit foods that are high in a substance called oxalate. These include:  ? Spinach.  ? Rhubarb.  ? Beets.  ? Potato chips and french fries.  ? Nuts.  · If you regularly take a diuretic medicine, make sure to eat at least 1-2 fruits or vegetables high in potassium each day. These include:  ? Avocado.  ? Banana.  ? Orange, prune, carrot, or tomato juice.  ? Baked potato.  ? Cabbage.  ? Beans and split peas.  General instructions    · Drink enough fluid to keep your urine clear or pale yellow. This is the most important thing you can do.  · Talk to your health care provider and dietitian about taking daily supplements. Depending on your health and the cause of your kidney stones, you may be advised:  ? Not to take supplements with vitamin C.  ? To take a calcium supplement.  ? To take a daily probiotic supplement.  ? To take other supplements such as magnesium, fish oil, or vitamin B6.  · Take all medicines and supplements as told by your health care provider.  · Limit alcohol intake to no  more than 1 drink a day for nonpregnant women and 2 drinks a day for men. One drink equals 12 oz of beer, 5 oz of wine, or 1½ oz of hard liquor.  · Lose weight if told by your health care provider. Work with your dietitian to find strategies and an eating plan that works best for you.  What foods are not recommended?  Limit your intake of the following foods, or as told by your dietitian. Talk to your dietitian about specific foods you should avoid based on the type of kidney stones and your overall health.  Grains  Breads. Bagels. Rolls. Baked goods. Salted crackers. Cereal. Pasta.  Vegetables  Spinach. Rhubarb. Beets. Canned vegetables. Pickles. Olives.  Meats and other protein foods  Nuts. Nut butters. Large portions of meat, poultry, or fish. Salted or cured meats. Deli meats. Hot dogs. Sausages.  Dairy  Cheese.  Beverages  Regular soft drinks. Regular vegetable juice.  Seasonings and other foods  Seasoning blends with salt. Salad dressings. Canned soups. Soy sauce. Ketchup. Barbecue sauce. Canned pasta sauce. Casseroles. Pizza. Lasagna. Frozen meals. Potato chips. French fries.  Summary  · You can reduce your risk of kidney stones by making changes to your diet.  · The most important thing you can do is drink enough fluid. You should drink enough fluid to keep your urine clear or pale yellow.  · Ask your health care provider or dietitian how much protein from animal sources you should eat each day, and also how much salt and calcium you should have each day.  This information is not intended to replace advice given to you by your health care provider. Make sure you discuss any questions you have with your health care provider.  Document Revised: 04/08/2020 Document Reviewed: 11/28/2017  [employer]vier Patient Education © 2020 [employer]      Low-Purine Eating Plan  A low-purine eating plan involves making food choices to limit your intake of purine. Purine is a kind of uric acid. Too much uric acid in your blood  can cause certain conditions, such as gout and kidney stones. Eating a low-purine diet can help control these conditions.  What are tips for following this plan?  Reading food labels    · Avoid foods with saturated or Trans fat.  · Check the ingredient list of grains-based foods, such as bread and cereal, to make sure that they contain whole grains.  · Check the ingredient list of sauces or soups to make sure they do not contain meat or fish.  · When choosing soft drinks, check the ingredient list to make sure they do not contain high-fructose corn syrup.  Shopping  · Buy plenty of fresh fruits and vegetables.  · Avoid buying canned or fresh fish.  · Buy dairy products labeled as low-fat or nonfat.  · Avoid buying premade or processed foods. These foods are often high in fat, salt (sodium), and added sugar.  Cooking  · Use olive oil instead of butter when cooking. Oils like olive oil, canola oil, and sunflower oil contain healthy fats.  Meal planning  · Learn which foods do or do not affect you. If you find out that a food tends to cause your gout symptoms to flare up, avoid eating that food. You can enjoy foods that do not cause problems. If you have any questions about a food item, talk with your dietitian or health care provider.  · Limit foods high in fat, especially saturated fat. Fat makes it harder for your body to get rid of uric acid.  · Choose foods that are lower in fat and are lean sources of protein.  General guidelines  · Limit alcohol intake to no more than 1 drink a day for nonpregnant women and 2 drinks a day for men. One drink equals 12 oz of beer, 5 oz of wine, or 1½ oz of hard liquor. Alcohol can affect the way your body gets rid of uric acid.  · Drink plenty of water to keep your urine clear or pale yellow. Fluids can help remove uric acid from your body.  · If directed by your health care provider, take a vitamin C supplement.  · Work with your health care provider and dietitian to develop a  plan to achieve or maintain a healthy weight. Losing weight can help reduce uric acid in your blood.  What foods are recommended?  The items listed may not be a complete list. Talk with your dietitian about what dietary choices are best for you.  Foods low in purines  Foods low in purines do not need to be limited. These include:  · All fruits.  · All low-purine vegetables, pickles, and olives.  · Breads, pasta, rice, cornbread, and popcorn. Cake and other baked goods.  · All dairy foods.  · Eggs, nuts, and nut butters.  · Spices and condiments, such as salt, herbs, and vinegar.  · Plant oils, butter, and margarine.  · Water, sugar-free soft drinks, tea, coffee, and cocoa.  · Vegetable-based soups, broths, sauces, and gravies.  Foods moderate in purines  Foods moderate in purines should be limited to the amounts listed.  · ½ cup of asparagus, cauliflower, spinach, mushrooms, or green peas, each day.  · 2/3 cup uncooked oatmeal, each day.  · ¼ cup dry wheat bran or wheat germ, each day.  · 2-3 ounces of meat or poultry, each day.  · 4-6 ounces of shellfish, such as crab, lobster, oysters, or shrimp, each day.  · 1 cup cooked beans, peas, or lentils, each day.  · Soup, broths, or bouillon made from meat or fish. Limit these foods as much as possible.  What foods are not recommended?  The items listed may not be a complete list. Talk with your dietitian about what dietary choices are best for you.  Limit your intake of foods high in purines, including:  · Beer and other alcohol.  · Meat-based gravy or sauce.  · Canned or fresh fish, such as:  ? Anchovies, sardines, herring, and tuna.  ? Mussels and scallops.  ? Codfish, trout, and j luis.  · [place].  · Organ meats, such as:  ? Liver or kidney.  ? Tripe.  ? Sweetbreads (thymus gland or pancreas).  · Wild game or goose.  · Yeast or yeast extract supplements.  · Drinks sweetened with high-fructose corn syrup.  Summary  · Eating a low-purine diet can help control  conditions caused by too much uric acid in the body, such as gout or kidney stones.  · Choose low-purine foods, limit alcohol, and limit foods high in fat.  · You will learn over time which foods do or do not affect you. If you find out that a food tends to cause your gout symptoms to flare up, avoid eating that food.  This information is not intended to replace advice given to you by your health care provider. Make sure you discuss any questions you have with your health care provider.  Document Revised: 11/30/2018 Document Reviewed: 01/31/2018  [employer] Patient Education © 2020 [employer]    Discussed a kidney stone prevention diet to include increasing p.o. fluid intake, to at least 1 to 2 L of water daily.  She is to avoid caffeine products such as cola, coffee, and to avoid soft or soda drinks.  She is to decrease her sodium consumption as in  Fast foods, torres, salted nuts, canned foods, and smoked/cured foods. She is also to decrease her oxalate consumption, as in spinach, Nery greens, and Rhubarb.  Also important is to decrease protein intake, as in red meats, peanut butter, and also avoid nuts.

## 2020-12-01 NOTE — DISCHARGE INSTRUCTIONS

## 2020-12-02 ENCOUNTER — LAB (OUTPATIENT)
Dept: LAB | Facility: HOSPITAL | Age: 50
End: 2020-12-02

## 2020-12-02 ENCOUNTER — APPOINTMENT (OUTPATIENT)
Dept: PREADMISSION TESTING | Facility: HOSPITAL | Age: 50
End: 2020-12-02

## 2020-12-02 DIAGNOSIS — N20.0 RENAL CALCULUS, RIGHT: ICD-10-CM

## 2020-12-02 DIAGNOSIS — K42.9 UMBILICAL HERNIA WITHOUT OBSTRUCTION AND WITHOUT GANGRENE: ICD-10-CM

## 2020-12-02 LAB
ALBUMIN SERPL-MCNC: 4.8 G/DL (ref 3.5–5.2)
ALBUMIN/GLOB SERPL: 2 G/DL
ALP SERPL-CCNC: 51 U/L (ref 39–117)
ALT SERPL W P-5'-P-CCNC: 29 U/L (ref 1–41)
ANION GAP SERPL CALCULATED.3IONS-SCNC: 10.5 MMOL/L (ref 5–15)
AST SERPL-CCNC: 17 U/L (ref 1–40)
BASOPHILS # BLD AUTO: 0.07 10*3/MM3 (ref 0–0.2)
BASOPHILS NFR BLD AUTO: 0.7 % (ref 0–1.5)
BILIRUB SERPL-MCNC: 0.6 MG/DL (ref 0–1.2)
BUN SERPL-MCNC: 21 MG/DL (ref 6–20)
BUN/CREAT SERPL: 18.6 (ref 7–25)
CALCIUM SPEC-SCNC: 10.1 MG/DL (ref 8.6–10.5)
CHLORIDE SERPL-SCNC: 105 MMOL/L (ref 98–107)
CO2 SERPL-SCNC: 22.5 MMOL/L (ref 22–29)
CREAT SERPL-MCNC: 1.13 MG/DL (ref 0.76–1.27)
DEPRECATED RDW RBC AUTO: 42 FL (ref 37–54)
EOSINOPHIL # BLD AUTO: 0.12 10*3/MM3 (ref 0–0.4)
EOSINOPHIL NFR BLD AUTO: 1.2 % (ref 0.3–6.2)
ERYTHROCYTE [DISTWIDTH] IN BLOOD BY AUTOMATED COUNT: 13 % (ref 12.3–15.4)
GFR SERPL CREATININE-BSD FRML MDRD: 68 ML/MIN/1.73
GLOBULIN UR ELPH-MCNC: 2.4 GM/DL
GLUCOSE SERPL-MCNC: 111 MG/DL (ref 65–99)
HCT VFR BLD AUTO: 44.6 % (ref 37.5–51)
HGB BLD-MCNC: 14.6 G/DL (ref 13–17.7)
IMM GRANULOCYTES # BLD AUTO: 0.04 10*3/MM3 (ref 0–0.05)
IMM GRANULOCYTES NFR BLD AUTO: 0.4 % (ref 0–0.5)
LYMPHOCYTES # BLD AUTO: 2.18 10*3/MM3 (ref 0.7–3.1)
LYMPHOCYTES NFR BLD AUTO: 21.4 % (ref 19.6–45.3)
MCH RBC QN AUTO: 28.6 PG (ref 26.6–33)
MCHC RBC AUTO-ENTMCNC: 32.7 G/DL (ref 31.5–35.7)
MCV RBC AUTO: 87.5 FL (ref 79–97)
MONOCYTES # BLD AUTO: 0.8 10*3/MM3 (ref 0.1–0.9)
MONOCYTES NFR BLD AUTO: 7.8 % (ref 5–12)
NEUTROPHILS NFR BLD AUTO: 6.99 10*3/MM3 (ref 1.7–7)
NEUTROPHILS NFR BLD AUTO: 68.5 % (ref 42.7–76)
NRBC BLD AUTO-RTO: 0 /100 WBC (ref 0–0.2)
PLATELET # BLD AUTO: 287 10*3/MM3 (ref 140–450)
PMV BLD AUTO: 9.4 FL (ref 6–12)
POTASSIUM SERPL-SCNC: 4.1 MMOL/L (ref 3.5–5.2)
PROT SERPL-MCNC: 7.2 G/DL (ref 6–8.5)
RBC # BLD AUTO: 5.1 10*6/MM3 (ref 4.14–5.8)
SODIUM SERPL-SCNC: 138 MMOL/L (ref 136–145)
WBC # BLD AUTO: 10.2 10*3/MM3 (ref 3.4–10.8)

## 2020-12-02 PROCEDURE — 80053 COMPREHEN METABOLIC PANEL: CPT

## 2020-12-02 PROCEDURE — C9803 HOPD COVID-19 SPEC COLLECT: HCPCS

## 2020-12-02 PROCEDURE — 36415 COLL VENOUS BLD VENIPUNCTURE: CPT

## 2020-12-02 PROCEDURE — U0004 COV-19 TEST NON-CDC HGH THRU: HCPCS | Performed by: UROLOGY

## 2020-12-02 PROCEDURE — 85025 COMPLETE CBC W/AUTO DIFF WBC: CPT

## 2020-12-03 LAB — SARS-COV-2 RNA RESP QL NAA+PROBE: NOT DETECTED

## 2020-12-04 ENCOUNTER — ANESTHESIA (OUTPATIENT)
Dept: PERIOP | Facility: HOSPITAL | Age: 50
End: 2020-12-04

## 2020-12-04 ENCOUNTER — ANESTHESIA EVENT (OUTPATIENT)
Dept: PERIOP | Facility: HOSPITAL | Age: 50
End: 2020-12-04

## 2020-12-04 ENCOUNTER — HOSPITAL ENCOUNTER (OUTPATIENT)
Facility: HOSPITAL | Age: 50
Discharge: HOME | End: 2020-12-04
Attending: UROLOGY | Admitting: UROLOGY

## 2020-12-04 VITALS
DIASTOLIC BLOOD PRESSURE: 70 MMHG | HEART RATE: 82 BPM | RESPIRATION RATE: 16 BRPM | TEMPERATURE: 97.9 F | HEIGHT: 67 IN | WEIGHT: 228.2 LBS | SYSTOLIC BLOOD PRESSURE: 116 MMHG | BODY MASS INDEX: 35.82 KG/M2 | OXYGEN SATURATION: 96 %

## 2020-12-04 DIAGNOSIS — N20.0 RENAL CALCULUS, RIGHT: Primary | ICD-10-CM

## 2020-12-04 DIAGNOSIS — R31.0 GROSS HEMATURIA: ICD-10-CM

## 2020-12-04 LAB — GLUCOSE BLDC GLUCOMTR-MCNC: 100 MG/DL (ref 70–130)

## 2020-12-04 PROCEDURE — 25010000002 FENTANYL CITRATE (PF) 100 MCG/2ML SOLUTION: Performed by: NURSE ANESTHETIST, CERTIFIED REGISTERED

## 2020-12-04 PROCEDURE — 50590 FRAGMENTING OF KIDNEY STONE: CPT | Performed by: UROLOGY

## 2020-12-04 PROCEDURE — 25010000002 ONDANSETRON PER 1 MG: Performed by: NURSE ANESTHETIST, CERTIFIED REGISTERED

## 2020-12-04 PROCEDURE — 25010000002 KETOROLAC TROMETHAMINE PER 15 MG: Performed by: NURSE ANESTHETIST, CERTIFIED REGISTERED

## 2020-12-04 PROCEDURE — 25010000002 MIDAZOLAM PER 1 MG: Performed by: NURSE ANESTHETIST, CERTIFIED REGISTERED

## 2020-12-04 PROCEDURE — 25010000002 PROPOFOL 10 MG/ML EMULSION: Performed by: NURSE ANESTHETIST, CERTIFIED REGISTERED

## 2020-12-04 PROCEDURE — 82962 GLUCOSE BLOOD TEST: CPT

## 2020-12-04 PROCEDURE — 25010000002 GENTAMICIN PER 80 MG: Performed by: UROLOGY

## 2020-12-04 RX ORDER — PROPOFOL 10 MG/ML
VIAL (ML) INTRAVENOUS AS NEEDED
Status: DISCONTINUED | OUTPATIENT
Start: 2020-12-04 | End: 2020-12-04 | Stop reason: SURG

## 2020-12-04 RX ORDER — HYDROCODONE BITARTRATE AND ACETAMINOPHEN 10; 325 MG/1; MG/1
1 TABLET ORAL EVERY 4 HOURS PRN
Qty: 12 TABLET | Refills: 0 | Status: SHIPPED | OUTPATIENT
Start: 2020-12-04 | End: 2020-12-15

## 2020-12-04 RX ORDER — KETOROLAC TROMETHAMINE 30 MG/ML
INJECTION, SOLUTION INTRAMUSCULAR; INTRAVENOUS AS NEEDED
Status: DISCONTINUED | OUTPATIENT
Start: 2020-12-04 | End: 2020-12-04 | Stop reason: SURG

## 2020-12-04 RX ORDER — LIDOCAINE HYDROCHLORIDE 20 MG/ML
INJECTION, SOLUTION INFILTRATION; PERINEURAL AS NEEDED
Status: DISCONTINUED | OUTPATIENT
Start: 2020-12-04 | End: 2020-12-04 | Stop reason: SURG

## 2020-12-04 RX ORDER — SODIUM CHLORIDE, SODIUM LACTATE, POTASSIUM CHLORIDE, CALCIUM CHLORIDE 600; 310; 30; 20 MG/100ML; MG/100ML; MG/100ML; MG/100ML
100 INJECTION, SOLUTION INTRAVENOUS ONCE AS NEEDED
Status: DISCONTINUED | OUTPATIENT
Start: 2020-12-04 | End: 2020-12-04 | Stop reason: HOSPADM

## 2020-12-04 RX ORDER — SODIUM CHLORIDE 0.9 % (FLUSH) 0.9 %
10 SYRINGE (ML) INJECTION EVERY 12 HOURS SCHEDULED
Status: DISCONTINUED | OUTPATIENT
Start: 2020-12-04 | End: 2020-12-04 | Stop reason: HOSPADM

## 2020-12-04 RX ORDER — MEPERIDINE HYDROCHLORIDE 25 MG/ML
12.5 INJECTION INTRAMUSCULAR; INTRAVENOUS; SUBCUTANEOUS
Status: DISCONTINUED | OUTPATIENT
Start: 2020-12-04 | End: 2020-12-04 | Stop reason: HOSPADM

## 2020-12-04 RX ORDER — ONDANSETRON 2 MG/ML
INJECTION INTRAMUSCULAR; INTRAVENOUS AS NEEDED
Status: DISCONTINUED | OUTPATIENT
Start: 2020-12-04 | End: 2020-12-04 | Stop reason: SURG

## 2020-12-04 RX ORDER — MIDAZOLAM HYDROCHLORIDE 1 MG/ML
INJECTION INTRAMUSCULAR; INTRAVENOUS AS NEEDED
Status: DISCONTINUED | OUTPATIENT
Start: 2020-12-04 | End: 2020-12-04 | Stop reason: SURG

## 2020-12-04 RX ORDER — FENTANYL CITRATE 50 UG/ML
INJECTION, SOLUTION INTRAMUSCULAR; INTRAVENOUS AS NEEDED
Status: DISCONTINUED | OUTPATIENT
Start: 2020-12-04 | End: 2020-12-04 | Stop reason: SURG

## 2020-12-04 RX ORDER — GENTAMICIN SULFATE 80 MG/100ML
80 INJECTION, SOLUTION INTRAVENOUS ONCE
Status: COMPLETED | OUTPATIENT
Start: 2020-12-04 | End: 2020-12-04

## 2020-12-04 RX ORDER — DROPERIDOL 2.5 MG/ML
0.62 INJECTION, SOLUTION INTRAMUSCULAR; INTRAVENOUS ONCE AS NEEDED
Status: DISCONTINUED | OUTPATIENT
Start: 2020-12-04 | End: 2020-12-04 | Stop reason: HOSPADM

## 2020-12-04 RX ORDER — FENTANYL CITRATE 50 UG/ML
50 INJECTION, SOLUTION INTRAMUSCULAR; INTRAVENOUS
Status: DISCONTINUED | OUTPATIENT
Start: 2020-12-04 | End: 2020-12-04 | Stop reason: HOSPADM

## 2020-12-04 RX ORDER — FAMOTIDINE 10 MG/ML
INJECTION, SOLUTION INTRAVENOUS AS NEEDED
Status: DISCONTINUED | OUTPATIENT
Start: 2020-12-04 | End: 2020-12-04 | Stop reason: SURG

## 2020-12-04 RX ORDER — SODIUM CHLORIDE, SODIUM LACTATE, POTASSIUM CHLORIDE, CALCIUM CHLORIDE 600; 310; 30; 20 MG/100ML; MG/100ML; MG/100ML; MG/100ML
INJECTION, SOLUTION INTRAVENOUS CONTINUOUS PRN
Status: DISCONTINUED | OUTPATIENT
Start: 2020-12-04 | End: 2020-12-04 | Stop reason: SURG

## 2020-12-04 RX ORDER — SODIUM CHLORIDE 0.9 % (FLUSH) 0.9 %
10 SYRINGE (ML) INJECTION AS NEEDED
Status: DISCONTINUED | OUTPATIENT
Start: 2020-12-04 | End: 2020-12-04 | Stop reason: HOSPADM

## 2020-12-04 RX ORDER — MIDAZOLAM HYDROCHLORIDE 1 MG/ML
1 INJECTION INTRAMUSCULAR; INTRAVENOUS
Status: DISCONTINUED | OUTPATIENT
Start: 2020-12-04 | End: 2020-12-04 | Stop reason: HOSPADM

## 2020-12-04 RX ORDER — OXYCODONE HYDROCHLORIDE AND ACETAMINOPHEN 5; 325 MG/1; MG/1
1 TABLET ORAL ONCE AS NEEDED
Status: DISCONTINUED | OUTPATIENT
Start: 2020-12-04 | End: 2020-12-04 | Stop reason: HOSPADM

## 2020-12-04 RX ORDER — IPRATROPIUM BROMIDE AND ALBUTEROL SULFATE 2.5; .5 MG/3ML; MG/3ML
3 SOLUTION RESPIRATORY (INHALATION) ONCE AS NEEDED
Status: DISCONTINUED | OUTPATIENT
Start: 2020-12-04 | End: 2020-12-04 | Stop reason: HOSPADM

## 2020-12-04 RX ORDER — SODIUM CHLORIDE, SODIUM LACTATE, POTASSIUM CHLORIDE, CALCIUM CHLORIDE 600; 310; 30; 20 MG/100ML; MG/100ML; MG/100ML; MG/100ML
125 INJECTION, SOLUTION INTRAVENOUS ONCE
Status: COMPLETED | OUTPATIENT
Start: 2020-12-04 | End: 2020-12-04

## 2020-12-04 RX ORDER — ONDANSETRON 2 MG/ML
4 INJECTION INTRAMUSCULAR; INTRAVENOUS AS NEEDED
Status: DISCONTINUED | OUTPATIENT
Start: 2020-12-04 | End: 2020-12-04 | Stop reason: HOSPADM

## 2020-12-04 RX ADMIN — FENTANYL CITRATE 100 MCG: 50 INJECTION INTRAMUSCULAR; INTRAVENOUS at 10:05

## 2020-12-04 RX ADMIN — PROPOFOL 140 MG: 10 INJECTION, EMULSION INTRAVENOUS at 09:36

## 2020-12-04 RX ADMIN — FAMOTIDINE 20 MG: 10 INJECTION INTRAVENOUS at 09:33

## 2020-12-04 RX ADMIN — MIDAZOLAM HYDROCHLORIDE 2 MG: 1 INJECTION, SOLUTION INTRAMUSCULAR; INTRAVENOUS at 09:33

## 2020-12-04 RX ADMIN — LIDOCAINE HYDROCHLORIDE 20 MG: 20 INJECTION, SOLUTION INFILTRATION; PERINEURAL at 09:36

## 2020-12-04 RX ADMIN — SODIUM CHLORIDE, POTASSIUM CHLORIDE, SODIUM LACTATE AND CALCIUM CHLORIDE: 600; 310; 30; 20 INJECTION, SOLUTION INTRAVENOUS at 09:30

## 2020-12-04 RX ADMIN — GENTAMICIN SULFATE 80 MG: 80 INJECTION, SOLUTION INTRAVENOUS at 09:30

## 2020-12-04 RX ADMIN — KETOROLAC TROMETHAMINE 30 MG: 30 INJECTION, SOLUTION INTRAMUSCULAR; INTRAVENOUS at 09:38

## 2020-12-04 RX ADMIN — ONDANSETRON 4 MG: 2 INJECTION INTRAMUSCULAR; INTRAVENOUS at 09:33

## 2020-12-04 RX ADMIN — SODIUM CHLORIDE, POTASSIUM CHLORIDE, SODIUM LACTATE AND CALCIUM CHLORIDE 125 ML/HR: 600; 310; 30; 20 INJECTION, SOLUTION INTRAVENOUS at 09:23

## 2020-12-04 NOTE — OP NOTE
EXTRACORPOREAL SHOCKWAVE LITHOTRIPSY  Procedure Note    Austin Sneed  12/4/2020    Pre-op Diagnosis:   Gross hematuria [R31.0]    Post-op Diagnosis:     Post-Op Diagnosis Codes:     * Gross hematuria [R31.0]    Procedure/CPT® Codes:  49-year-old white male with a right obstructing painful stone.  ESWL-the patient is a candidate for extracorporeal shockwave lithotripsy.  We discussed the type of stone.  And the complications associated with the procedure including but not limited to pain in the flank, hematoma, spontaneous renal hemorrhage, and adequate fragmentation of stones.  The need for passage of the stones.  The need for concomitant additional procedures in the range of 24% the risk of a distal fragment in the range of 3% requiring ureteroscopic removal..  Fact that sometimes a stent is indicated based on the size and the density of the stone as determined on the CAT scan.  Following an informed consent he is brought to the operative suite and underwent induction of general endotracheal anesthetic the stone was localized at F2 and a total of 3000 shockwaves administered without complication.  There was excellent fragmentation he was awake and alert return to recovery room          Procedure(s):  EXTRACORPOREAL SHOCKWAVE LITHOTRIPSY    Surgeon(s):  Pablo Crenshaw MD    Anesthesia: see anesthesia record    Staff:   Circulator: Jeniffer Jansen RN  Scrub Person: Deana Garg LPN; Hue Coelho    Estimated Blood Loss: none  Urine Voided: * No values recorded between 12/4/2020  9:32 AM and 12/4/2020 10:09 AM *    Specimens:                None      Drains: None    Findings: Good fragmentation    Blood: N/A    Complications: None    Grafts and Implants: None    Pablo Crenshaw MD     Date: 12/4/2020  Time: 10:11 EST

## 2020-12-04 NOTE — ANESTHESIA PROCEDURE NOTES
Airway  Urgency: elective    Date/Time: 12/4/2020 9:36 AM  Airway not difficult    General Information and Staff    Patient location during procedure: OR  CRNA: Salina Granger CRNA    Indications and Patient Condition  Indications for airway management: airway protection    Preoxygenated: yes  MILS maintained throughout  Mask difficulty assessment: 0 - not attempted    Final Airway Details  Final airway type: supraglottic airway      Successful airway: unique  Size 4    Number of attempts at approach: 1  Assessment: lips, teeth, and gum same as pre-op

## 2020-12-04 NOTE — ANESTHESIA PREPROCEDURE EVALUATION
Anesthesia Evaluation     no history of anesthetic complications:  NPO Solid Status: > 8 hours  NPO Liquid Status: > 8 hours           Airway   Mallampati: III  Neck ROM: full  Possible difficult intubation  Dental - normal exam     Pulmonary - normal exam   (+) shortness of breath, sleep apnea,   Cardiovascular - normal exam  Exercise tolerance: excellent (>7 METS)    NYHA Classification: II    (+) hypertension, CAD, hyperlipidemia,       Neuro/Psych- negative ROS  GI/Hepatic/Renal/Endo    (+) obesity,   renal disease, diabetes mellitus,     Musculoskeletal (-) negative ROS    Abdominal  - normal exam   Substance History - negative use     OB/GYN negative ob/gyn ROS         Other                          Anesthesia Plan    ASA 3     general     intravenous and inhalational induction     Anesthetic plan, all risks, benefits, and alternatives have been provided, discussed and informed consent has been obtained with: patient.  Use of blood products discussed with patient .   Plan discussed with CRNA.

## 2020-12-04 NOTE — ANESTHESIA POSTPROCEDURE EVALUATION
Patient: Austin Sneed    Procedure Summary     Date: 12/04/20 Room / Location: [facility] OR 06 / [facility] OR    Anesthesia Start: 0930 Anesthesia Stop: 1012    Procedure: EXTRACORPOREAL SHOCKWAVE LITHOTRIPSY (Right ) Diagnosis:       Gross hematuria      (Gross hematuria [R31.0])    Surgeon: Pablo Crenshaw MD Provider: Min Kay MD    Anesthesia Type: general ASA Status: 3          Anesthesia Type: general    Vitals  No vitals data found for the desired time range.          Post Anesthesia Care and Evaluation    Patient location during evaluation: PHASE II  Patient participation: complete - patient participated  Level of consciousness: awake and alert  Pain score: 1  Pain management: adequate  Airway patency: patent  Anesthetic complications: No anesthetic complications  PONV Status: controlled  Cardiovascular status: acceptable  Respiratory status: acceptable  Hydration status: acceptable

## 2020-12-15 ENCOUNTER — HOSPITAL ENCOUNTER (OUTPATIENT)
Dept: GENERAL RADIOLOGY | Facility: HOSPITAL | Age: 50
Discharge: HOME | End: 2020-12-15
Admitting: NURSE PRACTITIONER

## 2020-12-15 ENCOUNTER — OFFICE VISIT (OUTPATIENT)
Dept: UROLOGY | Facility: CLINIC | Age: 50
End: 2020-12-15

## 2020-12-15 VITALS — TEMPERATURE: 96.8 F | WEIGHT: 237 LBS | BODY MASS INDEX: 37.2 KG/M2 | HEIGHT: 67 IN

## 2020-12-15 DIAGNOSIS — R10.9 ACUTE FLANK PAIN: ICD-10-CM

## 2020-12-15 DIAGNOSIS — R10.9 ACUTE FLANK PAIN: Primary | ICD-10-CM

## 2020-12-15 DIAGNOSIS — N20.0 NEPHROLITHIASIS: ICD-10-CM

## 2020-12-15 DIAGNOSIS — R30.0 DYSURIA: ICD-10-CM

## 2020-12-15 LAB
BILIRUB BLD-MCNC: NEGATIVE MG/DL
CLARITY, POC: ABNORMAL
COLOR UR: YELLOW
GLUCOSE UR STRIP-MCNC: NEGATIVE MG/DL
KETONES UR QL: NEGATIVE
LEUKOCYTE EST, POC: NEGATIVE
NITRITE UR-MCNC: NEGATIVE MG/ML
PH UR: 5 [PH] (ref 5–8)
PROT UR STRIP-MCNC: ABNORMAL MG/DL
RBC # UR STRIP: ABNORMAL /UL
SP GR UR: 1.02 (ref 1–1.03)
UROBILINOGEN UR QL: NORMAL

## 2020-12-15 PROCEDURE — 81003 URINALYSIS AUTO W/O SCOPE: CPT | Performed by: NURSE PRACTITIONER

## 2020-12-15 PROCEDURE — 74018 RADEX ABDOMEN 1 VIEW: CPT

## 2020-12-15 PROCEDURE — 99024 POSTOP FOLLOW-UP VISIT: CPT | Performed by: NURSE PRACTITIONER

## 2020-12-15 PROCEDURE — 74018 RADEX ABDOMEN 1 VIEW: CPT | Performed by: RADIOLOGY

## 2020-12-15 RX ORDER — HYDROCODONE BITARTRATE AND ACETAMINOPHEN 10; 325 MG/1; MG/1
1 TABLET ORAL EVERY 6 HOURS PRN
Qty: 12 TABLET | Refills: 0 | Status: SHIPPED | OUTPATIENT
Start: 2020-12-15 | End: 2021-02-01 | Stop reason: SDUPTHER

## 2020-12-15 NOTE — PROGRESS NOTES
Chief Complaint:          Chief Complaint   Patient presents with   • Post-op Follow-up       HPI:   49 y.o. male.Pt presents for follow-up today.  He had Extracorporal Shockwave Lithotripsy  (ESWL) for Right Painful stone  By Dr. Crenshaw on 12/04/2020.  He reports doing relatively well post procedure and is in no apparent distress upon exam today.However, patient reports Right lower quadrant abdominal pain and pelvic pain that has been constant since yesterday.  He states this is becoming very bothersome to him.    He has not had any post opt complications associated with the procedure  such as  Fevers, N/V, severe  pain in the flank, hematoma, spontaneous renal hemorrhage, or the risk of distal fragments.  He reports he has passed adequate fragmentation of stones, and most often has to have a bowel movement each time he does.    Will repeat a KUB    Past Medical History:        Past Medical History:   Diagnosis Date   • Arthritis    • Coronary artery disease    • Diabetes mellitus (CMS/HCC)    • Elevated cholesterol    • Fracture    • GERD (gastroesophageal reflux disease)    • Hyperlipidemia    • Hypertension    • Kidney stones    • Sleep apnea     no c-pap       The following portions of the patient's history were reviewed and updated as appropriate: allergies, current medications, past family history, past medical history, past social history, past surgical history and problem list.    Current Meds:     Current Outpatient Medications   Medication Sig Dispense Refill   • aspirin 81 MG EC tablet Take 243 mg by mouth Daily. Takes 3 tabs per dose     • atorvastatin (LIPITOR) 20 MG tablet Take 20 mg by mouth Daily.     • Blood Glucose Monitoring Suppl (TRUE METRIX METER) w/Device kit Use as directed to test Blood Sugar. (Patient taking differently: States does not check sugar at home) 1 kit 0   • clopidogrel (PLAVIX) 75 MG tablet Take 75 mg by mouth Daily. LAST DOSE 12/12/19. PER DR TEE     • glucose blood test  strip Use as directed to test Blood Sugar 2 times a day. (Patient taking differently: Use as directed to test Blood Sugar 2 times a day.    States does not check sugar at home) 50 each 0   • metFORMIN ER (GLUCOPHAGE-XR) 500 MG 24 hr tablet Take 1,000 mg by mouth 2 (two) times a day.     • metoprolol succinate XL (TOPROL-XL) 50 MG 24 hr tablet Take 1 tablet by mouth Daily.     • naproxen (NAPROSYN) 500 MG tablet Take 500 mg by mouth Daily.     • Ozempic, 0.25 or 0.5 MG/DOSE, 2 MG/1.5ML solution pen-injector Inject  under the skin into the appropriate area as directed 1 (One) Time Per Week.     • TRUEPLUS LANCETS 28G misc Use as Directed to test Blood Sugar 2 times a day (Patient taking differently: States does not check sugar at home) 50 each 0     No current facility-administered medications for this visit.         Allergies:      Allergies   Allergen Reactions   • Penicillins Hives        Past Surgical History:     Past Surgical History:   Procedure Laterality Date   • CARDIAC CATHETERIZATION     • COLONOSCOPY     • CORONARY STENT PLACEMENT  2015    x1 stent [place] facility   • EXTRACORPOREAL SHOCK WAVE LITHOTRIPSY (ESWL) Right 8/21/2020    Procedure: EXTRACORPOREAL SHOCKWAVE LITHOTRIPSY;  Surgeon: Pablo Crenshaw MD;  Location: [facility];  Service: Urology;  Laterality: Right;   • EXTRACORPOREAL SHOCK WAVE LITHOTRIPSY (ESWL) Right 12/4/2020    Procedure: EXTRACORPOREAL SHOCKWAVE LITHOTRIPSY;  Surgeon: Pablo Crenshaw MD;  Location: [facility] OR;  Service: Urology;  Laterality: Right;   • FRACTURE SURGERY Right     orif-ankle   • ORIF FIBULA FRACTURE Right 12/17/2019    Procedure: OPEN REDUCTION INTERNAL FIXATION RIGHT DISTAL FIBULA;  Surgeon: Christian Brooks MD;  Location: [facility];  Service: Orthopedics         Social History:     Social History     Socioeconomic History   • Marital status:      Spouse name: Not on file   • Number of children: Not on file   • Years of education: Not on  file   • Highest education level: Not on file   Tobacco Use   • Smoking status: Former Smoker     Packs/day: 0.50     Years: 10.00     Pack years: 5.00     Types: Cigarettes     Quit date:      Years since quittin.9   • Smokeless tobacco: Never Used   Substance and Sexual Activity   • Alcohol use: Yes     Comment: social   • Drug use: Never   • Sexual activity: Defer       Family History:     Family History   Problem Relation Age of Onset   • Heart disease Father    • Heart attack Maternal Grandmother        Review of Systems:     Review of Systems   Constitutional: Positive for fatigue. Negative for activity change, appetite change, chills and fever.   HENT: Negative for congestion and sinus pressure.    Eyes: Negative for blurred vision and double vision.   Respiratory: Negative for cough, shortness of breath and wheezing.    Cardiovascular: Negative for leg swelling.   Gastrointestinal: Positive for abdominal distention, abdominal pain and nausea. Negative for constipation, diarrhea and vomiting.   Genitourinary: Positive for flank pain, hematuria and nocturia. Negative for decreased urine volume, difficulty urinating, discharge, dysuria, frequency, genital sores, penile pain, penile swelling, scrotal swelling, testicular pain, urgency and urinary incontinence.   Musculoskeletal: Positive for back pain.   Neurological: Negative for dizziness, weakness, headache and confusion.   Psychiatric/Behavioral: Positive for hallucinations, sleep disturbance and stress. Negative for agitation, behavioral problems and decreased concentration. The patient is not nervous/anxious.         Physical Exam:     Physical Exam  Constitutional:       General: He is not in acute distress.     Appearance: He is well-developed. He is obese. He is ill-appearing.   HENT:      Head: Normocephalic and atraumatic.      Right Ear: External ear normal.      Left Ear: External ear normal.   Eyes:      General:         Right eye: No  discharge.         Left eye: No discharge.      Conjunctiva/sclera: Conjunctivae normal.      Pupils: Pupils are equal, round, and reactive to light.   Neck:      Musculoskeletal: Normal range of motion and neck supple.      Thyroid: No thyromegaly.      Trachea: No tracheal deviation.   Cardiovascular:      Rate and Rhythm: Normal rate and regular rhythm.      Heart sounds: No murmur. No friction rub.   Pulmonary:      Effort: Pulmonary effort is normal. No respiratory distress.      Breath sounds: Normal breath sounds. No stridor.   Abdominal:      General: Bowel sounds are normal. There is distension.      Palpations: Abdomen is soft.      Tenderness: There is abdominal tenderness. There is no guarding.   Genitourinary:     Penis: Normal and uncircumcised. No tenderness or discharge.       Scrotum/Testes: Normal.      Rectum: Normal. Guaiac result negative.   Musculoskeletal: Normal range of motion.         General: Tenderness present. No deformity.   Skin:     General: Skin is warm and dry.      Capillary Refill: Capillary refill takes less than 2 seconds.      Coloration: Skin is not pale.   Neurological:      Mental Status: He is alert and oriented to person, place, and time.      Cranial Nerves: No cranial nerve deficit.      Motor: Weakness present.      Coordination: Coordination normal.   Psychiatric:         Behavior: Behavior normal.         Thought Content: Thought content normal.         Judgment: Judgment normal.         Procedure:       Assessment/Plan:         ASSESSMENT  Right  Renal Calculus / Post ESWL - Abdominal Pain/Hematuria: Left Renal Calculus/Post ESWL: Patient is seen for follow up today. He reports doing relatively well post procedure ESWL on 12/04/20 for a Right 1 CM UPJ stone. He denies having any post opt complications such as fevers, chills, bleeding, hematoma,  Until yesterday he has had constant RLQ pain, hematuria and pelvic pain. Patient reports this is very bothersome to him. He  has been unable to rest due to his discomfort.    We both reviewed his repeat KUB today which shows adequate fragmentation of the Right sided 10   mm stone. Nevertheless,  Multiple calcifications are overlying the collecting system of the right kidney. The largest calcification is in the area of the renal pelvis. It measures approximately a centimeter in diameter.  The others are smaller measuring less than 5 mm. No definite calcifications are noted along the course of the ureters or overlying the left kidney.     Again, We discussed the absolute relative indicators for intervention including the presence of sepsis, and pain we cannot control as the primary need for urgent intervention.                                                                                                               PLAN  I Discussed this case with Dr Shi who is very familiar with this patient, and is agreeable with the plan of care.      The patient has been scheduled for CT Renal Stone Protocol -Abdominal pain/Gross Hematuria    He has been given Adequate Pain medications Hydrocodone 10/325, just in case, and flomax and nausea meds to use PRN.     He may alternate NSAIDS/Tylenol PRN for break through pain.    Will follow up after CT /GO TO ER IF WORSENING     Narcotic pain medication-patient has significant acute pain that I believe would be an indication for the use of narcotic pain medication.  I discussed the significant risks of pain medication and the fact that this will be a short only option and I will give her no more than a three-day supply of pain medication.  And I will not plan long-term medication and that this will be sent to a pain clinic if at all becomes necessary.       We discussed signing a pain medication agreement and the fact that we're going to run a state [facility] review to be sure the patient is not getting pain medication from elsewhere.  If this is the case we will not give pain medication.  As part of the  patient's treatment plan of there being prescribed a controlled substance with potential for abuse.     This patient has been Made aware of the appropriate dose of such medications including, the risk for somnolence, limited ability to drive and/or safety and the significant potential for overdose.  It is been made clear that these medications are for the prescribed patient only without concomitant use of alcohol or other sepsis unless prescribed by the medical provider. Has completed prescribing agreement detailing the terms of continue prescribing him a controlled substance.  Including monitoring Kim ports, the possibility of urine drug screens and pedal counts.  The patient is aware that we reviewed KIM reports on a regular basis and scan them into the chart.  History and physical examination exhibited continued safe and appropriate use of controlled substances.     Also discussed the fact that the new [place] legislation allows only a three-day prescription for pain medication.  In this situation he will be referred to a chronic pain clinic.     Patient is Agreeable plan of care.    Patient reports that he is not currently experiencing any symptoms of urinary incontinence.    Patient's Body mass index is 37.12 kg/m². BMI is above normal parameters. Recommendations include: educational material, exercise counseling and nutrition counseling.    Smoking Cessation Counseling:  Former smoker.  Patient does not currently use any tobacco products.     Counseling was given to patient for the following topics diagnostic results including: Right Nephrolithiasis post ESWL, instructions for management as follows: Increase ambulation, increase activity, warm compresses to flank area if discomfort, hot baths hot showers, increase p.o. fluid intake 2 to 3 L daily. and risk factor reductions including: High oxalate foods, high sodium/high calcium products, avoiding bladder irritants such as caffeine products, soda  drinks, citrusy/spicy foods.. The interim medical history and current results were reviewed.  A treatment plan with follow-up was made for Right nephrolithiasis  Acute flank pain [R10.9].          This document has been electronically signed by Griselda Cheng-Akwa, APRN December 15, 2020 13:57 EST

## 2020-12-31 ENCOUNTER — HOSPITAL ENCOUNTER (OUTPATIENT)
Dept: CT IMAGING | Facility: HOSPITAL | Age: 50
Discharge: HOME | End: 2020-12-31
Admitting: UROLOGY

## 2020-12-31 DIAGNOSIS — R10.9 ACUTE FLANK PAIN: ICD-10-CM

## 2020-12-31 PROCEDURE — 74176 CT ABD & PELVIS W/O CONTRAST: CPT

## 2020-12-31 PROCEDURE — 74176 CT ABD & PELVIS W/O CONTRAST: CPT | Performed by: RADIOLOGY

## 2021-01-26 ENCOUNTER — TELEPHONE (OUTPATIENT)
Dept: CARDIOLOGY | Facility: CLINIC | Age: 51
End: 2021-01-26

## 2021-01-26 NOTE — TELEPHONE ENCOUNTER
Please call patient regarding his request for a stress test.  Patient states he has been feeling fatigued.

## 2021-01-26 NOTE — TELEPHONE ENCOUNTER
Spoke with patient.  States he has been extra tired but denies any other symptoms.  He is requesting an order for stress.  He has a follow scheduled on 02/17

## 2021-02-01 ENCOUNTER — OFFICE VISIT (OUTPATIENT)
Dept: UROLOGY | Facility: CLINIC | Age: 51
End: 2021-02-01

## 2021-02-01 VITALS — TEMPERATURE: 96.5 F | BODY MASS INDEX: 38.14 KG/M2 | HEIGHT: 67 IN | WEIGHT: 243 LBS

## 2021-02-01 DIAGNOSIS — R10.9 ACUTE FLANK PAIN: ICD-10-CM

## 2021-02-01 DIAGNOSIS — N20.0 RENAL CALCULUS, RIGHT: Primary | ICD-10-CM

## 2021-02-01 PROCEDURE — 99024 POSTOP FOLLOW-UP VISIT: CPT | Performed by: UROLOGY

## 2021-02-01 RX ORDER — HYDROCODONE BITARTRATE AND ACETAMINOPHEN 10; 325 MG/1; MG/1
1 TABLET ORAL EVERY 6 HOURS PRN
Qty: 12 TABLET | Refills: 0 | Status: SHIPPED | OUTPATIENT
Start: 2021-02-01 | End: 2021-02-17

## 2021-02-01 NOTE — PROGRESS NOTES
Chief Complaint:          Chief Complaint   Patient presents with   • Nephrolithiasis     CT review       HPI:   50 y.o. male is post a lithotripsy on December 4, 2020 for 14 mm nonobstructing right renal pelvic stone is now down to 7 causing intermittent Pain he is still having significant pain and wants pain medication.  I will set up an additional lithotripsy.  Renal calculus-we discussed the presence of the stone we discussed the various therapeutic options available including percutaneous nephrostolithotomy, lithotripsy.  We discussed the risks of lithotripsy including the passage of stones the development of a large string of stones in the distal ureter known as Steinstrasse.  In the 3% incidence of that we will need to proceed with a ureteroscopy for obstructing fragments.  Extremely rare incidence of renal hematoma.  And the significance of this.  We discussed the absolute relative indicators for intervention including the presence of sepsis, and pain we cannot control is the primary need for urgent intervention.  We discussed placement of a stent if indicated and the management of the stent as well.  ESWL-the patient is a candidate for extracorporeal shockwave lithotripsy.  We discussed the type of stone.  And the complications associated with the procedure including but not limited to pain in the flank, hematoma, spontaneous renal hemorrhage, and adequate fragmentation of stones.  The need for passage of the stones.  The need for concomitant additional procedures in the range of 24% the risk of a distal fragment in the range of 3% requiring ureteroscopic removal        Past Medical History:        Past Medical History:   Diagnosis Date   • Arthritis    • Coronary artery disease    • Diabetes mellitus (CMS/HCC)    • Elevated cholesterol    • Fracture    • GERD (gastroesophageal reflux disease)    • Hyperlipidemia    • Hypertension    • Kidney stones    • Sleep apnea     no c-pap         Current Meds:          Current Outpatient Medications   Medication Sig Dispense Refill   • aspirin 81 MG EC tablet Take 243 mg by mouth Daily. Takes 3 tabs per dose     • atorvastatin (LIPITOR) 20 MG tablet Take 20 mg by mouth Daily.     • Blood Glucose Monitoring Suppl (TRUE METRIX METER) w/Device kit Use as directed to test Blood Sugar. (Patient taking differently: States does not check sugar at home) 1 kit 0   • clopidogrel (PLAVIX) 75 MG tablet Take 75 mg by mouth Daily. LAST DOSE 12/12/19. PER DR TEE     • glucose blood test strip Use as directed to test Blood Sugar 2 times a day. (Patient taking differently: Use as directed to test Blood Sugar 2 times a day.    States does not check sugar at home) 50 each 0   • HYDROcodone-acetaminophen (Norco)  MG per tablet Take 1 tablet by mouth Every 6 (Six) Hours As Needed for Moderate Pain . 12 tablet 0   • metFORMIN ER (GLUCOPHAGE-XR) 500 MG 24 hr tablet Take 1,000 mg by mouth 2 (two) times a day.     • metoprolol succinate XL (TOPROL-XL) 50 MG 24 hr tablet Take 1 tablet by mouth Daily.     • naproxen (NAPROSYN) 500 MG tablet Take 500 mg by mouth Daily.     • Ozempic, 0.25 or 0.5 MG/DOSE, 2 MG/1.5ML solution pen-injector Inject  under the skin into the appropriate area as directed 1 (One) Time Per Week.     • TRUEPLUS LANCETS 28G misc Use as Directed to test Blood Sugar 2 times a day (Patient taking differently: States does not check sugar at home) 50 each 0     No current facility-administered medications for this visit.         Allergies:      Allergies   Allergen Reactions   • Penicillins Hives        Past Surgical History:     Past Surgical History:   Procedure Laterality Date   • CARDIAC CATHETERIZATION     • COLONOSCOPY     • CORONARY STENT PLACEMENT  2015    x1 stent [place] [facility]   • EXTRACORPOREAL SHOCK WAVE LITHOTRIPSY (ESWL) Right 8/21/2020    Procedure: EXTRACORPOREAL SHOCKWAVE LITHOTRIPSY;  Surgeon: Pablo Crenshaw MD;  Location: [facility];  Service: Urology;   Laterality: Right;   • EXTRACORPOREAL SHOCK WAVE LITHOTRIPSY (ESWL) Right 2020    Procedure: EXTRACORPOREAL SHOCKWAVE LITHOTRIPSY;  Surgeon: Pablo Crenshaw MD;  Location: [facility];  Service: Urology;  Laterality: Right;   • FRACTURE SURGERY Right     orif-ankle   • ORIF FIBULA FRACTURE Right 2019    Procedure: OPEN REDUCTION INTERNAL FIXATION RIGHT DISTAL FIBULA;  Surgeon: Christian Brooks MD;  Location: [facility];  Service: Orthopedics         Social History:     Social History     Socioeconomic History   • Marital status:      Spouse name: Not on file   • Number of children: Not on file   • Years of education: Not on file   • Highest education level: Not on file   Tobacco Use   • Smoking status: Former Smoker     Packs/day: 0.50     Years: 10.00     Pack years: 5.00     Types: Cigarettes     Quit date:      Years since quittin.0   • Smokeless tobacco: Never Used   Substance and Sexual Activity   • Alcohol use: Yes     Comment: social   • Drug use: Never   • Sexual activity: Defer       Family History:     Family History   Problem Relation Age of Onset   • Heart disease Father    • Heart attack Maternal Grandmother        Review of Systems:     Review of Systems   Constitutional: Negative.    HENT: Negative.    Eyes: Negative.    Respiratory: Negative.    Cardiovascular: Negative.    Gastrointestinal: Negative.    Endocrine: Negative.    Genitourinary: Positive for flank pain.   Allergic/Immunologic: Negative.    Neurological: Negative.    Hematological: Negative.    Psychiatric/Behavioral: Negative.        Physical Exam:     Physical Exam  Vitals signs and nursing note reviewed.   Constitutional:       Appearance: He is well-developed.   HENT:      Head: Normocephalic and atraumatic.   Eyes:      Conjunctiva/sclera: Conjunctivae normal.      Pupils: Pupils are equal, round, and reactive to light.   Neck:      Musculoskeletal: Normal range of motion.   Cardiovascular:       Rate and Rhythm: Normal rate and regular rhythm.      Heart sounds: Normal heart sounds.   Pulmonary:      Effort: Pulmonary effort is normal.      Breath sounds: Normal breath sounds.   Abdominal:      General: Bowel sounds are normal.      Palpations: Abdomen is soft.   Musculoskeletal: Normal range of motion.   Skin:     General: Skin is warm and dry.   Neurological:      Mental Status: He is alert and oriented to person, place, and time.      Deep Tendon Reflexes: Reflexes are normal and symmetric.   Psychiatric:         Behavior: Behavior normal.         Thought Content: Thought content normal.         Judgment: Judgment normal.         I have reviewed the following portions of the patient's history: allergies, current medications, past family history, past medical history, past social history, past surgical history, problem list and ROS and confirm it's accurate.      Procedure:       Assessment/Plan:   Renal calculus-we discussed the presence of the stone we discussed the various therapeutic options available including percutaneous nephrostolithotomy, lithotripsy.  We discussed the risks of lithotripsy including the passage of stones the development of a large string of stones in the distal ureter known as Steinstrasse.  In the 3% incidence of that we will need to proceed with a ureteroscopy for obstructing fragments.  Extremely rare incidence of renal hematoma.  And the significance of this.  We discussed the absolute relative indicators for intervention including the presence of sepsis, and pain we cannot control is the primary need for urgent intervention.  We discussed placement of a stent if indicated and the management of the stent as well.  Narcotic pain medication-patient has significant acute pain that I believe would be an indication for the use of narcotic pain medication.  I discussed the significant risks of pain medication and the fact that this will be a short only option and I will give her no  more than a three-day supply of pain medication.  And I will not plan long-term medication and that this will be sent to a pain clinic if at all becomes necessary.  We discussed signing a pain medication agreement and the fact that we're going to run a state KIM review to be sure the patient is not getting pain medication from elsewhere.  If this is the case we will not give pain medication.  As part of the patient's treatment plan of there being prescribed a controlled substance with potential for abuse.  This patient has been wait aware of the appropriate dose of such medications including, the risk for somnolence, limited ability to drive and/or safety and the significant potential for overdose.  It is been made clear that these medications are for the prescribed patient only without concomitant use of alcohol or other sepsis unless prescribed by the medical provider.  Has completed prescribing agreement detailing the terms of continue prescribing him a controlled substance.  Including monitoring Kim ports, the possibility of urine drug screens and pedal counts.  The patient is aware that we reviewed KIM reports on a regular basis and scan them into the chart.  History and physical examination exhibited continued safe and appropriate use of controlled substances.  Also discussed the fact that the new [place] legislation allows only a three-day prescription for pain medication.  In this situation he will be referred to a chronic pain clinic.            Patient's Body mass index is 38.06 kg/m². BMI is above normal parameters. Recommendations include: educational material.              This document has been electronically signed by DEVIN ESCOTO MD February 1, 2021 14:39 EST

## 2021-02-01 NOTE — H&P (VIEW-ONLY)
Chief Complaint:          Chief Complaint   Patient presents with   • Nephrolithiasis     CT review       HPI:   50 y.o. male is post a lithotripsy on December 4, 2020 for 14 mm nonobstructing right renal pelvic stone is now down to 7 causing intermittent Pain he is still having significant pain and wants pain medication.  I will set up an additional lithotripsy.  Renal calculus-we discussed the presence of the stone we discussed the various therapeutic options available including percutaneous nephrostolithotomy, lithotripsy.  We discussed the risks of lithotripsy including the passage of stones the development of a large string of stones in the distal ureter known as Steinstrasse.  In the 3% incidence of that we will need to proceed with a ureteroscopy for obstructing fragments.  Extremely rare incidence of renal hematoma.  And the significance of this.  We discussed the absolute relative indicators for intervention including the presence of sepsis, and pain we cannot control is the primary need for urgent intervention.  We discussed placement of a stent if indicated and the management of the stent as well.  ESWL-the patient is a candidate for extracorporeal shockwave lithotripsy.  We discussed the type of stone.  And the complications associated with the procedure including but not limited to pain in the flank, hematoma, spontaneous renal hemorrhage, and adequate fragmentation of stones.  The need for passage of the stones.  The need for concomitant additional procedures in the range of 24% the risk of a distal fragment in the range of 3% requiring ureteroscopic removal        Past Medical History:        Past Medical History:   Diagnosis Date   • Arthritis    • Coronary artery disease    • Diabetes mellitus (CMS/HCC)    • Elevated cholesterol    • Fracture    • GERD (gastroesophageal reflux disease)    • Hyperlipidemia    • Hypertension    • Kidney stones    • Sleep apnea     no c-pap         Current Meds:      Current Outpatient Medications   Medication Sig Dispense Refill   • aspirin 81 MG EC tablet Take 243 mg by mouth Daily. Takes 3 tabs per dose     • atorvastatin (LIPITOR) 20 MG tablet Take 20 mg by mouth Daily.     • Blood Glucose Monitoring Suppl (TRUE METRIX METER) w/Device kit Use as directed to test Blood Sugar. (Patient taking differently: States does not check sugar at home) 1 kit 0   • clopidogrel (PLAVIX) 75 MG tablet Take 75 mg by mouth Daily. LAST DOSE 12/12/19. PER DR TEE     • glucose blood test strip Use as directed to test Blood Sugar 2 times a day. (Patient taking differently: Use as directed to test Blood Sugar 2 times a day.    States does not check sugar at home) 50 each 0   • HYDROcodone-acetaminophen (Norco)  MG per tablet Take 1 tablet by mouth Every 6 (Six) Hours As Needed for Moderate Pain . 12 tablet 0   • metFORMIN ER (GLUCOPHAGE-XR) 500 MG 24 hr tablet Take 1,000 mg by mouth 2 (two) times a day.     • metoprolol succinate XL (TOPROL-XL) 50 MG 24 hr tablet Take 1 tablet by mouth Daily.     • naproxen (NAPROSYN) 500 MG tablet Take 500 mg by mouth Daily.     • Ozempic, 0.25 or 0.5 MG/DOSE, 2 MG/1.5ML solution pen-injector Inject  under the skin into the appropriate area as directed 1 (One) Time Per Week.     • TRUEPLUS LANCETS 28G misc Use as Directed to test Blood Sugar 2 times a day (Patient taking differently: States does not check sugar at home) 50 each 0     No current facility-administered medications for this visit.         Allergies:      Allergies   Allergen Reactions   • Penicillins Hives        Past Surgical History:     Past Surgical History:   Procedure Laterality Date   • CARDIAC CATHETERIZATION     • COLONOSCOPY     • CORONARY STENT PLACEMENT  2015    x1 stent [place] [facility]   • EXTRACORPOREAL SHOCK WAVE LITHOTRIPSY (ESWL) Right 8/21/2020    Procedure: EXTRACORPOREAL SHOCKWAVE LITHOTRIPSY;  Surgeon: Pablo Crenshaw MD;  Location: [facility];  Service: Urology;   Laterality: Right;   • EXTRACORPOREAL SHOCK WAVE LITHOTRIPSY (ESWL) Right 2020    Procedure: EXTRACORPOREAL SHOCKWAVE LITHOTRIPSY;  Surgeon: Pablo Crenshaw MD;  Location: [facility];  Service: Urology;  Laterality: Right;   • FRACTURE SURGERY Right     orif-ankle   • ORIF FIBULA FRACTURE Right 2019    Procedure: OPEN REDUCTION INTERNAL FIXATION RIGHT DISTAL FIBULA;  Surgeon: Christian Brooks MD;  Location: [facility];  Service: Orthopedics         Social History:     Social History     Socioeconomic History   • Marital status:      Spouse name: Not on file   • Number of children: Not on file   • Years of education: Not on file   • Highest education level: Not on file   Tobacco Use   • Smoking status: Former Smoker     Packs/day: 0.50     Years: 10.00     Pack years: 5.00     Types: Cigarettes     Quit date:      Years since quittin.0   • Smokeless tobacco: Never Used   Substance and Sexual Activity   • Alcohol use: Yes     Comment: social   • Drug use: Never   • Sexual activity: Defer       Family History:     Family History   Problem Relation Age of Onset   • Heart disease Father    • Heart attack Maternal Grandmother        Review of Systems:     Review of Systems   Constitutional: Negative.    HENT: Negative.    Eyes: Negative.    Respiratory: Negative.    Cardiovascular: Negative.    Gastrointestinal: Negative.    Endocrine: Negative.    Genitourinary: Positive for flank pain.   Allergic/Immunologic: Negative.    Neurological: Negative.    Hematological: Negative.    Psychiatric/Behavioral: Negative.        Physical Exam:     Physical Exam  Vitals signs and nursing note reviewed.   Constitutional:       Appearance: He is well-developed.   HENT:      Head: Normocephalic and atraumatic.   Eyes:      Conjunctiva/sclera: Conjunctivae normal.      Pupils: Pupils are equal, round, and reactive to light.   Neck:      Musculoskeletal: Normal range of motion.   Cardiovascular:       Rate and Rhythm: Normal rate and regular rhythm.      Heart sounds: Normal heart sounds.   Pulmonary:      Effort: Pulmonary effort is normal.      Breath sounds: Normal breath sounds.   Abdominal:      General: Bowel sounds are normal.      Palpations: Abdomen is soft.   Musculoskeletal: Normal range of motion.   Skin:     General: Skin is warm and dry.   Neurological:      Mental Status: He is alert and oriented to person, place, and time.      Deep Tendon Reflexes: Reflexes are normal and symmetric.   Psychiatric:         Behavior: Behavior normal.         Thought Content: Thought content normal.         Judgment: Judgment normal.         I have reviewed the following portions of the patient's history: allergies, current medications, past family history, past medical history, past social history, past surgical history, problem list and ROS and confirm it's accurate.      Procedure:       Assessment/Plan:   Renal calculus-we discussed the presence of the stone we discussed the various therapeutic options available including percutaneous nephrostolithotomy, lithotripsy.  We discussed the risks of lithotripsy including the passage of stones the development of a large string of stones in the distal ureter known as Steinstrasse.  In the 3% incidence of that we will need to proceed with a ureteroscopy for obstructing fragments.  Extremely rare incidence of renal hematoma.  And the significance of this.  We discussed the absolute relative indicators for intervention including the presence of sepsis, and pain we cannot control is the primary need for urgent intervention.  We discussed placement of a stent if indicated and the management of the stent as well.  Narcotic pain medication-patient has significant acute pain that I believe would be an indication for the use of narcotic pain medication.  I discussed the significant risks of pain medication and the fact that this will be a short only option and I will give her no  more than a three-day supply of pain medication.  And I will not plan long-term medication and that this will be sent to a pain clinic if at all becomes necessary.  We discussed signing a pain medication agreement and the fact that we're going to run a state KIM review to be sure the patient is not getting pain medication from elsewhere.  If this is the case we will not give pain medication.  As part of the patient's treatment plan of there being prescribed a controlled substance with potential for abuse.  This patient has been wait aware of the appropriate dose of such medications including, the risk for somnolence, limited ability to drive and/or safety and the significant potential for overdose.  It is been made clear that these medications are for the prescribed patient only without concomitant use of alcohol or other sepsis unless prescribed by the medical provider.  Has completed prescribing agreement detailing the terms of continue prescribing him a controlled substance.  Including monitoring Kim ports, the possibility of urine drug screens and pedal counts.  The patient is aware that we reviewed KIM reports on a regular basis and scan them into the chart.  History and physical examination exhibited continued safe and appropriate use of controlled substances.  Also discussed the fact that the new [place] legislation allows only a three-day prescription for pain medication.  In this situation he will be referred to a chronic pain clinic.            Patient's Body mass index is 38.06 kg/m². BMI is above normal parameters. Recommendations include: educational material.              This document has been electronically signed by DEVIN ESCOTO MD February 1, 2021 14:39 EST

## 2021-02-02 RX ORDER — GENTAMICIN SULFATE 80 MG/100ML
80 INJECTION, SOLUTION INTRAVENOUS ONCE
Status: CANCELLED | OUTPATIENT
Start: 2021-02-02 | End: 2021-02-02

## 2021-02-03 DIAGNOSIS — N20.0 RENAL CALCULUS, RIGHT: Primary | ICD-10-CM

## 2021-02-09 ENCOUNTER — TELEPHONE (OUTPATIENT)
Dept: UROLOGY | Facility: CLINIC | Age: 51
End: 2021-02-09

## 2021-02-17 ENCOUNTER — LAB (OUTPATIENT)
Dept: LAB | Facility: HOSPITAL | Age: 51
End: 2021-02-17

## 2021-02-17 ENCOUNTER — APPOINTMENT (OUTPATIENT)
Dept: PREADMISSION TESTING | Facility: HOSPITAL | Age: 51
End: 2021-02-17

## 2021-02-17 ENCOUNTER — OFFICE VISIT (OUTPATIENT)
Dept: CARDIOLOGY | Facility: CLINIC | Age: 51
End: 2021-02-17

## 2021-02-17 VITALS
WEIGHT: 245.4 LBS | BODY MASS INDEX: 38.52 KG/M2 | DIASTOLIC BLOOD PRESSURE: 83 MMHG | RESPIRATION RATE: 16 BRPM | HEART RATE: 60 BPM | SYSTOLIC BLOOD PRESSURE: 125 MMHG | HEIGHT: 67 IN | TEMPERATURE: 97.5 F

## 2021-02-17 DIAGNOSIS — I25.10 ASCVD (ARTERIOSCLEROTIC CARDIOVASCULAR DISEASE): Primary | ICD-10-CM

## 2021-02-17 DIAGNOSIS — E11.9 TYPE 2 DIABETES MELLITUS WITHOUT COMPLICATION, WITHOUT LONG-TERM CURRENT USE OF INSULIN (HCC): ICD-10-CM

## 2021-02-17 DIAGNOSIS — N20.0 RENAL CALCULUS, RIGHT: ICD-10-CM

## 2021-02-17 LAB
ANION GAP SERPL CALCULATED.3IONS-SCNC: 9.3 MMOL/L (ref 5–15)
BUN SERPL-MCNC: 18 MG/DL (ref 6–20)
BUN/CREAT SERPL: 15.3 (ref 7–25)
CALCIUM SPEC-SCNC: 9.8 MG/DL (ref 8.6–10.5)
CHLORIDE SERPL-SCNC: 104 MMOL/L (ref 98–107)
CO2 SERPL-SCNC: 26.7 MMOL/L (ref 22–29)
CREAT SERPL-MCNC: 1.18 MG/DL (ref 0.76–1.27)
DEPRECATED RDW RBC AUTO: 42.7 FL (ref 37–54)
ERYTHROCYTE [DISTWIDTH] IN BLOOD BY AUTOMATED COUNT: 13 % (ref 12.3–15.4)
GFR SERPL CREATININE-BSD FRML MDRD: 65 ML/MIN/1.73
GLUCOSE SERPL-MCNC: 114 MG/DL (ref 65–99)
HCT VFR BLD AUTO: 46.1 % (ref 37.5–51)
HGB BLD-MCNC: 14.8 G/DL (ref 13–17.7)
MCH RBC QN AUTO: 28.8 PG (ref 26.6–33)
MCHC RBC AUTO-ENTMCNC: 32.1 G/DL (ref 31.5–35.7)
MCV RBC AUTO: 89.9 FL (ref 79–97)
PLATELET # BLD AUTO: 266 10*3/MM3 (ref 140–450)
PMV BLD AUTO: 9.3 FL (ref 6–12)
POTASSIUM SERPL-SCNC: 4.6 MMOL/L (ref 3.5–5.2)
RBC # BLD AUTO: 5.13 10*6/MM3 (ref 4.14–5.8)
SARS-COV-2 RNA RESP QL NAA+PROBE: NOT DETECTED
SODIUM SERPL-SCNC: 140 MMOL/L (ref 136–145)
WBC # BLD AUTO: 7.36 10*3/MM3 (ref 3.4–10.8)

## 2021-02-17 PROCEDURE — U0004 COV-19 TEST NON-CDC HGH THRU: HCPCS

## 2021-02-17 PROCEDURE — 93000 ELECTROCARDIOGRAM COMPLETE: CPT | Performed by: PHYSICIAN ASSISTANT

## 2021-02-17 PROCEDURE — 80048 BASIC METABOLIC PNL TOTAL CA: CPT

## 2021-02-17 PROCEDURE — 36415 COLL VENOUS BLD VENIPUNCTURE: CPT

## 2021-02-17 PROCEDURE — 85027 COMPLETE CBC AUTOMATED: CPT

## 2021-02-17 PROCEDURE — C9803 HOPD COVID-19 SPEC COLLECT: HCPCS

## 2021-02-17 PROCEDURE — 99214 OFFICE O/P EST MOD 30 MIN: CPT | Performed by: PHYSICIAN ASSISTANT

## 2021-02-17 NOTE — PROGRESS NOTES
Alis Xie DO  Austin Sneed  1970 02/17/2021    Patient Active Problem List   Diagnosis   • Precordial pain   • Dyspnea on exertion   • ASCVD (arteriosclerotic cardiovascular disease), status post stenting about 3 years ago in [facility].    • Chest pain   • Erectile dysfunction   • Closed nondisplaced fracture of lateral malleolus of right fibula   • Morbidly obese (CMS/HCC)   • Type 2 diabetes mellitus without complication, without long-term current use of insulin (CMS/[facility])   • Displaced fracture of lateral malleolus of right fibula, initial encounter for closed fracture   • Renal calculus, right   • Umbilical hernia without obstruction and without gangrene   • Gross hematuria       Dear Alis Xie DO:    Subjective     History of Present Illness:    Chief Complaint   Patient presents with   • Coronary Artery Disease     6 mos follow   • Med Management     list provided       Austin Sneed is a pleasant 50 y.o. male with a past medical history significant for coronary artery disease with stenting in 2016 in [facility].  Patient also has history of diabetes mellitus, dyslipidemia, and essential hypertension.  He comes in today for routine follow-up.    Patient reports he has been doing well from cardiac standpoint has no complaints with open questions.  Upon further questioning he does deny any chest pains, shortness of breath, palpitations, dizziness, or syncope.  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        Allergies   Allergen Reactions   • Penicillins Hives   :      Current Outpatient Medications:   •  atorvastatin (LIPITOR) 20 MG tablet, Take 20 mg by mouth Daily., Disp: , Rfl:   •  clopidogrel (PLAVIX) 75 MG tablet, Take 75 mg by mouth Daily. LAST DOSE 12/12/19. PER DR TEE, Disp: , Rfl:   •  metFORMIN ER (GLUCOPHAGE-XR) 500 MG 24 hr tablet, Take 1,000 mg by mouth 2 (two) times a day., Disp: , Rfl:   •  metoprolol succinate XL (TOPROL-XL)  "50 MG 24 hr tablet, Take 1 tablet by mouth Daily., Disp: , Rfl:   •  naproxen (NAPROSYN) 500 MG tablet, Take 500 mg by mouth Daily., Disp: , Rfl:   •  Ozempic, 0.25 or 0.5 MG/DOSE, 2 MG/1.5ML solution pen-injector, Inject  under the skin into the appropriate area as directed 1 (One) Time Per Week., Disp: , Rfl:   •  Blood Glucose Monitoring Suppl (TRUE METRIX METER) w/Device kit, Use as directed to test Blood Sugar. (Patient taking differently: States does not check sugar at home), Disp: 1 kit, Rfl: 0  •  glucose blood test strip, Use as directed to test Blood Sugar 2 times a day. (Patient taking differently: Use as directed to test Blood Sugar 2 times a day.  States does not check sugar at home), Disp: 50 each, Rfl: 0  •  TRUEPLUS LANCETS 28G misc, Use as Directed to test Blood Sugar 2 times a day (Patient taking differently: States does not check sugar at home), Disp: 50 each, Rfl: 0    The following portions of the patient's history were reviewed and updated as appropriate: allergies, current medications, past family history, past medical history, past social history, past surgical history and problem list.    Social History     Tobacco Use   • Smoking status: Former Smoker     Packs/day: 0.50     Years: 10.00     Pack years: 5.00     Types: Cigarettes     Quit date:      Years since quittin.1   • Smokeless tobacco: Never Used   Substance Use Topics   • Alcohol use: Yes     Comment: social   • Drug use: Never       Review of Systems   Cardiovascular: Negative for chest pain, leg swelling and palpitations.   Respiratory: Negative for shortness of breath.        Objective   Vitals:    21 1011   BP: 125/83   Pulse: 60   Resp: 16   Temp: 97.5 °F (36.4 °C)   Weight: 111 kg (245 lb 6.4 oz)   Height: 170.2 cm (67\")     Body mass index is 38.44 kg/m².    Constitutional:       General: Not in acute distress.     Appearance: Healthy appearance. Well-developed and not in distress. Not diaphoretic.   Eyes:      " Conjunctiva/sclera: Conjunctivae normal.      Pupils: Pupils are equal, round, and reactive to light.   HENT:      Head: Normocephalic and atraumatic.   Neck:      Vascular: No carotid bruit or JVD.   Pulmonary:      Effort: Pulmonary effort is normal. No respiratory distress.      Breath sounds: Normal breath sounds.   Cardiovascular:      Normal rate. Regular rhythm.   Skin:     General: Skin is cool.   Neurological:      Mental Status: Alert, oriented to person, place, and time and oriented to person, place and time.         Lab Results   Component Value Date     02/17/2021    K 4.6 02/17/2021     02/17/2021    CO2 26.7 02/17/2021    BUN 18 02/17/2021    CREATININE 1.18 02/17/2021    GLUCOSE 114 (H) 02/17/2021    CALCIUM 9.8 02/17/2021    AST 17 12/02/2020    ALT 29 12/02/2020    ALKPHOS 51 12/02/2020     Lab Results   Component Value Date    CKTOTAL 108 08/10/2020     Lab Results   Component Value Date    WBC 7.36 02/17/2021    HGB 14.8 02/17/2021    HCT 46.1 02/17/2021     02/17/2021     No results found for: INR  Lab Results   Component Value Date    MG 2.0 08/10/2020     Lab Results   Component Value Date    TSH 4.170 08/10/2020    PSA 1.540 09/04/2020    TRIG 97 07/20/2019    HDL 35 (L) 07/20/2019    LDL 43 07/20/2019      Lab Results   Component Value Date    BNP <2.0 07/25/2018       During this visit the following were done:  Labs Reviewed [x]    Labs Ordered []    Radiology Reports Reviewed [x]    Radiology Ordered []    PCP Records Reviewed []    Referring Provider Records Reviewed []    ER Records Reviewed []    Hospital Records Reviewed []    History Obtained From Family []    Radiology Images Reviewed []    Other Reviewed []    Records Requested []         ECG 12 Lead    Date/Time: 2/17/2021 10:22 AM  Performed by: Lloyd Escobar PA-C  Authorized by: Lloyd Escobar PA-C   Comparison: compared with previous ECG   Similar to previous ECG  Rhythm: sinus rhythm  ST Segments: ST  segments normal    Clinical impression: normal ECG          Assessment/Plan    Diagnosis Plan   1. ASCVD (arteriosclerotic cardiovascular disease), status post stenting in 2016 in [facility].   Stress Test With Myocardial Perfusion   2. Type 2 diabetes mellitus without complication, without long-term current use of insulin (CMS/[facility])          Recommendations:  1. ASCVD  1. 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  2. Patient has been on aspirin Plavix since his stent in 2016 I will stop aspirin today and continue Plavix.  3. Also continue metoprolol.        Return in about 4 weeks (around 3/17/2021).    As always, I appreciate very much the opportunity to participate in the cardiovascular care of your patients.      With Best Regards,    Lloyd Escobar PA-C

## 2021-02-17 NOTE — DISCHARGE INSTRUCTIONS

## 2021-02-18 ENCOUNTER — ANESTHESIA EVENT (OUTPATIENT)
Dept: PERIOP | Facility: HOSPITAL | Age: 51
End: 2021-02-18

## 2021-02-19 ENCOUNTER — HOSPITAL ENCOUNTER (OUTPATIENT)
Facility: HOSPITAL | Age: 51
Discharge: HOME | End: 2021-02-19
Attending: UROLOGY | Admitting: UROLOGY

## 2021-02-19 ENCOUNTER — APPOINTMENT (OUTPATIENT)
Dept: GENERAL RADIOLOGY | Facility: HOSPITAL | Age: 51
End: 2021-02-19

## 2021-02-19 ENCOUNTER — ANESTHESIA (OUTPATIENT)
Dept: PERIOP | Facility: HOSPITAL | Age: 51
End: 2021-02-19

## 2021-02-19 VITALS
TEMPERATURE: 97.8 F | SYSTOLIC BLOOD PRESSURE: 129 MMHG | HEART RATE: 81 BPM | RESPIRATION RATE: 18 BRPM | DIASTOLIC BLOOD PRESSURE: 81 MMHG | BODY MASS INDEX: 37.67 KG/M2 | WEIGHT: 240 LBS | HEIGHT: 67 IN | OXYGEN SATURATION: 98 %

## 2021-02-19 DIAGNOSIS — N20.0 RENAL CALCULUS, RIGHT: ICD-10-CM

## 2021-02-19 LAB
GLUCOSE BLDC GLUCOMTR-MCNC: 105 MG/DL (ref 70–130)
GLUCOSE BLDC GLUCOMTR-MCNC: 121 MG/DL (ref 70–130)

## 2021-02-19 PROCEDURE — 82962 GLUCOSE BLOOD TEST: CPT

## 2021-02-19 PROCEDURE — 25010000002 DEXAMETHASONE PER 1 MG: Performed by: NURSE ANESTHETIST, CERTIFIED REGISTERED

## 2021-02-19 PROCEDURE — 25010000002 KETOROLAC TROMETHAMINE PER 15 MG: Performed by: NURSE ANESTHETIST, CERTIFIED REGISTERED

## 2021-02-19 PROCEDURE — 25010000002 PROPOFOL 10 MG/ML EMULSION: Performed by: NURSE ANESTHETIST, CERTIFIED REGISTERED

## 2021-02-19 PROCEDURE — 74018 RADEX ABDOMEN 1 VIEW: CPT

## 2021-02-19 PROCEDURE — 25010000002 ONDANSETRON PER 1 MG: Performed by: NURSE ANESTHETIST, CERTIFIED REGISTERED

## 2021-02-19 PROCEDURE — 25010000002 FENTANYL CITRATE (PF) 100 MCG/2ML SOLUTION: Performed by: NURSE ANESTHETIST, CERTIFIED REGISTERED

## 2021-02-19 PROCEDURE — 25010000002 GENTAMICIN PER 80 MG: Performed by: UROLOGY

## 2021-02-19 PROCEDURE — 50590 FRAGMENTING OF KIDNEY STONE: CPT | Performed by: UROLOGY

## 2021-02-19 PROCEDURE — 74018 RADEX ABDOMEN 1 VIEW: CPT | Performed by: RADIOLOGY

## 2021-02-19 RX ORDER — SODIUM CHLORIDE, SODIUM LACTATE, POTASSIUM CHLORIDE, CALCIUM CHLORIDE 600; 310; 30; 20 MG/100ML; MG/100ML; MG/100ML; MG/100ML
INJECTION, SOLUTION INTRAVENOUS CONTINUOUS PRN
Status: DISCONTINUED | OUTPATIENT
Start: 2021-02-19 | End: 2021-02-19 | Stop reason: SURG

## 2021-02-19 RX ORDER — GENTAMICIN SULFATE 80 MG/100ML
80 INJECTION, SOLUTION INTRAVENOUS ONCE
Status: COMPLETED | OUTPATIENT
Start: 2021-02-19 | End: 2021-02-19

## 2021-02-19 RX ORDER — SODIUM CHLORIDE 0.9 % (FLUSH) 0.9 %
10 SYRINGE (ML) INJECTION AS NEEDED
Status: DISCONTINUED | OUTPATIENT
Start: 2021-02-19 | End: 2021-02-19 | Stop reason: HOSPADM

## 2021-02-19 RX ORDER — FENTANYL CITRATE 50 UG/ML
INJECTION, SOLUTION INTRAMUSCULAR; INTRAVENOUS AS NEEDED
Status: DISCONTINUED | OUTPATIENT
Start: 2021-02-19 | End: 2021-02-19 | Stop reason: SURG

## 2021-02-19 RX ORDER — LIDOCAINE HYDROCHLORIDE 20 MG/ML
INJECTION, SOLUTION INFILTRATION; PERINEURAL AS NEEDED
Status: DISCONTINUED | OUTPATIENT
Start: 2021-02-19 | End: 2021-02-19 | Stop reason: SURG

## 2021-02-19 RX ORDER — IPRATROPIUM BROMIDE AND ALBUTEROL SULFATE 2.5; .5 MG/3ML; MG/3ML
3 SOLUTION RESPIRATORY (INHALATION) ONCE AS NEEDED
Status: DISCONTINUED | OUTPATIENT
Start: 2021-02-19 | End: 2021-02-19 | Stop reason: HOSPADM

## 2021-02-19 RX ORDER — MEPERIDINE HYDROCHLORIDE 25 MG/ML
12.5 INJECTION INTRAMUSCULAR; INTRAVENOUS; SUBCUTANEOUS
Status: DISCONTINUED | OUTPATIENT
Start: 2021-02-19 | End: 2021-02-19 | Stop reason: HOSPADM

## 2021-02-19 RX ORDER — SODIUM CHLORIDE 0.9 % (FLUSH) 0.9 %
10 SYRINGE (ML) INJECTION EVERY 12 HOURS SCHEDULED
Status: DISCONTINUED | OUTPATIENT
Start: 2021-02-19 | End: 2021-02-19 | Stop reason: HOSPADM

## 2021-02-19 RX ORDER — KETOROLAC TROMETHAMINE 30 MG/ML
INJECTION, SOLUTION INTRAMUSCULAR; INTRAVENOUS AS NEEDED
Status: DISCONTINUED | OUTPATIENT
Start: 2021-02-19 | End: 2021-02-19 | Stop reason: SURG

## 2021-02-19 RX ORDER — OXYCODONE HYDROCHLORIDE AND ACETAMINOPHEN 5; 325 MG/1; MG/1
1 TABLET ORAL ONCE AS NEEDED
Status: DISCONTINUED | OUTPATIENT
Start: 2021-02-19 | End: 2021-02-19 | Stop reason: HOSPADM

## 2021-02-19 RX ORDER — FAMOTIDINE 10 MG/ML
INJECTION, SOLUTION INTRAVENOUS AS NEEDED
Status: DISCONTINUED | OUTPATIENT
Start: 2021-02-19 | End: 2021-02-19 | Stop reason: SURG

## 2021-02-19 RX ORDER — KETOROLAC TROMETHAMINE 30 MG/ML
30 INJECTION, SOLUTION INTRAMUSCULAR; INTRAVENOUS EVERY 6 HOURS PRN
Status: DISCONTINUED | OUTPATIENT
Start: 2021-02-19 | End: 2021-02-19 | Stop reason: HOSPADM

## 2021-02-19 RX ORDER — DROPERIDOL 2.5 MG/ML
0.62 INJECTION, SOLUTION INTRAMUSCULAR; INTRAVENOUS ONCE AS NEEDED
Status: DISCONTINUED | OUTPATIENT
Start: 2021-02-19 | End: 2021-02-19 | Stop reason: HOSPADM

## 2021-02-19 RX ORDER — HYDROCODONE BITARTRATE AND ACETAMINOPHEN 10; 325 MG/1; MG/1
1 TABLET ORAL EVERY 4 HOURS PRN
Qty: 12 TABLET | Refills: 0 | Status: SHIPPED | OUTPATIENT
Start: 2021-02-19 | End: 2021-03-04 | Stop reason: SDUPTHER

## 2021-02-19 RX ORDER — MIDAZOLAM HYDROCHLORIDE 1 MG/ML
2 INJECTION INTRAMUSCULAR; INTRAVENOUS
Status: DISCONTINUED | OUTPATIENT
Start: 2021-02-19 | End: 2021-02-19 | Stop reason: HOSPADM

## 2021-02-19 RX ORDER — DEXAMETHASONE SODIUM PHOSPHATE 4 MG/ML
INJECTION, SOLUTION INTRA-ARTICULAR; INTRALESIONAL; INTRAMUSCULAR; INTRAVENOUS; SOFT TISSUE AS NEEDED
Status: DISCONTINUED | OUTPATIENT
Start: 2021-02-19 | End: 2021-02-19 | Stop reason: SURG

## 2021-02-19 RX ORDER — ONDANSETRON 2 MG/ML
INJECTION INTRAMUSCULAR; INTRAVENOUS AS NEEDED
Status: DISCONTINUED | OUTPATIENT
Start: 2021-02-19 | End: 2021-02-19 | Stop reason: SURG

## 2021-02-19 RX ORDER — SODIUM CHLORIDE, SODIUM LACTATE, POTASSIUM CHLORIDE, CALCIUM CHLORIDE 600; 310; 30; 20 MG/100ML; MG/100ML; MG/100ML; MG/100ML
100 INJECTION, SOLUTION INTRAVENOUS ONCE AS NEEDED
Status: DISCONTINUED | OUTPATIENT
Start: 2021-02-19 | End: 2021-02-19 | Stop reason: HOSPADM

## 2021-02-19 RX ORDER — SODIUM CHLORIDE, SODIUM LACTATE, POTASSIUM CHLORIDE, CALCIUM CHLORIDE 600; 310; 30; 20 MG/100ML; MG/100ML; MG/100ML; MG/100ML
125 INJECTION, SOLUTION INTRAVENOUS ONCE
Status: COMPLETED | OUTPATIENT
Start: 2021-02-19 | End: 2021-02-19

## 2021-02-19 RX ORDER — ONDANSETRON 2 MG/ML
4 INJECTION INTRAMUSCULAR; INTRAVENOUS AS NEEDED
Status: DISCONTINUED | OUTPATIENT
Start: 2021-02-19 | End: 2021-02-19 | Stop reason: HOSPADM

## 2021-02-19 RX ORDER — MIDAZOLAM HYDROCHLORIDE 1 MG/ML
1 INJECTION INTRAMUSCULAR; INTRAVENOUS
Status: DISCONTINUED | OUTPATIENT
Start: 2021-02-19 | End: 2021-02-19 | Stop reason: HOSPADM

## 2021-02-19 RX ORDER — FENTANYL CITRATE 50 UG/ML
50 INJECTION, SOLUTION INTRAMUSCULAR; INTRAVENOUS
Status: DISCONTINUED | OUTPATIENT
Start: 2021-02-19 | End: 2021-02-19 | Stop reason: HOSPADM

## 2021-02-19 RX ORDER — PROPOFOL 10 MG/ML
VIAL (ML) INTRAVENOUS AS NEEDED
Status: DISCONTINUED | OUTPATIENT
Start: 2021-02-19 | End: 2021-02-19 | Stop reason: SURG

## 2021-02-19 RX ADMIN — FENTANYL CITRATE 50 MCG: 50 INJECTION INTRAMUSCULAR; INTRAVENOUS at 08:27

## 2021-02-19 RX ADMIN — GENTAMICIN SULFATE 80 MG: 80 INJECTION, SOLUTION INTRAVENOUS at 08:27

## 2021-02-19 RX ADMIN — KETOROLAC TROMETHAMINE 30 MG: 30 INJECTION, SOLUTION INTRAMUSCULAR at 09:07

## 2021-02-19 RX ADMIN — FAMOTIDINE 20 MG: 10 INJECTION INTRAVENOUS at 08:35

## 2021-02-19 RX ADMIN — FENTANYL CITRATE 50 MCG: 50 INJECTION INTRAMUSCULAR; INTRAVENOUS at 09:06

## 2021-02-19 RX ADMIN — PROPOFOL 180 MG: 10 INJECTION, EMULSION INTRAVENOUS at 08:30

## 2021-02-19 RX ADMIN — LIDOCAINE HYDROCHLORIDE 60 MG: 20 INJECTION, SOLUTION INFILTRATION; PERINEURAL at 08:30

## 2021-02-19 RX ADMIN — SODIUM CHLORIDE, POTASSIUM CHLORIDE, SODIUM LACTATE AND CALCIUM CHLORIDE: 600; 310; 30; 20 INJECTION, SOLUTION INTRAVENOUS at 08:27

## 2021-02-19 RX ADMIN — DEXAMETHASONE SODIUM PHOSPHATE 4 MG: 4 INJECTION, SOLUTION INTRA-ARTICULAR; INTRALESIONAL; INTRAMUSCULAR; INTRAVENOUS; SOFT TISSUE at 08:49

## 2021-02-19 RX ADMIN — SODIUM CHLORIDE, POTASSIUM CHLORIDE, SODIUM LACTATE AND CALCIUM CHLORIDE 125 ML/HR: 600; 310; 30; 20 INJECTION, SOLUTION INTRAVENOUS at 06:56

## 2021-02-19 RX ADMIN — ONDANSETRON 4 MG: 2 INJECTION INTRAMUSCULAR; INTRAVENOUS at 08:35

## 2021-02-19 NOTE — OP NOTE
EXTRACORPOREAL SHOCKWAVE LITHOTRIPSY  Procedure Note    Austin Sneed  2/19/2021    Pre-op Diagnosis:   Renal calculus, right [N20.0]    Post-op Diagnosis:     Post-Op Diagnosis Codes:     * Renal calculus, right [N20.0]    Procedure/CPT® Codes:  50-year-old white male who started with a large 14 mm right renal pelvic stone has a 5 mm fragment causing intermittent pain confirmed by KUB just preoperatively.  Now presents for lithotripsy.  ESWL-the patient is a candidate for extracorporeal shockwave lithotripsy.  We discussed the type of stone.  And the complications associated with the procedure including but not limited to pain in the flank, hematoma, spontaneous renal hemorrhage, and adequate fragmentation of stones.  The need for passage of the stones.  The need for concomitant additional procedures in the range of 24% the risk of a distal fragment in the range of 3% requiring ureteroscopic removal..  Fact that sometimes a stent is indicated based on the size and the density of the stone as determined on the CAT scan.  Following an informed consent he is brought to the operative suite and underwent induction of general endotracheal anesthetic the stone was localized at F2 and a total of 3000 shockwaves administered without complication.  There was excellent fragmentation he was awake and alert return to recovery room          Procedure(s):  EXTRACORPOREAL SHOCKWAVE LITHOTRIPSY    Surgeon(s):  Pablo Crenshaw MD    Anesthesia: see anesthesia record    Staff:   * No surgical staff found *    Estimated Blood Loss: none  Urine Voided: * No values recorded between 2/19/2021  8:27 AM and 2/19/2021  8:50 AM *    Specimens:                None      Drains: None    Findings: Great fragmentation    Blood: N/A    Complications: None    Grafts and Implants: None    Pablo Crenshaw MD     Date: 2/19/2021  Time: 08:50 EST

## 2021-02-19 NOTE — ANESTHESIA POSTPROCEDURE EVALUATION
Patient: Austin Sneed    Procedure Summary     Date: 02/19/21 Room / Location: [facility] OR 08 /  [facility] OR    Anesthesia Start: 0827 Anesthesia Stop: 0907    Procedure: EXTRACORPOREAL SHOCKWAVE LITHOTRIPSY (Right ) Diagnosis:       Renal calculus, right      (Renal calculus, right [N20.0])    Surgeon: Pablo Crenshaw MD Provider: Will Herrera MD    Anesthesia Type: general ASA Status: 3          Anesthesia Type: general    Vitals  Vitals Value Taken Time   /76 02/19/21 0939   Temp 98 °F (36.7 °C) 02/19/21 0910   Pulse 83 02/19/21 0939   Resp 12 02/19/21 0939   SpO2 100 % 02/19/21 0939           Post Anesthesia Care and Evaluation    Patient location during evaluation: PHASE II  Pain score: 2  Pain management: adequate  Airway patency: patent  Anesthetic complications: No anesthetic complications  PONV Status: none  Cardiovascular status: acceptable  Respiratory status: acceptable  Hydration status: acceptable

## 2021-02-19 NOTE — ANESTHESIA PREPROCEDURE EVALUATION
Anesthesia Evaluation     NPO Solid Status: > 8 hours  NPO Liquid Status: > 8 hours           Airway   Mallampati: II  TM distance: >3 FB  Neck ROM: full  No difficulty expected  Dental - normal exam     Pulmonary     breath sounds clear to auscultation  (+) shortness of breath, sleep apnea (no cpap),   Cardiovascular     Rhythm: regular  Rate: normal    (+) hypertension, CAD, hyperlipidemia,       Neuro/Psych  GI/Hepatic/Renal/Endo    (+) obesity, morbid obesity, GERD,  renal disease, diabetes mellitus,     Musculoskeletal     Abdominal     Abdomen: soft.  Bowel sounds: normal.   Substance History      OB/GYN          Other   arthritis,                      Anesthesia Plan    ASA 3     general     intravenous induction     Anesthetic plan, all risks, benefits, and alternatives have been provided, discussed and informed consent has been obtained with: patient.    Plan discussed with CRNA.

## 2021-02-19 NOTE — ANESTHESIA PROCEDURE NOTES
Airway  Urgency: elective    Date/Time: 2/19/2021 8:30 AM  End Time:2/19/2021 8:30 AM  Airway not difficult    General Information and Staff    Patient location during procedure: OR  Anesthesiologist: Will Herrera MD  CRNA: Eun Gore CRNA    Indications and Patient Condition  Indications for airway management: airway protection    Preoxygenated: yes  MILS maintained throughout  Mask difficulty assessment: 1 - vent by mask    Final Airway Details  Final airway type: supraglottic airway      Successful airway: classic  Size 4    Number of attempts at approach: 1  Assessment: lips, teeth, and gum same as pre-op and atraumatic intubation    Additional Comments  AOI X 1 PASS +BBS, +ETCO2, +R//F/C MOUTH TEETH GUMS SAME AS PREOP

## 2021-03-01 ENCOUNTER — HOSPITAL ENCOUNTER (OUTPATIENT)
Dept: NUCLEAR MEDICINE | Facility: HOSPITAL | Age: 51
Discharge: HOME | End: 2021-03-01

## 2021-03-01 ENCOUNTER — HOSPITAL ENCOUNTER (OUTPATIENT)
Dept: CARDIOLOGY | Facility: HOSPITAL | Age: 51
Discharge: HOME | End: 2021-03-01

## 2021-03-01 DIAGNOSIS — I25.10 ASCVD (ARTERIOSCLEROTIC CARDIOVASCULAR DISEASE): ICD-10-CM

## 2021-03-01 LAB
BH CV NUCLEAR PRIOR STUDY: 3
BH CV STRESS BP STAGE 1: NORMAL
BH CV STRESS BP STAGE 2: NORMAL
BH CV STRESS COMMENTS STAGE 1: NORMAL
BH CV STRESS COMMENTS STAGE 2: NORMAL
BH CV STRESS DOSE REGADENOSON STAGE 1: 0.4
BH CV STRESS DURATION MIN STAGE 1: 0
BH CV STRESS DURATION MIN STAGE 2: 4
BH CV STRESS DURATION SEC STAGE 1: 10
BH CV STRESS DURATION SEC STAGE 2: 0
BH CV STRESS HR STAGE 1: 104
BH CV STRESS HR STAGE 2: 107
BH CV STRESS PROTOCOL 1: NORMAL
BH CV STRESS RECOVERY BP: NORMAL MMHG
BH CV STRESS RECOVERY HR: 91 BPM
BH CV STRESS STAGE 1: 1
BH CV STRESS STAGE 2: 2
LV EF NUC BP: 58 %
MAXIMAL PREDICTED HEART RATE: 168 BPM
PERCENT MAX PREDICTED HR: 63.69 %
STRESS BASELINE BP: NORMAL MMHG
STRESS BASELINE HR: 82 BPM
STRESS PERCENT HR: 75 %
STRESS POST PEAK BP: NORMAL MMHG
STRESS POST PEAK HR: 107 BPM
STRESS TARGET HR: 143 BPM

## 2021-03-01 PROCEDURE — 25010000002 REGADENOSON 0.4 MG/5ML SOLUTION: Performed by: PHYSICIAN ASSISTANT

## 2021-03-01 PROCEDURE — 93018 CV STRESS TEST I&R ONLY: CPT | Performed by: INTERNAL MEDICINE

## 2021-03-01 PROCEDURE — 78452 HT MUSCLE IMAGE SPECT MULT: CPT

## 2021-03-01 PROCEDURE — 93017 CV STRESS TEST TRACING ONLY: CPT

## 2021-03-01 PROCEDURE — 0 TECHNETIUM SESTAMIBI: Performed by: PHYSICIAN ASSISTANT

## 2021-03-01 PROCEDURE — 93016 CV STRESS TEST SUPVJ ONLY: CPT | Performed by: INTERNAL MEDICINE

## 2021-03-01 PROCEDURE — A9500 TC99M SESTAMIBI: HCPCS | Performed by: PHYSICIAN ASSISTANT

## 2021-03-01 PROCEDURE — 78452 HT MUSCLE IMAGE SPECT MULT: CPT | Performed by: INTERNAL MEDICINE

## 2021-03-01 RX ADMIN — REGADENOSON 0.4 MG: 0.08 INJECTION, SOLUTION INTRAVENOUS at 12:23

## 2021-03-01 RX ADMIN — TECHNETIUM TC 99M SESTAMIBI 1 DOSE: 1 INJECTION INTRAVENOUS at 12:23

## 2021-03-01 RX ADMIN — TECHNETIUM TC 99M SESTAMIBI 1 DOSE: 1 INJECTION INTRAVENOUS at 10:20

## 2021-03-04 ENCOUNTER — OFFICE VISIT (OUTPATIENT)
Dept: UROLOGY | Facility: CLINIC | Age: 51
End: 2021-03-04

## 2021-03-04 ENCOUNTER — HOSPITAL ENCOUNTER (OUTPATIENT)
Dept: GENERAL RADIOLOGY | Facility: HOSPITAL | Age: 51
Discharge: HOME | End: 2021-03-04
Admitting: UROLOGY

## 2021-03-04 VITALS — HEIGHT: 67 IN | TEMPERATURE: 97.5 F | BODY MASS INDEX: 37.83 KG/M2 | WEIGHT: 241 LBS

## 2021-03-04 DIAGNOSIS — N20.0 RENAL CALCULUS, RIGHT: ICD-10-CM

## 2021-03-04 DIAGNOSIS — N20.1 URETERAL CALCULUS: ICD-10-CM

## 2021-03-04 DIAGNOSIS — N20.0 NEPHROLITHIASIS: Primary | ICD-10-CM

## 2021-03-04 PROCEDURE — 74018 RADEX ABDOMEN 1 VIEW: CPT

## 2021-03-04 PROCEDURE — 74018 RADEX ABDOMEN 1 VIEW: CPT | Performed by: RADIOLOGY

## 2021-03-04 PROCEDURE — 99024 POSTOP FOLLOW-UP VISIT: CPT | Performed by: UROLOGY

## 2021-03-04 RX ORDER — HYDROCODONE BITARTRATE AND ACETAMINOPHEN 10; 325 MG/1; MG/1
1 TABLET ORAL EVERY 4 HOURS PRN
Qty: 12 TABLET | Refills: 0 | Status: SHIPPED | OUTPATIENT
Start: 2021-03-04 | End: 2021-09-29 | Stop reason: ALTCHOICE

## 2021-03-04 NOTE — PROGRESS NOTES
Chief Complaint:          Chief Complaint   Patient presents with   • Nephrolithiasis     Surgery fu        HPI:   50 y.o. male returns today status post lithotripsy on February 19, 2021 he still has occasional left-sided pain.  It wakes him from sleep on occasion.  He does not take a lot of pain medication but occasionally so.  I obtained a KUB showing a mid ureteral 3 mm fragment is the most likely etiology of the pain I refilled his pain medication I will see him back in 1 week we will consider intervention      Past Medical History:        Past Medical History:   Diagnosis Date   • Arthritis    • Coronary artery disease    • Diabetes mellitus (CMS/HCC)    • Elevated cholesterol    • Fracture    • GERD (gastroesophageal reflux disease)    • Hyperlipidemia    • Hypertension    • Kidney stones    • Sleep apnea     no c-pap         Current Meds:     Current Outpatient Medications   Medication Sig Dispense Refill   • atorvastatin (LIPITOR) 20 MG tablet Take 20 mg by mouth Daily.     • Blood Glucose Monitoring Suppl (TRUE METRIX METER) w/Device kit Use as directed to test Blood Sugar. (Patient taking differently: States does not check sugar at home) 1 kit 0   • clopidogrel (PLAVIX) 75 MG tablet Take 75 mg by mouth Daily. LAST DOSE 12/12/19. PER DR TEE     • glucose blood test strip Use as directed to test Blood Sugar 2 times a day. (Patient taking differently: Use as directed to test Blood Sugar 2 times a day.    States does not check sugar at home) 50 each 0   • HYDROcodone-acetaminophen (NORCO)  MG per tablet Take 1 tablet by mouth Every 4 (Four) Hours As Needed for Moderate Pain  (Pain). 12 tablet 0   • metFORMIN ER (GLUCOPHAGE-XR) 500 MG 24 hr tablet Take 1,000 mg by mouth 2 (two) times a day.     • metoprolol succinate XL (TOPROL-XL) 50 MG 24 hr tablet Take 1 tablet by mouth Daily.     • naproxen (NAPROSYN) 500 MG tablet Take 500 mg by mouth Daily.     • Ozempic, 0.25 or 0.5 MG/DOSE, 2 MG/1.5ML solution  pen-injector Inject  under the skin into the appropriate area as directed 1 (One) Time Per Week.     • TRUEPLUS LANCETS 28G misc Use as Directed to test Blood Sugar 2 times a day (Patient taking differently: States does not check sugar at home) 50 each 0     No current facility-administered medications for this visit.         Allergies:      Allergies   Allergen Reactions   • Penicillins Hives        Past Surgical History:     Past Surgical History:   Procedure Laterality Date   • CARDIAC CATHETERIZATION     • COLONOSCOPY     • CORONARY STENT PLACEMENT  2015    x1 stent [place] facility   • EXTRACORPOREAL SHOCK WAVE LITHOTRIPSY (ESWL) Right 2020    Procedure: EXTRACORPOREAL SHOCKWAVE LITHOTRIPSY;  Surgeon: Pablo Crenshaw MD;  Location: [facility];  Service: Urology;  Laterality: Right;   • EXTRACORPOREAL SHOCK WAVE LITHOTRIPSY (ESWL) Right 2020    Procedure: EXTRACORPOREAL SHOCKWAVE LITHOTRIPSY;  Surgeon: Pablo Crenshaw MD;  Location: [facility];  Service: Urology;  Laterality: Right;   • EXTRACORPOREAL SHOCK WAVE LITHOTRIPSY (ESWL) Right 2021    Procedure: EXTRACORPOREAL SHOCKWAVE LITHOTRIPSY;  Surgeon: Pablo Crenshaw MD;  Location: [facility];  Service: Urology;  Laterality: Right;   • FRACTURE SURGERY Right     orif-ankle   • ORIF FIBULA FRACTURE Right 2019    Procedure: OPEN REDUCTION INTERNAL FIXATION RIGHT DISTAL FIBULA;  Surgeon: Christian Brooks MD;  Location: [facility];  Service: Orthopedics         Social History:     Social History     Socioeconomic History   • Marital status:      Spouse name: Not on file   • Number of children: Not on file   • Years of education: Not on file   • Highest education level: Not on file   Tobacco Use   • Smoking status: Former Smoker     Packs/day: 0.50     Years: 10.00     Pack years: 5.00     Types: Cigarettes     Quit date:      Years since quittin.1   • Smokeless tobacco: Never Used   Substance and Sexual  Activity   • Alcohol use: Yes     Comment: social   • Drug use: Never   • Sexual activity: Defer       Family History:     Family History   Problem Relation Age of Onset   • Heart disease Father    • Heart attack Maternal Grandmother        Review of Systems:     Review of Systems   Constitutional: Negative.    HENT: Negative.    Eyes: Negative.    Respiratory: Negative.    Cardiovascular: Negative.    Gastrointestinal: Negative.    Endocrine: Negative.    Musculoskeletal: Negative.    Allergic/Immunologic: Negative.    Neurological: Negative.    Hematological: Negative.    Psychiatric/Behavioral: Negative.        Physical Exam:     Physical Exam  Vitals signs and nursing note reviewed.   Constitutional:       Appearance: He is well-developed.   HENT:      Head: Normocephalic and atraumatic.   Eyes:      Conjunctiva/sclera: Conjunctivae normal.      Pupils: Pupils are equal, round, and reactive to light.   Neck:      Musculoskeletal: Normal range of motion.   Cardiovascular:      Rate and Rhythm: Normal rate and regular rhythm.      Heart sounds: Normal heart sounds.   Pulmonary:      Effort: Pulmonary effort is normal.      Breath sounds: Normal breath sounds.   Abdominal:      General: Bowel sounds are normal.      Palpations: Abdomen is soft.   Musculoskeletal: Normal range of motion.   Skin:     General: Skin is warm and dry.   Neurological:      Mental Status: He is alert and oriented to person, place, and time.      Deep Tendon Reflexes: Reflexes are normal and symmetric.   Psychiatric:         Behavior: Behavior normal.         Thought Content: Thought content normal.         Judgment: Judgment normal.         I have reviewed the following portions of the patient's history: allergies, current medications, past family history, past medical history, past social history, past surgical history, problem list and ROS and confirm it's accurate.      Procedure:       Assessment/Plan:   Ureteral calculus-patient has  been diagnosed with a ureteral calculus.  We have discussed the various parameters regarding spontaneous passage including the notion that a 2 mm stone has a high likelihood of spontaneous passage versus a larger stone being caught up in the upper areas of the urinary tract.  We also discussed the medical management of stone disease and the use of medical expulsive therapy in the form of Flomax.  This is used in an off label setting.  We discussed the indicators for intervention including  absolute indicators such as sepsis and uncontrollable severe pain as well as  the relative indicators of moderate pain that is well-controlled with various analgesia.  I also talked about nonoperative management including ambulation and increasing fluids and hot tub as being an effective adjuncts in the treatment of a ureteral stone.  He has a 3 mm mid ureteral stone after a successful lithotripsy with persistent intermittent pain I Shauna give him a week if it has not passed we can recommend intervention  Narcotic pain medication-patient has significant acute pain that I believe would be an indication for the use of narcotic pain medication.  I discussed the significant risks of pain medication and the fact that this will be a short only option and I will give her no more than a three-day supply of pain medication.  And I will not plan long-term medication and that this will be sent to a pain clinic if at all becomes necessary.  We discussed signing a pain medication agreement and the fact that we're going to run a state [facility] review to be sure the patient is not getting pain medication from elsewhere.  If this is the case we will not give pain medication.  As part of the patient's treatment plan of there being prescribed a controlled substance with potential for abuse.  This patient has been wait aware of the appropriate dose of such medications including, the risk for somnolence, limited ability to drive and/or safety and the  significant potential for overdose.  It is been made clear that these medications are for the prescribed patient only without concomitant use of alcohol or other sepsis unless prescribed by the medical provider.  Has completed prescribing agreement detailing the terms of continue prescribing him a controlled substance.  Including monitoring Kim ports, the possibility of urine drug screens and pedal counts.  The patient is aware that we reviewed KIM reports on a regular basis and scan them into the chart.  History and physical examination exhibited continued safe and appropriate use of controlled substances.  Also discussed the fact that the new [place] legislation allows only a three-day prescription for pain medication.  In this situation he will be referred to a chronic pain clinic.            Patient's Body mass index is 37.75 kg/m². BMI is above normal parameters. Recommendations include: educational material.              This document has been electronically signed by DEVIN ESCOTO MD March 4, 2021 08:03 EST

## 2021-03-05 PROBLEM — N20.1 URETERAL CALCULUS: Status: ACTIVE | Noted: 2021-03-05

## 2021-03-11 ENCOUNTER — OFFICE VISIT (OUTPATIENT)
Dept: UROLOGY | Facility: CLINIC | Age: 51
End: 2021-03-11

## 2021-03-11 ENCOUNTER — HOSPITAL ENCOUNTER (OUTPATIENT)
Dept: GENERAL RADIOLOGY | Facility: HOSPITAL | Age: 51
Discharge: HOME | End: 2021-03-11
Admitting: UROLOGY

## 2021-03-11 VITALS — HEIGHT: 67 IN | WEIGHT: 244 LBS | TEMPERATURE: 97.8 F | BODY MASS INDEX: 38.3 KG/M2

## 2021-03-11 DIAGNOSIS — N20.0 RENAL CALCULUS, RIGHT: ICD-10-CM

## 2021-03-11 DIAGNOSIS — N20.0 NEPHROLITHIASIS: Primary | ICD-10-CM

## 2021-03-11 PROCEDURE — 99024 POSTOP FOLLOW-UP VISIT: CPT | Performed by: UROLOGY

## 2021-03-11 PROCEDURE — 74018 RADEX ABDOMEN 1 VIEW: CPT

## 2021-03-11 PROCEDURE — 74018 RADEX ABDOMEN 1 VIEW: CPT | Performed by: RADIOLOGY

## 2021-03-11 NOTE — PROGRESS NOTES
Chief Complaint:          Chief Complaint   Patient presents with   • Nephrolithiasis     1 wk f/u       HPI:   50 y.o. male that is post lithotripsy doing great no pain no problems KUB negative I will see him back on an as-needed basis      Past Medical History:        Past Medical History:   Diagnosis Date   • Arthritis    • Coronary artery disease    • Diabetes mellitus (CMS/HCC)    • Elevated cholesterol    • Fracture    • GERD (gastroesophageal reflux disease)    • Hyperlipidemia    • Hypertension    • Kidney stones    • Sleep apnea     no c-pap         Current Meds:     Current Outpatient Medications   Medication Sig Dispense Refill   • atorvastatin (LIPITOR) 20 MG tablet Take 20 mg by mouth Daily.     • Blood Glucose Monitoring Suppl (TRUE METRIX METER) w/Device kit Use as directed to test Blood Sugar. (Patient taking differently: States does not check sugar at home) 1 kit 0   • clopidogrel (PLAVIX) 75 MG tablet Take 75 mg by mouth Daily. LAST DOSE 12/12/19. PER DR TEE     • glucose blood test strip Use as directed to test Blood Sugar 2 times a day. (Patient taking differently: Use as directed to test Blood Sugar 2 times a day.    States does not check sugar at home) 50 each 0   • HYDROcodone-acetaminophen (NORCO)  MG per tablet Take 1 tablet by mouth Every 4 (Four) Hours As Needed for Moderate Pain  (Pain). 12 tablet 0   • metFORMIN ER (GLUCOPHAGE-XR) 500 MG 24 hr tablet Take 1,000 mg by mouth 2 (two) times a day.     • metoprolol succinate XL (TOPROL-XL) 50 MG 24 hr tablet Take 1 tablet by mouth Daily.     • naproxen (NAPROSYN) 500 MG tablet Take 500 mg by mouth Daily.     • Ozempic, 0.25 or 0.5 MG/DOSE, 2 MG/1.5ML solution pen-injector Inject  under the skin into the appropriate area as directed 1 (One) Time Per Week.     • TRUEPLUS LANCETS 28G misc Use as Directed to test Blood Sugar 2 times a day (Patient taking differently: States does not check sugar at home) 50 each 0     No current  facility-administered medications for this visit.        Allergies:      Allergies   Allergen Reactions   • Penicillins Hives        Past Surgical History:     Past Surgical History:   Procedure Laterality Date   • CARDIAC CATHETERIZATION     • COLONOSCOPY     • CORONARY STENT PLACEMENT  2015    x1 stent [place]   • EXTRACORPOREAL SHOCK WAVE LITHOTRIPSY (ESWL) Right 2020    Procedure: EXTRACORPOREAL SHOCKWAVE LITHOTRIPSY;  Surgeon: Pablo Crenshaw MD;  Location: [facility];  Service: Urology;  Laterality: Right;   • EXTRACORPOREAL SHOCK WAVE LITHOTRIPSY (ESWL) Right 2020    Procedure: EXTRACORPOREAL SHOCKWAVE LITHOTRIPSY;  Surgeon: Pablo Crenshaw MD;  Location: [facility];  Service: Urology;  Laterality: Right;   • EXTRACORPOREAL SHOCK WAVE LITHOTRIPSY (ESWL) Right 2021    Procedure: EXTRACORPOREAL SHOCKWAVE LITHOTRIPSY;  Surgeon: Pablo Crenshaw MD;  Location: [facility];  Service: Urology;  Laterality: Right;   • FRACTURE SURGERY Right     orif-ankle   • ORIF FIBULA FRACTURE Right 2019    Procedure: OPEN REDUCTION INTERNAL FIXATION RIGHT DISTAL FIBULA;  Surgeon: Christian Brooks MD;  Location: [facility];  Service: Orthopedics         Social History:     Social History     Socioeconomic History   • Marital status:      Spouse name: Not on file   • Number of children: Not on file   • Years of education: Not on file   • Highest education level: Not on file   Tobacco Use   • Smoking status: Former Smoker     Packs/day: 0.50     Years: 10.00     Pack years: 5.00     Types: Cigarettes     Quit date:      Years since quittin.1   • Smokeless tobacco: Never Used   Vaping Use   • Vaping Use: Never used   Substance and Sexual Activity   • Alcohol use: Yes     Comment: social   • Drug use: Never   • Sexual activity: Defer       Family History:     Family History   Problem Relation Age of Onset   • Heart disease Father    • Heart attack Maternal Grandmother         Review of Systems:     Review of Systems   Constitutional: Negative.    HENT: Negative.    Eyes: Negative.    Respiratory: Negative.    Cardiovascular: Negative.    Gastrointestinal: Negative.    Endocrine: Negative.    Musculoskeletal: Negative.    Allergic/Immunologic: Negative.    Neurological: Negative.    Hematological: Negative.    Psychiatric/Behavioral: Negative.        Physical Exam:     Physical Exam  Vitals and nursing note reviewed.   Constitutional:       Appearance: He is well-developed.   HENT:      Head: Normocephalic and atraumatic.   Eyes:      Conjunctiva/sclera: Conjunctivae normal.      Pupils: Pupils are equal, round, and reactive to light.   Cardiovascular:      Rate and Rhythm: Normal rate and regular rhythm.      Heart sounds: Normal heart sounds.   Pulmonary:      Effort: Pulmonary effort is normal.      Breath sounds: Normal breath sounds.   Abdominal:      General: Bowel sounds are normal.      Palpations: Abdomen is soft.   Musculoskeletal:         General: Normal range of motion.      Cervical back: Normal range of motion.   Skin:     General: Skin is warm and dry.   Neurological:      Mental Status: He is alert and oriented to person, place, and time.      Deep Tendon Reflexes: Reflexes are normal and symmetric.   Psychiatric:         Behavior: Behavior normal.         Thought Content: Thought content normal.         Judgment: Judgment normal.         I have reviewed the following portions of the patient's history: allergies, current medications, past family history, past medical history, past social history, past surgical history, problem list and ROS and confirm it's accurate.      Procedure:       Assessment/Plan:   Renal calculus-we discussed the presence of the stone we discussed the various therapeutic options available including percutaneous nephrostolithotomy, lithotripsy.  We discussed the risks of lithotripsy including the passage of stones the development of a large  string of stones in the distal ureter known as Steinstrasse.  In the 3% incidence of that we will need to proceed with a ureteroscopy for obstructing fragments.  Extremely rare incidence of renal hematoma.  And the significance of this.  We discussed the absolute relative indicators for intervention including the presence of sepsis, and pain we cannot control is the primary need for urgent intervention.  We discussed placement of a stent if indicated and the management of the stent as well.  He is now stone free as viewed on a plain KUB this was a very successful lithotripsy            Patient's Body mass index is 37.75 kg/m². BMI is above normal parameters. Recommendations include: educational material.              This document has been electronically signed by DEVIN ESCOTO MD March 11, 2021 13:22 EST

## 2021-04-05 ENCOUNTER — TRANSCRIBE ORDERS (OUTPATIENT)
Dept: ADMINISTRATIVE | Facility: HOSPITAL | Age: 51
End: 2021-04-05

## 2021-04-05 ENCOUNTER — HOSPITAL ENCOUNTER (OUTPATIENT)
Dept: GENERAL RADIOLOGY | Facility: HOSPITAL | Age: 51
Discharge: HOME | End: 2021-04-05
Admitting: PHYSICIAN ASSISTANT

## 2021-04-05 DIAGNOSIS — M79.641 RIGHT HAND PAIN: ICD-10-CM

## 2021-04-05 DIAGNOSIS — M79.641 RIGHT HAND PAIN: Primary | ICD-10-CM

## 2021-04-05 PROCEDURE — 73120 X-RAY EXAM OF HAND: CPT | Performed by: RADIOLOGY

## 2021-04-05 PROCEDURE — 73120 X-RAY EXAM OF HAND: CPT

## 2021-05-26 ENCOUNTER — HOSPITAL ENCOUNTER (OUTPATIENT)
Dept: GENERAL RADIOLOGY | Facility: HOSPITAL | Age: 51
Discharge: HOME | End: 2021-05-26
Admitting: ORTHOPAEDIC SURGERY

## 2021-05-26 ENCOUNTER — OFFICE VISIT (OUTPATIENT)
Dept: ORTHOPEDIC SURGERY | Facility: CLINIC | Age: 51
End: 2021-05-26

## 2021-05-26 VITALS — WEIGHT: 244.71 LBS | HEIGHT: 67 IN | TEMPERATURE: 98.8 F | BODY MASS INDEX: 38.41 KG/M2

## 2021-05-26 DIAGNOSIS — M25.512 ACUTE PAIN OF LEFT SHOULDER: Primary | ICD-10-CM

## 2021-05-26 DIAGNOSIS — M75.82 ROTATOR CUFF TENDINITIS, LEFT: ICD-10-CM

## 2021-05-26 DIAGNOSIS — M19.012 OSTEOARTHRITIS OF LEFT AC (ACROMIOCLAVICULAR) JOINT: ICD-10-CM

## 2021-05-26 PROCEDURE — 73030 X-RAY EXAM OF SHOULDER: CPT

## 2021-05-26 PROCEDURE — 20610 DRAIN/INJ JOINT/BURSA W/O US: CPT | Performed by: ORTHOPAEDIC SURGERY

## 2021-05-26 PROCEDURE — 73030 X-RAY EXAM OF SHOULDER: CPT | Performed by: RADIOLOGY

## 2021-05-26 PROCEDURE — 99213 OFFICE O/P EST LOW 20 MIN: CPT | Performed by: ORTHOPAEDIC SURGERY

## 2021-05-26 RX ADMIN — LIDOCAINE HYDROCHLORIDE 5 ML: 20 INJECTION, SOLUTION INFILTRATION; PERINEURAL at 12:14

## 2021-05-26 RX ADMIN — METHYLPREDNISOLONE ACETATE 80 MG: 80 INJECTION, SUSPENSION INTRA-ARTICULAR; INTRALESIONAL; INTRAMUSCULAR; SOFT TISSUE at 12:14

## 2021-05-26 NOTE — PROGRESS NOTES
Established New Problem         Patient: Austin Sneed  YOB: 1970  Date of Encounter: 05/26/2021      Chief  Complaint:   Chief Complaint   Patient presents with   • Right Hand - Initial Evaluation   • Left Shoulder - Pain         HPI:  Austin Sneed, 50 y.o. male presents for evaluation of right wrist cyst sole aspect.  He developed a cyst about a month or 2 ago and since then had has resolved.  Has no residual complaints of pain in his wrist.  Second complaint is left shoulder pain for months duration.  He localizes pain to the superior aspect of his shoulder lateral aspect of his arm some difficulty sleeping at night.  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      Medical History:  Patient Active Problem List   Diagnosis   • Precordial pain   • Dyspnea on exertion   • ASCVD (arteriosclerotic cardiovascular disease), status post stenting about 3 years ago in [facility].    • Chest pain   • Erectile dysfunction   • Closed nondisplaced fracture of lateral malleolus of right fibula   • Morbidly obese (CMS/HCC)   • Type 2 diabetes mellitus without complication, without long-term current use of insulin (CMS/[facility])   • Displaced fracture of lateral malleolus of right fibula, initial encounter for closed fracture   • Renal calculus, right   • Umbilical hernia without obstruction and without gangrene   • Gross hematuria   • Ureteral calculus   • Acute pain of left shoulder   • Osteoarthritis of left AC (acromioclavicular) joint   • Rotator cuff tendinitis, left     Past Medical History:   Diagnosis Date   • Arthritis    • Coronary artery disease    • Diabetes mellitus (CMS/HCC)    • Elevated cholesterol    • Fracture    • GERD (gastroesophageal reflux disease)    • Hyperlipidemia    • Hypertension    • Kidney stones    • Sleep apnea     no c-pap         Social History:  Social History     Socioeconomic History   • Marital status:      Spouse name: Not on file   • Number of children: Not on file   •  Years of education: Not on file   • Highest education level: Not on file   Tobacco Use   • Smoking status: Former Smoker     Packs/day: 0.50     Years: 10.00     Pack years: 5.00     Types: Cigarettes     Quit date:      Years since quittin.4   • Smokeless tobacco: Never Used   Vaping Use   • Vaping Use: Never used   Substance and Sexual Activity   • Alcohol use: Yes     Comment: social   • Drug use: Never   • Sexual activity: Defer         Current Medications:    Current Outpatient Medications:   •  atorvastatin (LIPITOR) 20 MG tablet, Take 20 mg by mouth Daily., Disp: , Rfl:   •  Blood Glucose Monitoring Suppl (TRUE METRIX METER) w/Device kit, Use as directed to test Blood Sugar. (Patient taking differently: States does not check sugar at home), Disp: 1 kit, Rfl: 0  •  clopidogrel (PLAVIX) 75 MG tablet, Take 75 mg by mouth Daily. LAST DOSE 19. PER DR TEE, Disp: , Rfl:   •  glucose blood test strip, Use as directed to test Blood Sugar 2 times a day. (Patient taking differently: Use as directed to test Blood Sugar 2 times a day.  States does not check sugar at home), Disp: 50 each, Rfl: 0  •  metFORMIN ER (GLUCOPHAGE-XR) 500 MG 24 hr tablet, Take 1,000 mg by mouth 2 (two) times a day., Disp: , Rfl:   •  metoprolol succinate XL (TOPROL-XL) 50 MG 24 hr tablet, Take 1 tablet by mouth Daily., Disp: , Rfl:   •  Ozempic, 0.25 or 0.5 MG/DOSE, 2 MG/1.5ML solution pen-injector, Inject  under the skin into the appropriate area as directed 1 (One) Time Per Week., Disp: , Rfl:   •  TRUEPLUS LANCETS 28G misc, Use as Directed to test Blood Sugar 2 times a day (Patient taking differently: States does not check sugar at home), Disp: 50 each, Rfl: 0  •  HYDROcodone-acetaminophen (NORCO)  MG per tablet, Take 1 tablet by mouth Every 4 (Four) Hours As Needed for Moderate Pain  (Pain)., Disp: 12 tablet, Rfl: 0  •  naproxen (NAPROSYN) 500 MG tablet, Take 500 mg by mouth Daily., Disp: , Rfl:        Allergies:  Allergies   Allergen Reactions   • Penicillins Hives         Family History:  Family History   Problem Relation Age of Onset   • Heart disease Father    • Heart attack Maternal Grandmother          Surgical History:  Past Surgical History:   Procedure Laterality Date   • CARDIAC CATHETERIZATION     • COLONOSCOPY     • CORONARY STENT PLACEMENT  2015    x1 stent [place] facility   • EXTRACORPOREAL SHOCK WAVE LITHOTRIPSY (ESWL) Right 8/21/2020    Procedure: EXTRACORPOREAL SHOCKWAVE LITHOTRIPSY;  Surgeon: Pablo Crenshaw MD;  Location: [facility] OR;  Service: Urology;  Laterality: Right;   • EXTRACORPOREAL SHOCK WAVE LITHOTRIPSY (ESWL) Right 12/4/2020    Procedure: EXTRACORPOREAL SHOCKWAVE LITHOTRIPSY;  Surgeon: Pablo Crenshaw MD;  Location: [facility] OR;  Service: Urology;  Laterality: Right;   • EXTRACORPOREAL SHOCK WAVE LITHOTRIPSY (ESWL) Right 2/19/2021    Procedure: EXTRACORPOREAL SHOCKWAVE LITHOTRIPSY;  Surgeon: Pablo Crenshaw MD;  Location: [facility] OR;  Service: Urology;  Laterality: Right;   • FRACTURE SURGERY Right     orif-ankle   • ORIF FIBULA FRACTURE Right 12/17/2019    Procedure: OPEN REDUCTION INTERNAL FIXATION RIGHT DISTAL FIBULA;  Surgeon: Christian Brooks MD;  Location: [facility] OR;  Service: Orthopedics         Radiology:   XR Shoulder 2+ View Left    Result Date: 5/26/2021    No acute findings in the left shoulder.  This report was finalized on 5/26/2021 10:22 AM by Dr. Buddy Nelson MD.      Radiographs of left shoulder show moderate osteoarthritis of the acromioclavicular joint.  There is mild irregularity superior aspect of the greater tuberosity otherwise negative glenohumeral joint unremarkable with normal subacromial space.    Examination:   Examination left shoulder reveals no contour.  He has moderate impingement with Neer's and Kathleen maneuvers good strength with Jobes maneuver with minimal discomfort.  He also has mild to moderate pain with crossarm  adduction and is mildly tender over his left acromioclavicular joint.  Has full internal and external rotation with good pushoff.  Neurovascular exam is grossly intact.        Assessment & Plan:   50 y.o. male presents with resolved right dorsal ganglion cyst.  With his second complaint of left shoulder pain of about 2 months duration since findings are consistent with rotator cuff tendinitis versus early osteoarthritis of the acromioclavicular joint.  We discussed his options he was then provided subacromial steroid injection Depo-Medrol 80 mg lidocaine block.  10 minutes following the injection he had significant relief.  He is invited to return in the future as symptoms warrant.  If he has short-lived relief with steroid injection subacromial space left shoulder he may benefit from steroid injections of the left AC joint.         Diagnosis Plan   1. Acute pain of left shoulder  XR Shoulder 2+ View Left   2. Rotator cuff tendinitis, left     3. Osteoarthritis of left AC (acromioclavicular) joint           Large Joint Arthrocentesis: L subacromial bursa  Date/Time: 5/26/2021 12:14 PM  Consent given by: patient  Site marked: site marked  Timeout: Immediately prior to procedure a time out was called to verify the correct patient, procedure, equipment, support staff and site/side marked as required   Supporting Documentation  Indications: pain   Procedure Details  Location: shoulder - L subacromial bursa  Preparation: Patient was prepped and draped in the usual sterile fashion  Needle size: 25 G  Approach: lateral  Medications administered: 5 mL lidocaine 2%; 80 mg methylPREDNISolone acetate 80 MG/ML  Patient tolerance: patient tolerated the procedure well with no immediate complications              Cc:  Alis Xie DO

## 2021-05-29 PROBLEM — M75.82 ROTATOR CUFF TENDINITIS, LEFT: Status: ACTIVE | Noted: 2021-05-29

## 2021-05-29 PROBLEM — M19.012 OSTEOARTHRITIS OF LEFT AC (ACROMIOCLAVICULAR) JOINT: Status: ACTIVE | Noted: 2021-05-29

## 2021-05-29 RX ORDER — LIDOCAINE HYDROCHLORIDE 20 MG/ML
5 INJECTION, SOLUTION INFILTRATION; PERINEURAL
Status: COMPLETED | OUTPATIENT
Start: 2021-05-26 | End: 2021-05-26

## 2021-05-29 RX ORDER — METHYLPREDNISOLONE ACETATE 80 MG/ML
80 INJECTION, SUSPENSION INTRA-ARTICULAR; INTRALESIONAL; INTRAMUSCULAR; SOFT TISSUE
Status: COMPLETED | OUTPATIENT
Start: 2021-05-26 | End: 2021-05-26

## 2021-07-30 ENCOUNTER — TELEPHONE (OUTPATIENT)
Dept: ORTHOPEDIC SURGERY | Facility: CLINIC | Age: 51
End: 2021-07-30

## 2021-07-30 NOTE — TELEPHONE ENCOUNTER
Caller: CHICO    Relationship to patient: WIFE    Best call back number: [phone]    Chief complaint: RIGHT SHOULDER PAIN     Type of visit: NEW PROBLEM     Requested date: ASAP     If rescheduling, when is the original appointment:     Additional notes: FIRST AVAILABLE IS 9/1/21 - THE PAIN HAS BEEN THERE BUT IT IS GETTING WORSE AND WANTED DR BLAKELY TO LOOK AT IT AND GIVE HIM A POSSIBLE INJECTION (HE HAD ONE IN HIS LEFT SHOULDER IN MAY ) NO SURGERY IN RIGHT SHOULDER OR RECENT IMAGING

## 2021-08-03 NOTE — TELEPHONE ENCOUNTER
Caller: CHICO   Relationship to Patient: WIFE    Phone Number: [phone]  Reason for Call: PATIENTS WIFE CALLING BACK ABOUT SCHEDULING

## 2021-08-08 ENCOUNTER — APPOINTMENT (OUTPATIENT)
Dept: CT IMAGING | Facility: HOSPITAL | Age: 51
End: 2021-08-08

## 2021-08-08 ENCOUNTER — HOSPITAL ENCOUNTER (EMERGENCY)
Facility: HOSPITAL | Age: 51
Discharge: HOME | End: 2021-08-08
Attending: FAMILY MEDICINE | Admitting: FAMILY MEDICINE

## 2021-08-08 ENCOUNTER — APPOINTMENT (OUTPATIENT)
Dept: GENERAL RADIOLOGY | Facility: HOSPITAL | Age: 51
End: 2021-08-08

## 2021-08-08 VITALS
BODY MASS INDEX: 36.88 KG/M2 | WEIGHT: 235 LBS | OXYGEN SATURATION: 97 % | DIASTOLIC BLOOD PRESSURE: 68 MMHG | SYSTOLIC BLOOD PRESSURE: 139 MMHG | HEART RATE: 113 BPM | TEMPERATURE: 98.7 F | RESPIRATION RATE: 17 BRPM | HEIGHT: 67 IN

## 2021-08-08 DIAGNOSIS — S39.012A STRAIN OF LUMBAR REGION, INITIAL ENCOUNTER: ICD-10-CM

## 2021-08-08 DIAGNOSIS — S46.912A STRAIN OF LEFT SHOULDER, INITIAL ENCOUNTER: ICD-10-CM
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
## 2021-08-08 PROCEDURE — 73060 X-RAY EXAM OF HUMERUS: CPT | Performed by: RADIOLOGY

## 2021-08-08 PROCEDURE — 72131 CT LUMBAR SPINE W/O DYE: CPT | Performed by: RADIOLOGY

## 2021-08-08 PROCEDURE — 99283 EMERGENCY DEPT VISIT LOW MDM: CPT

## 2021-08-08 PROCEDURE — 72125 CT NECK SPINE W/O DYE: CPT

## 2021-08-08 PROCEDURE — 73030 X-RAY EXAM OF SHOULDER: CPT

## 2021-08-08 PROCEDURE — 25010000002 ORPHENADRINE CITRATE PER 60 MG: Performed by: PHYSICIAN ASSISTANT

## 2021-08-08 PROCEDURE — 72128 CT CHEST SPINE W/O DYE: CPT | Performed by: RADIOLOGY

## 2021-08-08 PROCEDURE — 72131 CT LUMBAR SPINE W/O DYE: CPT

## 2021-08-08 PROCEDURE — 25010000002 KETOROLAC TROMETHAMINE PER 15 MG: Performed by: PHYSICIAN ASSISTANT

## 2021-08-08 PROCEDURE — 73030 X-RAY EXAM OF SHOULDER: CPT | Performed by: RADIOLOGY

## 2021-08-08 PROCEDURE — 72128 CT CHEST SPINE W/O DYE: CPT

## 2021-08-08 PROCEDURE — 96372 THER/PROPH/DIAG INJ SC/IM: CPT

## 2021-08-08 PROCEDURE — 72125 CT NECK SPINE W/O DYE: CPT | Performed by: RADIOLOGY

## 2021-08-08 PROCEDURE — 73060 X-RAY EXAM OF HUMERUS: CPT

## 2021-08-08 RX ORDER — CYCLOBENZAPRINE HCL 10 MG
10 TABLET ORAL 3 TIMES DAILY PRN
Qty: 15 TABLET | Refills: 0 | Status: SHIPPED | OUTPATIENT
Start: 2021-08-08 | End: 2021-10-06

## 2021-08-08 RX ORDER — ORPHENADRINE CITRATE 30 MG/ML
60 INJECTION INTRAMUSCULAR; INTRAVENOUS ONCE
Status: COMPLETED | OUTPATIENT
Start: 2021-08-08 | End: 2021-08-08

## 2021-08-08 RX ORDER — KETOROLAC TROMETHAMINE 30 MG/ML
60 INJECTION, SOLUTION INTRAMUSCULAR; INTRAVENOUS ONCE
Status: COMPLETED | OUTPATIENT
Start: 2021-08-08 | End: 2021-08-08

## 2021-08-08 RX ADMIN — ORPHENADRINE CITRATE 60 MG: 30 INJECTION INTRAMUSCULAR; INTRAVENOUS at 13:26

## 2021-08-08 RX ADMIN — KETOROLAC TROMETHAMINE 60 MG: 60 INJECTION, SOLUTION INTRAMUSCULAR at 13:26

## 2021-08-08 NOTE — DISCHARGE INSTRUCTIONS
Use ice to painful areas for the next 2 days then you can alternate ice and heat.  A prescription for muscle relaxer has been prescribed for you.  Continue your home medications as prescribed.  Follow-up with your family doctor at the next available appointment.  Return to the ED if your symptoms change or worsen.
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
## 2021-08-10 DIAGNOSIS — M25.511 RIGHT SHOULDER PAIN, UNSPECIFIED CHRONICITY: Primary | ICD-10-CM

## 2021-08-18 ENCOUNTER — APPOINTMENT (OUTPATIENT)
Dept: GENERAL RADIOLOGY | Facility: HOSPITAL | Age: 51
End: 2021-08-18

## 2021-08-18 ENCOUNTER — OFFICE VISIT (OUTPATIENT)
Dept: ORTHOPEDIC SURGERY | Facility: CLINIC | Age: 51
End: 2021-08-18

## 2021-08-18 VITALS — BODY MASS INDEX: 37.02 KG/M2 | WEIGHT: 235.89 LBS | HEIGHT: 67 IN

## 2021-08-18 DIAGNOSIS — S46.912A STRAIN OF LEFT SHOULDER, INITIAL ENCOUNTER: ICD-10-CM
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
## 2021-08-18 PROCEDURE — 99213 OFFICE O/P EST LOW 20 MIN: CPT | Performed by: ORTHOPAEDIC SURGERY
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
## 2021-08-18 NOTE — PROGRESS NOTES
Follow-up Visit         Patient: Austin Sneed  YOB: 1970  Date of Encounter: 2021      Chief  Complaint:   Chief Complaint   Patient presents with   • Left Shoulder - Pain         HPI:  Ausitn Sneed, 50 y.o. male presents      Medical History:  Patient Active Problem List   Diagnosis   • Precordial pain   • Dyspnea on exertion   • ASCVD (arteriosclerotic cardiovascular disease), status post stenting about 3 years ago in [facility].    • Chest pain   • Erectile dysfunction   • Closed nondisplaced fracture of lateral malleolus of right fibula   • Morbidly obese (CMS/formerly [facility])   • Type 2 diabetes mellitus without complication, without long-term current use of insulin (CMS/formerly [facility])   • Displaced fracture of lateral malleolus of right fibula, initial encounter for closed fracture   • Renal calculus, right   • Umbilical hernia without obstruction and without gangrene   • Gross hematuria   • Ureteral calculus   • Acute pain of left shoulder   • Osteoarthritis of left AC (acromioclavicular) joint   • Rotator cuff tendinitis, left     Past Medical History:   Diagnosis Date   • Arthritis    • Coronary artery disease    • Diabetes mellitus (CMS/formerly [facility])    • Elevated cholesterol    • Fracture    • GERD (gastroesophageal reflux disease)    • Hyperlipidemia    • Hypertension    • Kidney stones    • Sleep apnea     no c-pap         Social History:  Social History     Socioeconomic History   • Marital status:      Spouse name: Not on file   • Number of children: Not on file   • Years of education: Not on file   • Highest education level: Not on file   Tobacco Use   • Smoking status: Former Smoker     Packs/day: 0.50     Years: 10.00     Pack years: 5.00     Types: Cigarettes     Quit date:      Years since quittin.6   • Smokeless tobacco: Never Used   Vaping Use   • Vaping Use: Never used   Substance and Sexual Activity   • Alcohol use: Yes     Comment: social   • Drug use: Never   • Sexual  activity: Defer         Current Medications:    Current Outpatient Medications:   •  atorvastatin (LIPITOR) 20 MG tablet, Take 20 mg by mouth Daily., Disp: , Rfl:   •  Blood Glucose Monitoring Suppl (TRUE METRIX METER) w/Device kit, Use as directed to test Blood Sugar. (Patient taking differently: States does not check sugar at home), Disp: 1 kit, Rfl: 0  •  clopidogrel (PLAVIX) 75 MG tablet, Take 75 mg by mouth Daily. LAST DOSE 12/12/19. PER DR TEE, Disp: , Rfl:   •  cyclobenzaprine (FLEXERIL) 10 MG tablet, Take 1 tablet by mouth 3 (Three) Times a Day As Needed for Muscle Spasms., Disp: 15 tablet, Rfl: 0  •  glucose blood test strip, Use as directed to test Blood Sugar 2 times a day. (Patient taking differently: Use as directed to test Blood Sugar 2 times a day.  States does not check sugar at home), Disp: 50 each, Rfl: 0  •  HYDROcodone-acetaminophen (NORCO)  MG per tablet, Take 1 tablet by mouth Every 4 (Four) Hours As Needed for Moderate Pain  (Pain)., Disp: 12 tablet, Rfl: 0  •  metFORMIN ER (GLUCOPHAGE-XR) 500 MG 24 hr tablet, Take 1,000 mg by mouth 2 (two) times a day., Disp: , Rfl:   •  metoprolol succinate XL (TOPROL-XL) 50 MG 24 hr tablet, Take 1 tablet by mouth Daily., Disp: , Rfl:   •  naproxen (NAPROSYN) 500 MG tablet, Take 500 mg by mouth Daily., Disp: , Rfl:   •  Ozempic, 0.25 or 0.5 MG/DOSE, 2 MG/1.5ML solution pen-injector, Inject  under the skin into the appropriate area as directed 1 (One) Time Per Week., Disp: , Rfl:   •  TRUEPLUS LANCETS 28G misc, Use as Directed to test Blood Sugar 2 times a day (Patient taking differently: States does not check sugar at home), Disp: 50 each, Rfl: 0      Allergies:  Allergies   Allergen Reactions   • Penicillins Hives         Family History:  Family History   Problem Relation Age of Onset   • Heart disease Father    • Heart attack Maternal Grandmother          Surgical History:  Past Surgical History:   Procedure Laterality Date   • CARDIAC  CATHETERIZATION     • COLONOSCOPY     • CORONARY STENT PLACEMENT  2015    x1 stent [place] facility   • EXTRACORPOREAL SHOCK WAVE LITHOTRIPSY (ESWL) Right 8/21/2020    Procedure: EXTRACORPOREAL SHOCKWAVE LITHOTRIPSY;  Surgeon: Pablo Crenshaw MD;  Location: [facility] OR;  Service: Urology;  Laterality: Right;   • EXTRACORPOREAL SHOCK WAVE LITHOTRIPSY (ESWL) Right 12/4/2020    Procedure: EXTRACORPOREAL SHOCKWAVE LITHOTRIPSY;  Surgeon: Pablo Crenshaw MD;  Location: [facility] OR;  Service: Urology;  Laterality: Right;   • EXTRACORPOREAL SHOCK WAVE LITHOTRIPSY (ESWL) Right 2/19/2021    Procedure: EXTRACORPOREAL SHOCKWAVE LITHOTRIPSY;  Surgeon: Pablo Crenshaw MD;  Location: [facility] OR;  Service: Urology;  Laterality: Right;   • FRACTURE SURGERY Right     orif-ankle   • ORIF FIBULA FRACTURE Right 12/17/2019    Procedure: OPEN REDUCTION INTERNAL FIXATION RIGHT DISTAL FIBULA;  Surgeon: Christian Brooks MD;  Location: [facility] OR;  Service: Orthopedics         Radiology:   XR Shoulder 2+ View Left    Result Date: 8/8/2021    No acute findings in the left shoulder.  This report was finalized on 8/8/2021 2:19 PM by Dr. Logan Reyes MD.      XR Humerus Left    Result Date: 8/8/2021    No acute findings in the left humerus.  This report was finalized on 8/8/2021 2:19 PM by Dr. Logan Reyes MD.      CT Cervical Spine Without Contrast    Result Date: 8/8/2021  1.  No acute fracture or traumatic malalignment. 2.  Degenerative changes.  This report was finalized on 8/8/2021 2:20 PM by Dr. Logan Reyes MD.      CT Thoracic Spine Without Contrast    Result Date: 8/8/2021  No acute fracture or malalignment.  This report was finalized on 8/8/2021 2:17 PM by Dr. Logan Reyes MD.      CT Lumbar Spine Without Contrast    Result Date: 8/8/2021   1. Degenerative changes notably at L5/S1 with stenosis. 2. No fracture or traumatic malalignment. 3. Nonobstructing right kidney stone.  This report was finalized on  8/8/2021 2:18 PM by Dr. Logan Reyes MD.            Examination:   Examination         Assessment & Plan:   50 y.o. male presents         No diagnosis found.      Procedures        Cc:  Alis Xie,               This document has been electronically signed by Christian Brooks MD   August 18, 2021 09:33 EDT

## 2021-09-29 ENCOUNTER — OFFICE VISIT (OUTPATIENT)
Dept: ORTHOPEDIC SURGERY | Facility: CLINIC | Age: 51
End: 2021-09-29

## 2021-09-29 VITALS — TEMPERATURE: 98.4 F | BODY MASS INDEX: 36.88 KG/M2 | HEIGHT: 67 IN | WEIGHT: 235 LBS

## 2021-09-29 DIAGNOSIS — R20.0 NUMBNESS AND TINGLING OF LEFT HAND: Primary | ICD-10-CM

## 2021-09-29 DIAGNOSIS — R20.2 NUMBNESS AND TINGLING OF LEFT HAND: Primary | ICD-10-CM

## 2021-09-29 DIAGNOSIS — M54.2 NECK PAIN: ICD-10-CM

## 2021-09-29 DIAGNOSIS — M25.512 ACUTE PAIN OF LEFT SHOULDER: ICD-10-CM

## 2021-09-29 PROCEDURE — 99213 OFFICE O/P EST LOW 20 MIN: CPT | Performed by: ORTHOPAEDIC SURGERY

## 2021-10-06 ENCOUNTER — OFFICE VISIT (OUTPATIENT)
Dept: UROLOGY | Facility: CLINIC | Age: 51
End: 2021-10-06

## 2021-10-06 VITALS
HEIGHT: 67 IN | WEIGHT: 235.89 LBS | SYSTOLIC BLOOD PRESSURE: 130 MMHG | BODY MASS INDEX: 37.02 KG/M2 | DIASTOLIC BLOOD PRESSURE: 60 MMHG

## 2021-10-06 DIAGNOSIS — N20.0 NEPHROLITHIASIS: ICD-10-CM

## 2021-10-06 DIAGNOSIS — R10.9 ACUTE FLANK PAIN: Primary | ICD-10-CM

## 2021-10-06 DIAGNOSIS — N20.0 RENAL CALCULUS, RIGHT: ICD-10-CM

## 2021-10-06 LAB
BILIRUB BLD-MCNC: NEGATIVE MG/DL
CLARITY, POC: CLEAR
COLOR UR: YELLOW
GLUCOSE UR STRIP-MCNC: NEGATIVE MG/DL
KETONES UR QL: NEGATIVE
LEUKOCYTE EST, POC: NEGATIVE
NITRITE UR-MCNC: NEGATIVE MG/ML
PH UR: 6 [PH] (ref 5–8)
PROT UR STRIP-MCNC: NEGATIVE MG/DL
RBC # UR STRIP: NEGATIVE /UL
SP GR UR: 1.02 (ref 1–1.03)
UROBILINOGEN UR QL: NORMAL

## 2021-10-06 PROCEDURE — 96372 THER/PROPH/DIAG INJ SC/IM: CPT | Performed by: NURSE PRACTITIONER

## 2021-10-06 PROCEDURE — 99214 OFFICE O/P EST MOD 30 MIN: CPT | Performed by: NURSE PRACTITIONER

## 2021-10-06 RX ORDER — KETOROLAC TROMETHAMINE 30 MG/ML
30 INJECTION, SOLUTION INTRAMUSCULAR; INTRAVENOUS EVERY 6 HOURS PRN
Status: SHIPPED | OUTPATIENT
Start: 2021-10-06 | End: 2021-10-11

## 2021-10-06 RX ORDER — PROMETHAZINE HYDROCHLORIDE 25 MG/1
25 TABLET ORAL EVERY 12 HOURS PRN
Qty: 20 TABLET | Refills: 0 | Status: SHIPPED | OUTPATIENT
Start: 2021-10-06 | End: 2021-11-03 | Stop reason: ALTCHOICE

## 2021-10-06 RX ORDER — KETOROLAC TROMETHAMINE 10 MG/1
10 TABLET, FILM COATED ORAL EVERY 8 HOURS PRN
Qty: 20 TABLET | Refills: 0 | Status: SHIPPED | OUTPATIENT
Start: 2021-10-06 | End: 2021-11-03 | Stop reason: ALTCHOICE

## 2021-10-06 RX ORDER — METHOCARBAMOL 750 MG/1
750 TABLET, FILM COATED ORAL 2 TIMES DAILY PRN
Qty: 20 TABLET | Refills: 0 | Status: SHIPPED | OUTPATIENT
Start: 2021-10-06 | End: 2021-11-03 | Stop reason: ALTCHOICE

## 2021-10-06 RX ADMIN — KETOROLAC TROMETHAMINE 30 MG: 30 INJECTION, SOLUTION INTRAMUSCULAR; INTRAVENOUS at 14:34

## 2021-10-06 NOTE — PATIENT INSTRUCTIONS
"Textbook of Natural Medicine (5th ed., pp. 6169-9173.e3). [place], [place]: Elsevier.\">   Dietary Guidelines to Help Prevent Kidney Stones  Kidney stones are deposits of minerals and salts that form inside your kidneys. Your risk of developing kidney stones may be greater depending on your diet, your lifestyle, the medicines you take, and whether you have certain medical conditions. Most people can lower their chances of developing kidney stones by following the instructions below. Your dietitian may give you more specific instructions depending on your overall health and the type of kidney stones you tend to develop.  What are tips for following this plan?  Reading food labels    · Choose foods with \"no salt added\" or \"low-salt\" labels. Limit your salt (sodium) intake to less than 1,500 mg a day.  · Choose foods with calcium for each meal and snack. Try to eat about 300 mg of calcium at each meal. Foods that contain 200-500 mg of calcium a serving include:  ? 8 oz (237 mL) of milk, calcium-fortifiednon-dairy milk, and calcium-fortifiedfruit juice. Calcium-fortified means that calcium has been added to these drinks.  ? 8 oz (237 mL) of kefir, yogurt, and soy yogurt.  ? 4 oz (114 g) of tofu.  ? 1 oz (28 g) of cheese.  ? 1 cup (150 g) of dried figs.  ? 1 cup (91 g) of cooked broccoli.  ? One 3 oz (85 g) can of sardines or mackerel.  Most people need 1,000-1,500 mg of calcium a day. Talk to your dietitian about how much calcium is recommended for you.  Shopping  · Buy plenty of fresh fruits and vegetables. Most people do not need to avoid fruits and vegetables, even if these foods contain nutrients that may contribute to kidney stones.  · When shopping for convenience foods, choose:  ? Whole pieces of fruit.  ? Pre-made salads with dressing on the side.  ? Low-fat fruit and yogurt smoothies.  · Avoid buying frozen meals or prepared deli foods. These can be high in sodium.  · Look for foods with live cultures, such as " yogurt and kefir.  · Choose high-fiber grains, such as whole-wheat breads, oat bran, and wheat cereals.  Cooking  · Do not add salt to food when cooking. Place a salt shaker on the table and allow each person to add his or her own salt to taste.  · Use vegetable protein, such as beans, textured vegetable protein (TVP), or tofu, instead of meat in pasta, casseroles, and soups.  Meal planning  · Eat less salt, if told by your dietitian. To do this:  ? Avoid eating processed or pre-made food.  ? Avoid eating fast food.  · Eat less animal protein, including cheese, meat, poultry, or fish, if told by your dietitian. To do this:  ? Limit the number of times you have meat, poultry, fish, or cheese each week. Eat a diet free of meat at least 2 days a week.  ? Eat only one serving each day of meat, poultry, fish, or seafood.  ? When you prepare animal protein, cut pieces into small portion sizes. For most meat and fish, one serving is about the size of the palm of your hand.  · Eat at least five servings of fresh fruits and vegetables each day. To do this:  ? Keep fruits and vegetables on hand for snacks.  ? Eat one piece of fruit or a handful of berries with breakfast.  ? Have a salad and fruit at lunch.  ? Have two kinds of vegetables at dinner.  · Limit foods that are high in a substance called oxalate. These include:  ? Spinach (cooked), rhubarb, beets, sweet potatoes, and Swiss chard.  ? Peanuts.  ? Potato chips, french fries, and baked potatoes with skin on.  ? Nuts and nut products.  ? Chocolate.  · If you regularly take a diuretic medicine, make sure to eat at least 1 or 2 servings of fruits or vegetables that are high in potassium each day. These include:  ? Avocado.  ? Banana.  ? Orange, prune, carrot, or tomato juice.  ? Baked potato.  ? Cabbage.  ? Beans and split peas.  Lifestyle    · Drink enough fluid to keep your urine pale yellow. This is the most important thing you can do. Spread your fluid intake  throughout the day.  · If you drink alcohol:  ? Limit how much you use to:  § 0-1 drink a day for women who are not pregnant.  § 0-2 drinks a day for men.  ? Be aware of how much alcohol is in your drink. In the [place] one drink equals one 12 oz bottle of beer (355 mL), one 5 oz glass of wine (148 mL), or one 1½ oz glass of hard liquor (44 mL).  · Lose weight if told by your health care provider. Work with your dietitian to find an eating plan and weight loss strategies that work best for you.    General information  · Talk to your health care provider and dietitian about taking daily supplements. You may be told the following depending on your health and the cause of your kidney stones:  ? Not to take supplements with vitamin C.  ? To take a calcium supplement.  ? To take a daily probiotic supplement.  ? To take other supplements such as magnesium, fish oil, or vitamin B6.  · Take over-the-counter and prescription medicines only as told by your health care provider. These include supplements.  What foods should I limit?  Limit your intake of the following foods, or eat them as told by your dietitian.  Vegetables  Spinach. Rhubarb. Beets. Canned vegetables. Pickles. Olives. Baked potatoes with skin.  Grains  Wheat bran. Baked goods. Salted crackers. Cereals high in sugar.  Meats and other proteins  Nuts. Nut butters. Large portions of meat, poultry, or fish. Salted, precooked, or cured meats, such as sausages, meat loaves, and hot dogs.  Dairy  Cheese.  Beverages  Regular soft drinks. Regular vegetable juice.  Seasonings and condiments  Seasoning blends with salt. Salad dressings. Soy sauce. Ketchup. Barbecue sauce.  Other foods  Canned soups. Canned pasta sauce. Casseroles. Pizza. Lasagna. Frozen meals. Potato chips. French fries.  The items listed above may not be a complete list of foods and beverages you should limit. Contact a dietitian for more information.  What foods should I avoid?  Talk to your dietitian  about specific foods you should avoid based on the type of kidney stones you have and your overall health.  Fruits  Grapefruit.  The item listed above may not be a complete list of foods and beverages you should avoid. Contact a dietitian for more information.  Summary  · Kidney stones are deposits of minerals and salts that form inside your kidneys.  · You can lower your risk of kidney stones by making changes to your diet.  · The most important thing you can do is drink enough fluid. Drink enough fluid to keep your urine pale yellow.  · Talk to your dietitian about how much calcium you should have each day, and eat less salt and animal protein as told by your dietitian.  This information is not intended to replace advice given to you by your health care provider. Make sure you discuss any questions you have with your health care provider.  Document Revised: 12/10/2020 Document Reviewed: 12/10/2020  [employer] Patient Education © 2021 [employer]      Low-Purine Eating Plan  A low-purine eating plan involves making food choices to limit your intake of purine. Purine is a kind of uric acid. Too much uric acid in your blood can cause certain conditions, such as gout and kidney stones. Eating a low-purine diet can help control these conditions.  What are tips for following this plan?  Reading food labels  · Avoid foods with saturated or Trans fat.  · Check the ingredient list of grains-based foods, such as bread and cereal, to make sure that they contain whole grains.  · Check the ingredient list of sauces or soups to make sure they do not contain meat or fish.  · When choosing soft drinks, check the ingredient list to make sure they do not contain high-fructose corn syrup.  Shopping    · Buy plenty of fresh fruits and vegetables.  · Avoid buying canned or fresh fish.  · Buy dairy products labeled as low-fat or nonfat.  · Avoid buying premade or processed foods. These foods are often high in fat, salt (sodium), and  added sugar.    Cooking  · Use olive oil instead of butter when cooking. Oils like olive oil, canola oil, and sunflower oil contain healthy fats.  Meal planning  · Learn which foods do or do not affect you. If you find out that a food tends to cause your gout symptoms to flare up, avoid eating that food. You can enjoy foods that do not cause problems. If you have any questions about a food item, talk with your dietitian or health care provider.  · Limit foods high in fat, especially saturated fat. Fat makes it harder for your body to get rid of uric acid.  · Choose foods that are lower in fat and are lean sources of protein.  General guidelines  · Limit alcohol intake to no more than 1 drink a day for nonpregnant women and 2 drinks a day for men. One drink equals 12 oz of beer, 5 oz of wine, or 1½ oz of hard liquor. Alcohol can affect the way your body gets rid of uric acid.  · Drink plenty of water to keep your urine clear or pale yellow. Fluids can help remove uric acid from your body.  · If directed by your health care provider, take a vitamin C supplement.  · Work with your health care provider and dietitian to develop a plan to achieve or maintain a healthy weight. Losing weight can help reduce uric acid in your blood.  What foods are recommended?  The items listed may not be a complete list. Talk with your dietitian about what dietary choices are best for you.  Foods low in purines  Foods low in purines do not need to be limited. These include:  · All fruits.  · All low-purine vegetables, pickles, and olives.  · Breads, pasta, rice, cornbread, and popcorn. Cake and other baked goods.  · All dairy foods.  · Eggs, nuts, and nut butters.  · Spices and condiments, such as salt, herbs, and vinegar.  · Plant oils, butter, and margarine.  · Water, sugar-free soft drinks, tea, coffee, and cocoa.  · Vegetable-based soups, broths, sauces, and gravies.  Foods moderate in purines  Foods moderate in purines should be  limited to the amounts listed.  · ½ cup of asparagus, cauliflower, spinach, mushrooms, or green peas, each day.  · 2/3 cup uncooked oatmeal, each day.  · ¼ cup dry wheat bran or wheat germ, each day.  · 2-3 ounces of meat or poultry, each day.  · 4-6 ounces of shellfish, such as crab, lobster, oysters, or shrimp, each day.  · 1 cup cooked beans, peas, or lentils, each day.  · Soup, broths, or bouillon made from meat or fish. Limit these foods as much as possible.  What foods are not recommended?  The items listed may not be a complete list. Talk with your dietitian about what dietary choices are best for you.  Limit your intake of foods high in purines, including:  · Beer and other alcohol.  · Meat-based gravy or sauce.  · Canned or fresh fish, such as:  ? Anchovies, sardines, herring, and tuna.  ? Mussels and scallops.  ? Codfish, trout, and j luis.  · [place].  · Organ meats, such as:  ? Liver or kidney.  ? Tripe.  ? Sweetbreads (thymus gland or pancreas).  · Wild game or goose.  · Yeast or yeast extract supplements.  · Drinks sweetened with high-fructose corn syrup.  Summary  · Eating a low-purine diet can help control conditions caused by too much uric acid in the body, such as gout or kidney stones.  · Choose low-purine foods, limit alcohol, and limit foods high in fat.  · You will learn over time which foods do or do not affect you. If you find out that a food tends to cause your gout symptoms to flare up, avoid eating that food.  This information is not intended to replace advice given to you by your health care provider. Make sure you discuss any questions you have with your health care provider.  Document Revised: 04/01/2021 Document Reviewed: 04/01/2021  [employer] Patient Education © 2021 [employer]    Flank Pain, Adult  Flank pain is pain in your side. The flank is the area of your side between your upper belly (abdomen) and your back. The pain may occur over a short time (acute), or it may be long-term  or come back often (chronic). It may be mild or very bad. Pain in this area can be caused by many different things.  Follow these instructions at home:    · Drink enough fluid to keep your pee (urine) clear or pale yellow.  · Rest as told by your doctor.  · Take over-the-counter and prescription medicines only as told by your doctor.  · Keep a journal to keep track of:  ? What has caused your flank pain.  ? What has made it feel better.  · Keep all follow-up visits as told by your doctor. This is important.  Contact a doctor if:  · Medicine does not help your pain.  · You have new symptoms.  · Your pain gets worse.  · You have a fever.  · Your symptoms last longer than 2-3 days.  · You have trouble peeing.  · You are peeing more often than normal.  Get help right away if:  · You have trouble breathing.  · You are short of breath.  · Your belly hurts, or it is swollen or red.  · You feel sick to your stomach (nauseous).  · You throw up (vomit).  · You feel like you will pass out, or you do pass out (faint).  · You have blood in your pee.  Summary  · Flank pain is pain in your side. The flank is the area of your side between your upper belly (abdomen) and your back.  · Flank pain may occur over a short time (acute), or it may be long-term or come back often (chronic). It may be mild or very bad.  · Pain in this area can be caused by many different things.  · Contact your doctor if your symptoms get worse or they last longer than 2-3 days.  This information is not intended to replace advice given to you by your health care provider. Make sure you discuss any questions you have with your health care provider.  Document Revised: 11/30/2018 Document Reviewed: 04/09/2018  [employer] Patient Education © 2021 [employer]
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
## 2021-10-11 ENCOUNTER — TELEPHONE (OUTPATIENT)
Dept: ORTHOPEDIC SURGERY | Facility: CLINIC | Age: 51
End: 2021-10-11
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
## 2021-10-11 NOTE — TELEPHONE ENCOUNTER
Paperwork was not received via e-mail advised the patient to have the paperwork fax to [phone]. Patient stated he would do that.

## 2021-10-19 ENCOUNTER — HOSPITAL ENCOUNTER (OUTPATIENT)
Dept: MRI IMAGING | Facility: HOSPITAL | Age: 51
Discharge: HOME | End: 2021-10-19

## 2021-10-19 DIAGNOSIS — M25.512 ACUTE PAIN OF LEFT SHOULDER: ICD-10-CM

## 2021-10-19 DIAGNOSIS — M54.2 NECK PAIN: ICD-10-CM

## 2021-10-19 DIAGNOSIS — R20.0 NUMBNESS AND TINGLING OF LEFT HAND: ICD-10-CM

## 2021-10-19 DIAGNOSIS — R20.2 NUMBNESS AND TINGLING OF LEFT HAND: ICD-10-CM

## 2021-10-22 ENCOUNTER — TELEPHONE (OUTPATIENT)
Dept: ORTHOPEDIC SURGERY | Facility: CLINIC | Age: 51
End: 2021-10-22

## 2021-10-22 NOTE — TELEPHONE ENCOUNTER
Patient was not able to follow through with MRI at the hospital due to being claustrophobic he requested an open MRI.   Patient has an appointment on 10/28/2021 [facility] [place] [employer]

## 2021-11-03 ENCOUNTER — OFFICE VISIT (OUTPATIENT)
Dept: ORTHOPEDIC SURGERY | Facility: CLINIC | Age: 51
End: 2021-11-03

## 2021-11-03 VITALS
HEART RATE: 96 BPM | DIASTOLIC BLOOD PRESSURE: 89 MMHG | BODY MASS INDEX: 36.88 KG/M2 | TEMPERATURE: 97.8 F | WEIGHT: 235 LBS | HEIGHT: 67 IN | SYSTOLIC BLOOD PRESSURE: 133 MMHG

## 2021-11-03 DIAGNOSIS — R20.2 NUMBNESS AND TINGLING OF LEFT HAND: ICD-10-CM

## 2021-11-03 DIAGNOSIS — M25.512 ACUTE PAIN OF LEFT SHOULDER: ICD-10-CM

## 2021-11-03 DIAGNOSIS — S46.912A STRAIN OF LEFT SHOULDER, INITIAL ENCOUNTER: Primary | ICD-10-CM

## 2021-11-03 DIAGNOSIS — R20.0 NUMBNESS AND TINGLING OF LEFT HAND: ICD-10-CM

## 2021-11-03 PROCEDURE — 99213 OFFICE O/P EST LOW 20 MIN: CPT | Performed by: ORTHOPAEDIC SURGERY

## 2021-11-03 PROCEDURE — 20610 DRAIN/INJ JOINT/BURSA W/O US: CPT | Performed by: ORTHOPAEDIC SURGERY

## 2021-11-03 RX ORDER — SILDENAFIL 100 MG/1
TABLET, FILM COATED ORAL
COMMUNITY
Start: 2021-10-07

## 2021-11-03 RX ADMIN — METHYLPREDNISOLONE ACETATE 80 MG: 80 INJECTION, SUSPENSION INTRA-ARTICULAR; INTRALESIONAL; INTRAMUSCULAR; SOFT TISSUE at 13:04

## 2021-11-03 RX ADMIN — LIDOCAINE HYDROCHLORIDE 5 ML: 10 INJECTION, SOLUTION EPIDURAL; INFILTRATION; INTRACAUDAL; PERINEURAL at 13:04
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
## 2021-11-04 ENCOUNTER — TELEPHONE (OUTPATIENT)
Dept: ORTHOPEDIC SURGERY | Facility: CLINIC | Age: 51
End: 2021-11-04

## 2021-11-05 ENCOUNTER — TELEPHONE (OUTPATIENT)
Dept: ORTHOPEDIC SURGERY | Facility: CLINIC | Age: 51
End: 2021-11-05

## 2021-11-05 RX ORDER — LIDOCAINE HYDROCHLORIDE 10 MG/ML
5 INJECTION, SOLUTION EPIDURAL; INFILTRATION; INTRACAUDAL; PERINEURAL
Status: COMPLETED | OUTPATIENT
Start: 2021-11-03 | End: 2021-11-03

## 2021-11-05 RX ORDER — METHYLPREDNISOLONE ACETATE 80 MG/ML
80 INJECTION, SUSPENSION INTRA-ARTICULAR; INTRALESIONAL; INTRAMUSCULAR; SOFT TISSUE
Status: COMPLETED | OUTPATIENT
Start: 2021-11-03 | End: 2021-11-03

## 2021-11-05 NOTE — TELEPHONE ENCOUNTER
Notified patient of his appointment on 11/09/2021 at 11:[facility] am at [facility] [place] phone # [phone].     Faxed order and approval per Ilan WEBER Case #[id number]  Auth # [id number]  Exp. 10/22/2021- 12/21/2021  Fax # [phone]    Received fax confirmation

## 2021-11-19 ENCOUNTER — TELEPHONE (OUTPATIENT)
Dept: ORTHOPEDIC SURGERY | Facility: CLINIC | Age: 51
End: 2021-11-19

## 2021-11-19 NOTE — TELEPHONE ENCOUNTER
Caller: RAKESH URBINA    Relationship: SELF    Best call back number: [phone]    What form or medical record are you requesting: WORK STATUS/TIME OFF    Who is requesting this form or medical record from you: MANOHAR    How would you like to receive the form or medical records (pick-up, mail, fax):   FAX: [phone]  CLAIM # [id number]      Timeframe paperwork needed: AS SOON AS POSSIBLE / TODAY IF POSSIBLE 11-19-21    Additional notes:  PATIENT NEEDS WORK STATUS / WORK OFF

## 2021-11-29 ENCOUNTER — OFFICE VISIT (OUTPATIENT)
Dept: ORTHOPEDIC SURGERY | Facility: CLINIC | Age: 51
End: 2021-11-29

## 2021-11-29 ENCOUNTER — HOSPITAL ENCOUNTER (OUTPATIENT)
Dept: GENERAL RADIOLOGY | Facility: HOSPITAL | Age: 51
Discharge: HOME | End: 2021-11-29
Admitting: ORTHOPAEDIC SURGERY

## 2021-11-29 VITALS
HEART RATE: 91 BPM | HEIGHT: 67 IN | DIASTOLIC BLOOD PRESSURE: 103 MMHG | BODY MASS INDEX: 37.02 KG/M2 | WEIGHT: 235.89 LBS | SYSTOLIC BLOOD PRESSURE: 146 MMHG

## 2021-11-29 DIAGNOSIS — S46.912A STRAIN OF LEFT SHOULDER, INITIAL ENCOUNTER: ICD-10-CM

## 2021-11-29 DIAGNOSIS — S46.012D TRAUMATIC COMPLETE TEAR OF LEFT ROTATOR CUFF, SUBSEQUENT ENCOUNTER: Primary | ICD-10-CM

## 2021-11-29 DIAGNOSIS — M19.012 OSTEOARTHRITIS OF LEFT AC (ACROMIOCLAVICULAR) JOINT: ICD-10-CM

## 2021-11-29 PROCEDURE — 73030 X-RAY EXAM OF SHOULDER: CPT | Performed by: RADIOLOGY

## 2021-11-29 PROCEDURE — 73030 X-RAY EXAM OF SHOULDER: CPT

## 2021-11-29 PROCEDURE — 99213 OFFICE O/P EST LOW 20 MIN: CPT | Performed by: ORTHOPAEDIC SURGERY

## 2021-12-09 ENCOUNTER — OFFICE VISIT (OUTPATIENT)
Dept: ORTHOPEDIC SURGERY | Facility: CLINIC | Age: 51
End: 2021-12-09

## 2021-12-09 VITALS
SYSTOLIC BLOOD PRESSURE: 130 MMHG | WEIGHT: 235.89 LBS | HEIGHT: 67 IN | BODY MASS INDEX: 37.02 KG/M2 | DIASTOLIC BLOOD PRESSURE: 72 MMHG

## 2021-12-09 DIAGNOSIS — S46.012A TRAUMATIC COMPLETE TEAR OF LEFT ROTATOR CUFF, INITIAL ENCOUNTER: Primary | ICD-10-CM

## 2021-12-09 DIAGNOSIS — M75.22 BICEPS TENDINITIS OF LEFT UPPER EXTREMITY: ICD-10-CM

## 2021-12-09 DIAGNOSIS — M75.52 BURSITIS OF LEFT SHOULDER: ICD-10-CM

## 2021-12-09 DIAGNOSIS — M75.42 IMPINGEMENT SYNDROME OF LEFT SHOULDER: ICD-10-CM

## 2021-12-09 PROCEDURE — 99214 OFFICE O/P EST MOD 30 MIN: CPT | Performed by: ORTHOPAEDIC SURGERY
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
## 2021-12-27 ENCOUNTER — APPOINTMENT (OUTPATIENT)
Dept: PREADMISSION TESTING | Facility: HOSPITAL | Age: 51
End: 2021-12-27

## 2021-12-27 ENCOUNTER — TELEPHONE (OUTPATIENT)
Dept: ORTHOPEDIC SURGERY | Facility: CLINIC | Age: 51
End: 2021-12-27

## 2021-12-27 NOTE — TELEPHONE ENCOUNTER
Provider: DR BLAKELY    Caller: RAKESH URBINA    Relationship to Patient: SELF    Phone Number: [phone]    Reason for Call: PT WANTS TO SEE IF DR BLAKELY CAN REFER HIM TO A SPINE SPECIALIST FOR THE NECK PAIN HE IS STILL HAVING. PLEASE CALL HIM BACK AT THE NUMBER ABOVE.

## 2022-01-04 ENCOUNTER — OFFICE VISIT (OUTPATIENT)
Dept: CARDIOLOGY | Facility: CLINIC | Age: 52
End: 2022-01-04

## 2022-01-04 VITALS
DIASTOLIC BLOOD PRESSURE: 92 MMHG | BODY MASS INDEX: 38.08 KG/M2 | SYSTOLIC BLOOD PRESSURE: 142 MMHG | HEART RATE: 91 BPM | OXYGEN SATURATION: 97 % | HEIGHT: 67 IN | WEIGHT: 242.6 LBS | TEMPERATURE: 97 F

## 2022-01-04 DIAGNOSIS — R07.2 PRECORDIAL PAIN: ICD-10-CM

## 2022-01-04 DIAGNOSIS — I25.10 ASCVD (ARTERIOSCLEROTIC CARDIOVASCULAR DISEASE): Primary | ICD-10-CM

## 2022-01-04 PROCEDURE — 99214 OFFICE O/P EST MOD 30 MIN: CPT | Performed by: PHYSICIAN ASSISTANT

## 2022-01-04 PROCEDURE — 93000 ELECTROCARDIOGRAM COMPLETE: CPT | Performed by: PHYSICIAN ASSISTANT

## 2022-01-04 RX ORDER — LISINOPRIL 2.5 MG/1
TABLET ORAL
COMMUNITY
Start: 2021-12-29

## 2022-01-04 RX ORDER — ASPIRIN 81 MG/1
81 TABLET ORAL DAILY
Qty: 90 TABLET | Refills: 3 | Status: SHIPPED | OUTPATIENT
Start: 2022-01-04

## 2022-01-04 NOTE — PROGRESS NOTES
Iram, April, APRN  Austin Sneed  1970 01/04/2022    Patient Active Problem List   Diagnosis   • Precordial pain   • Dyspnea on exertion   • ASCVD (arteriosclerotic cardiovascular disease), status post stenting about 3 years ago in [facility].    • Chest pain   • Erectile dysfunction   • Closed nondisplaced fracture of lateral malleolus of right fibula   • Morbidly obese (HCC)   • Type 2 diabetes mellitus without complication, without long-term current use of insulin (HCC)   • Displaced fracture of lateral malleolus of right fibula, initial encounter for closed fracture   • Renal calculus, right   • Umbilical hernia without obstruction and without gangrene   • Gross hematuria   • Ureteral calculus   • Acute pain of left shoulder   • Osteoarthritis of left AC (acromioclavicular) joint   • Rotator cuff tendinitis, left   • Traumatic complete tear of left rotator cuff   • Biceps tendinitis of left upper extremity   • Impingement syndrome of left shoulder   • Bursitis of left shoulder       Dear Iram April, APRN:    Subjective     History of Present Illness:    Chief Complaint   Patient presents with   • Follow-up     DENIES ANY CARDIAC SYMPTOMS   • Surgical Clearance     L SHOULDER SCOPE       Austin Sneed is a pleasant 51 y.o. male with a past medical history significant for coronary artery disease status post stenting in 2016 in [facility].  He also has history of diabetes mellitus, dyslipidemia, essential hypertension.  He also has history of tobacco abuse.  He comes in today for surgical risk evaluation for right shoulder arthroscopy.    Austin has no complaints with open questions today.  He denies any chest pains, shortness of breath, palpitations, dizziness, or syncope.  He does need surgical risk evaluation for potential left shoulder repair/arthroscopy.  He did have a stress test in March which was unremarkable showing no signs of ischemia.    Allergies   Allergen Reactions   • Penicillins  Hives   :      Current Outpatient Medications:   •  atorvastatin (LIPITOR) 20 MG tablet, Take 20 mg by mouth Daily., Disp: , Rfl:   •  lisinopril (PRINIVIL,ZESTRIL) 2.5 MG tablet, , Disp: , Rfl:   •  metFORMIN ER (GLUCOPHAGE-XR) 500 MG 24 hr tablet, Take 1,000 mg by mouth 2 (two) times a day., Disp: , Rfl:   •  metoprolol succinate XL (TOPROL-XL) 50 MG 24 hr tablet, Take 1 tablet by mouth Daily., Disp: , Rfl:   •  Ozempic, 0.25 or 0.5 MG/DOSE, 2 MG/1.5ML solution pen-injector, Inject  under the skin into the appropriate area as directed 1 (One) Time Per Week., Disp: , Rfl:   •  sildenafil (VIAGRA) 100 MG tablet, TAKE ONE TABLET BY MOUTH DAILY AS NEEDED APPROXIMATELY 1 HOUR BEFORE SEXUAL ACTIVITY, Disp: , Rfl:   •  aspirin (aspirin) 81 MG EC tablet, Take 1 tablet by mouth Daily., Disp: 90 tablet, Rfl: 3  •  Blood Glucose Monitoring Suppl (TRUE METRIX METER) w/Device kit, Use as directed to test Blood Sugar. (Patient taking differently: States does not check sugar at home), Disp: 1 kit, Rfl: 0  •  glucose blood test strip, Use as directed to test Blood Sugar 2 times a day. (Patient taking differently: Use as directed to test Blood Sugar 2 times a day.  States does not check sugar at home), Disp: 50 each, Rfl: 0  •  TRUEPLUS LANCETS 28G misc, Use as Directed to test Blood Sugar 2 times a day (Patient taking differently: States does not check sugar at home), Disp: 50 each, Rfl: 0    The following portions of the patient's history were reviewed and updated as appropriate: allergies, current medications, past family history, past medical history, past social history, past surgical history and problem list.    Social History     Tobacco Use   • Smoking status: Former Smoker     Packs/day: 0.50     Years: 10.00     Pack years: 5.00     Types: Cigarettes     Quit date:      Years since quittin.0   • Smokeless tobacco: Never Used   Vaping Use   • Vaping Use: Never used   Substance Use Topics   • Alcohol use: Yes      "Comment: social   • Drug use: Never         Objective   Vitals:    01/04/22 1518   BP: 142/92   Pulse: 91   Temp: 97 °F (36.1 °C)   SpO2: 97%   Weight: 110 kg (242 lb 9.6 oz)   Height: 170.2 cm (67\")     Body mass index is 38 kg/m².    Constitutional:       General: Not in acute distress.     Appearance: Healthy appearance. Well-developed and not in distress. Not diaphoretic.   Eyes:      Conjunctiva/sclera: Conjunctivae normal.      Pupils: Pupils are equal, round, and reactive to light.   HENT:      Head: Normocephalic and atraumatic.   Neck:      Vascular: No carotid bruit or JVD.   Pulmonary:      Effort: Pulmonary effort is normal. No respiratory distress.      Breath sounds: Normal breath sounds.   Cardiovascular:      Normal rate. Regular rhythm.   Skin:     General: Skin is cool.   Neurological:      Mental Status: Alert, oriented to person, place, and time and oriented to person, place and time.         Lab Results   Component Value Date     02/17/2021    K 4.6 02/17/2021     02/17/2021    CO2 26.7 02/17/2021    BUN 18 02/17/2021    CREATININE 1.18 02/17/2021    GLUCOSE 114 (H) 02/17/2021    CALCIUM 9.8 02/17/2021    AST 17 12/02/2020    ALT 29 12/02/2020    ALKPHOS 51 12/02/2020     Lab Results   Component Value Date    CKTOTAL 108 08/10/2020     Lab Results   Component Value Date    WBC 7.36 02/17/2021    HGB 14.8 02/17/2021    HCT 46.1 02/17/2021     02/17/2021     No results found for: INR  Lab Results   Component Value Date    MG 2.0 08/10/2020     Lab Results   Component Value Date    TSH 4.170 08/10/2020    PSA 1.540 09/04/2020    TRIG 97 07/20/2019    HDL 35 (L) 07/20/2019    LDL 43 07/20/2019      Lab Results   Component Value Date    BNP <2.0 07/25/2018       During this visit the following were done:  Labs Reviewed []    Labs Ordered []    Radiology Reports Reviewed []    Radiology Ordered []    PCP Records Reviewed []    Referring Provider Records Reviewed []    ER Records " Reviewed []    Hospital Records Reviewed []    History Obtained From Family []    Radiology Images Reviewed []    Other Reviewed []    Records Requested []         ECG 12 Lead    Date/Time: 1/4/2022 3:20 PM  Performed by: Lloyd Escobar PA-C  Authorized by: Lloyd Escobar PA-C   Comparison: compared with previous ECG   Similar to previous ECG  Rhythm: sinus rhythm            Assessment/Plan    Diagnosis Plan   1. ASCVD (arteriosclerotic cardiovascular disease), status post stenting about 3 years ago in [facility].      2. Precordial pain              Recommendations:  1. Surgical risk evaluation  1. Discussed case with Dr. Castillo patient chest pain-free with recent stress test earlier this year with that was unremarkable.  He will need to continue aspirin throughout the procedure as he does have history of stenting otherwise is cleared from cardiovascular standpoint with low to moderate risk for history of CAD.      No follow-ups on file.    As always, I appreciate very much the opportunity to participate in the cardiovascular care of your patients.      With Best Regards,    Lloyd Escobar PA-C

## 2022-01-10 ENCOUNTER — LAB (OUTPATIENT)
Dept: PREADMISSION TESTING | Facility: HOSPITAL | Age: 52
End: 2022-01-10

## 2022-01-10 DIAGNOSIS — M75.42 IMPINGEMENT SYNDROME OF LEFT SHOULDER: ICD-10-CM

## 2022-01-10 DIAGNOSIS — M75.52 BURSITIS OF LEFT SHOULDER: ICD-10-CM

## 2022-01-10 DIAGNOSIS — M75.22 BICEPS TENDINITIS OF LEFT UPPER EXTREMITY: ICD-10-CM

## 2022-01-10 DIAGNOSIS — S46.012A TRAUMATIC COMPLETE TEAR OF LEFT ROTATOR CUFF, INITIAL ENCOUNTER: ICD-10-CM

## 2022-01-10 PROCEDURE — U0004 COV-19 TEST NON-CDC HGH THRU: HCPCS

## 2022-01-10 PROCEDURE — C9803 HOPD COVID-19 SPEC COLLECT: HCPCS

## 2022-01-11 DIAGNOSIS — S46.012A TRAUMATIC COMPLETE TEAR OF LEFT ROTATOR CUFF, INITIAL ENCOUNTER: Primary | ICD-10-CM

## 2022-01-11 DIAGNOSIS — M54.2 NECK PAIN: Primary | ICD-10-CM

## 2022-01-11 DIAGNOSIS — M75.22 BICEPS TENDINITIS OF LEFT UPPER EXTREMITY: ICD-10-CM

## 2022-01-11 DIAGNOSIS — M75.52 BURSITIS OF LEFT SHOULDER: ICD-10-CM

## 2022-01-11 DIAGNOSIS — M75.42 IMPINGEMENT SYNDROME OF LEFT SHOULDER: ICD-10-CM

## 2022-01-11 LAB
REF LAB TEST METHOD: NORMAL
SARS-COV-2 RNA RESP QL NAA+PROBE: NORMAL

## 2022-01-11 RX ORDER — METHOCARBAMOL 500 MG/1
500 TABLET, FILM COATED ORAL 3 TIMES DAILY PRN
Qty: 40 TABLET | Refills: 1 | Status: SHIPPED | OUTPATIENT
Start: 2022-01-11 | End: 2022-01-25

## 2022-01-11 RX ORDER — HYDROCODONE BITARTRATE AND ACETAMINOPHEN 10; 325 MG/1; MG/1
1 TABLET ORAL EVERY 4 HOURS PRN
Qty: 42 TABLET | Refills: 0 | Status: SHIPPED | OUTPATIENT
Start: 2022-01-11 | End: 2022-01-18

## 2022-01-11 RX ORDER — ONDANSETRON 4 MG/1
4 TABLET, FILM COATED ORAL EVERY 6 HOURS PRN
Qty: 30 TABLET | Refills: 1 | Status: SHIPPED | OUTPATIENT
Start: 2022-01-11 | End: 2022-01-19

## 2022-01-12 ENCOUNTER — DOCUMENTATION (OUTPATIENT)
Dept: ORTHOPEDIC SURGERY | Facility: CLINIC | Age: 52
End: 2022-01-12

## 2022-01-12 NOTE — PROGRESS NOTES
[facility]  [address] [place] [address]    [facility] OPERATIVE REPORT    Operative Report     Side/SHOULDER: Left shoulder    LOCATION: [facility]     Surgeon: Syd Blas MD     Preoperative Diagnosis:  1.     Rotator cuff tear - treated with arthroscopic rotator cuff repair - CPT 36967  2.     Shoulder impingement syndrome - treated with arthroscopic subacromial decompression with acromioplasty - CPT 11654      Massive rotator cuff tears with significant retraction and chronic dysplastic features     Postoperative Diagnosis:  1.     SAME as preoperative diagnoses     Procedure:  1.     Arthroscopic rotator cuff repair (3x2) - CPT 59064  2.     Arthroscopic subacromial decompression with acromioplasty - CPT 90558        Admission status: elective outpatient surgery     COVID-19 Statement: The patient understands that the surgery is elective surgery.  Patient is aware of the ongoing COVID-19 pandemic and potential implications.     Complications: None     EBL: 10mL     Specimen: NONE     Anesthesia: general anesthesia     Block: regional interscalene block with single shot per anesthesia     Antibiotics: weight based IV antibiotics infused prior to incision     Time out: Time out was called and the patient, side, site, and intended procedures were confirmed.     Counts: Needle and sponge counts were correct prior to closure.     DVT prophylaxis: The patient will be weight bearing as tolerated on bilateral lower extremities postoperatively with no additional chemical DVT prophylaxis indicated.     Marked: The patient was marked with an indelible marker in the preoperative holding area confirming the correct side and site.     History & Physical:   A history and physical examination was completed and updated in the preoperative holding area.     Consent: The patient signed the consent form for surgery with an understanding of the risks and  benefits as outlined in the clinic and in the preoperative holding area.     Risks and Benefits: Specific risks and benefits discussed included - pain, bleeding, infection, injury to nerves or blood vessels, fracture, stiffness, failure to heal, revision surgery, deformity of biceps or asymmetry, complications of the block, anesthetic complications, and medical complications associated with surgery.  COVID-19 awareness as noted and discussed.        Indications for surgery:  The patient has history, physical examination, and radiographic findings confirming the diagnoses.  The patient has persistent pain and functional loss unresponsive to non-operative treatment consisting of selective rest/activity modification, medications, and exercises.  MRI documented the status of the rotator cuff - rotator cuff tearing was noted.     Impingement syndrome - the patient had history and clinical exam findings consistent with shoulder impingement syndrome.  Additionally, the patient had subacromial bursitis which was encountered during the arthroscopic shoulder procedure.  Subacromial decompression with minimal acromioplasty was indicated as part of the treatment of impingement syndrome, and additionally to enhance arthroscopic visualization to enable minimally invasive, arthroscopic rotator cuff repair.    The biceps tendon was closely scrutinized he had a small degenerative labral tear but the biceps is otherwise intact with no tearing no synovitis and no indication for tenodesis in this circumstance given the satisfactory findings intraoperatively.      We had a good discussion preoperatively that these tears may be completely chronic massive irreparable tears.  Fortunately we were able to mobilize the tissue and get a repair.  He certainly has multiple medical comorbidities which are concern for healing including morbid obesity, diabetes, among others.        Operative Findings:  Rotator Cuff Tissue Quality: Chronic  dysplastic features significant retraction     Status of the Rotator Cuff:  Supraspinatus - large to massive full-thickness  Infraspinatus - large to massive full-thickness  Subscapularis - intact  Teres minor - no tear     Size of Rotator Cuff Tear:  Combined supraspinatus tear with extension into the infraspinatus - 40 mm with significant medial retraction  Teres minor - no tear     Rotator Cuff Repair Construct:  3x2 Transosseous Equivalent Double Row Arthroscopic Rotator Cuff Repair   Utilized all suture limbs from the anterior 2 anchors and then utilized 1 suture from the posterior anchor and tied to the posterior most portion     Rotator Cuff Implants:  Medial Row: 3 Medial Anchors - [employer] 4.5mm Healicoil PEEK   Lateral Row: 2 Lateral Anchors - [employer] 5.5mm Footprint PEEK      Bone Quality: Good     Labrum: Small degenerative SLAP tear biceps otherwise excellent appearing     Humeral Head: 0  Glenoid: 0     Contracture: Negative  Pathological laxity: Negative     Procedure in detail:  Regional interscalene block was completed in the preoperative area by the anesthesia team.  The patient was supine on the operative table and the anesthesia team initiated general anesthesia.  The patient was placed in a modified beach chair position with the neck secured in neutral alignment and all bony prominences were well padded.     The shoulder was assessed with an examination under anesthesia.     The operative extremity was cleaned with alcohol and then the extremity was prepped and draped in the standard fashion.  The surgical arm was placed into a well-padded arm nails. A standard posterior portal was created with an incision made 1 cm inferior 1 cm medial to the posterolateral acromial corner.  A blunt metal trocar and cannula were inserted into the glenohumeral joint.  The arthroscopic pump was connected and the above findings and diagnoses were noted as part of the diagnostic shoulder arthroscopy.        A spinal needle was used to localize the anterior portal position in the rotator interval. A 5.5mm plastic cannula and trocar were inserted followed by a 4.5mm shaver/cautery device.  The shaver was used for debridement in the glenohumeral joint.  The biceps tendon was inspected and did not necessitate biceps tenodesis given the satisfactory intraoperative findings.     The arthroscope and metal cannula were then removed in order to transition to the subacromial space.  The blunt metal trocar and cannula were inserted into the subacromial space and the lateral portal site identified with a spinal needle.  An 8 mm plastic cannula and trocar were inserted.  I turned my attention to the arthroscopic subacromial decompression with acromioplasty. Bursitis was noted in the subacromial space and impacted visualization of the rotator cuff.  The 4.5mm shaver/cautery device was used to clear the subacromial space until the acromion could be identified and bursal resection was completed to allow rotator cuff visualization.  The shaver was used to remove fibrous tissue from the acromial undersurface until the anterolateral and anterior medial corners of the acromion were clearly identified.  The coracoacromial ligament was not released.  The shaver was used to perform a minimal acromioplasty.  The shaver/cautery device was used to perform the subacromial decompression with minimal acromioplasty.    Significant chronic dysplastic features were noted with severe medialization reasonable mobility after releases.      I turned my attention to the rotator cuff tear and the arthroscopic rotator cuff repair.  The torn rotator cuff tendon was grasped with a handheld grasper and assessed for mobility and integrity.  The soft tissue at the greater tuberosity insertion site was removed and a power shaver was used to create a healthy bleeding bed to enhance rotator cuff tendon healing.     Arthroscopic rotator cuff suture anchors were  then inserted for the rotator cuff repair.  The three medial row arthroscopic rotator cuff repair anchors were inserted first from anterior to posterior and the sutures from the anchors were withdrawn through the anterior cannula. An arthroscopic suture passer was used to place the high strength sutures through the rotator cuff tendon in an inverted mattress fashion. The sutures were then inserted laterally into 2 lateral knotless anchors with appropriate tensioning -nice reduction of the tissue despite the chronicity.    I tied the posterior white sutures in the posterior most medial row.  The completed arthroscopic rotator cuff repair was inspected dynamically and the arthroscopic instruments removed along with the arthroscopic fluid. The incisions were closed with nylon suture and steri-strips were placed over the incisions.  A sterile dressing was applied followed by a neutral rotation sling.  The patient tolerated the procedure well and was transported to the recovery room in satisfactory condition.        Postoperative Plan:  Neutral rotation sling  Non-weight bearing  No biceps loading  Pendulums, elbow, wrist, and hand exercises encouraged  Clinic follow-up appointment scheduled for 2 weeks after surgery    POSTOPERATIVE PLAN:  Follow-up in the office in 2 weeks with 3 views of the operative shoulder at that time  Sutures: Nylon sutures will be removed in clinic in 2 weeks and replaced with Steri-Strips  DVT prophylaxis: Patient is already on aspirin and Plavix and can resume on postop day 0, he continued his aspirin and can resume both postop day 0  Weightbearing: Nonweightbearing on operative extremity  Sling for 3 to 4 weeks following the surgery  Physical therapy: Formal outpatient physical therapy will be provided at the 2-week appointment in the office.        Electronically signed by Syd Blas MD, 01/12/22, 8:42 AM EST.

## 2022-01-13 ENCOUNTER — TELEPHONE (OUTPATIENT)
Dept: ORTHOPEDIC SURGERY | Facility: CLINIC | Age: 52
End: 2022-01-13

## 2022-01-13 NOTE — TELEPHONE ENCOUNTER
The patient says that he is in a lot of pain.  The pain medicine is not helping at all. He is taking it as directed and icing it and getting no relief.  Please advise.  Thank you.  Charlotte

## 2022-01-13 NOTE — TELEPHONE ENCOUNTER
Pt called in to state that he had SX yesterday and is in a lot of pain. Hasn't been able to sleep. Wanted to know if a stronger pain med could be called in. Would also like some clinical advice.

## 2022-01-13 NOTE — TELEPHONE ENCOUNTER
Pt wife, Alisa called in requesting work note for patients time off work following surgery yesterday. They are requesting a note with  estimate of total time off.       Fax letter to [phone] Attn Sheila Cuellar would also like a call back before faxing letter to let them know what time frame was approved by Dr. lBas.

## 2022-01-13 NOTE — TELEPHONE ENCOUNTER
Syd Blas MD  You 12 minutes ago (3:14 PM)     BM    10 mg of Norco is incredibly strong/or go to after all of our shoulder surgeries.  His block has worn off.  He may just need to take the Norco more frequently.  Instead of every 4 hours he can take 1 tablet every 3 hours for the next 24 hours.  Icing can help as well.  If he is going to run out of the medicine just have him give us 1 to 2-day notice so we can go ahead and send in new prescription.    Message text      I called and let him know.  He understands.  Charlotte

## 2022-01-14 ENCOUNTER — TELEPHONE (OUTPATIENT)
Dept: ORTHOPEDIC SURGERY | Facility: CLINIC | Age: 52
End: 2022-01-14

## 2022-01-14 NOTE — TELEPHONE ENCOUNTER
Patient called and stated he had surgery on his left shoulder last week, he has some questions he would like to discuss, he would like a return phone call [phone]

## 2022-01-14 NOTE — TELEPHONE ENCOUNTER
Spoke with pt regarding previous message; He has some questions about the sling and whether he could get an alternative/more light weight sling because he is having trouble sleeping due. I explained to pt that unfortunately this specific sling is needed due to the abduction pillow and also that he needs to wear the sling for 4 weeks after surgery; He understood. He was also asking if we could fax an updated work note to his  due to this being billed through [employer] insurance.     Needs to note to state he is off work until follow up on 1/27/22 and will be re-evaluated at that time. Letter can be faxed to [phone] Attn: Paris Gao

## 2022-01-14 NOTE — TELEPHONE ENCOUNTER
Can we type up a letter for this pt with the following info:    Pt is to be off work until follow up on 1/27/22 and will be re-evaluated at that time. Letter can be faxed to [phone] Attn: Paris       Thanks!  Sima

## 2022-01-16 ENCOUNTER — APPOINTMENT (OUTPATIENT)
Dept: GENERAL RADIOLOGY | Facility: HOSPITAL | Age: 52
End: 2022-01-16

## 2022-01-16 ENCOUNTER — APPOINTMENT (OUTPATIENT)
Dept: CT IMAGING | Facility: HOSPITAL | Age: 52
End: 2022-01-16

## 2022-01-16 ENCOUNTER — HOSPITAL ENCOUNTER (EMERGENCY)
Facility: HOSPITAL | Age: 52
Discharge: HOME | End: 2022-01-16
Attending: STUDENT IN AN ORGANIZED HEALTH CARE EDUCATION/TRAINING PROGRAM | Admitting: STUDENT IN AN ORGANIZED HEALTH CARE EDUCATION/TRAINING PROGRAM

## 2022-01-16 VITALS
HEIGHT: 68 IN | WEIGHT: 240 LBS | BODY MASS INDEX: 36.37 KG/M2 | OXYGEN SATURATION: 98 % | RESPIRATION RATE: 18 BRPM | SYSTOLIC BLOOD PRESSURE: 132 MMHG | TEMPERATURE: 96.9 F | DIASTOLIC BLOOD PRESSURE: 95 MMHG | HEART RATE: 105 BPM

## 2022-01-16 DIAGNOSIS — U07.1 COVID-19: Primary | ICD-10-CM

## 2022-01-16 LAB
A-A DO2: 23.6 MMHG (ref 0–300)
ALBUMIN SERPL-MCNC: 4.52 G/DL (ref 3.5–5.2)
ALBUMIN/GLOB SERPL: 1.5 G/DL
ALP SERPL-CCNC: 56 U/L (ref 39–117)
ALT SERPL W P-5'-P-CCNC: 35 U/L (ref 1–41)
ANION GAP SERPL CALCULATED.3IONS-SCNC: 15.9 MMOL/L (ref 5–15)
APTT PPP: 27.1 SECONDS (ref 25.5–35.4)
ARTERIAL PATENCY WRIST A: ABNORMAL
AST SERPL-CCNC: 27 U/L (ref 1–40)
ATMOSPHERIC PRESS: 715 MMHG
BASE EXCESS BLDA CALC-SCNC: -1.4 MMOL/L (ref 0–2)
BASOPHILS # BLD AUTO: 0.02 10*3/MM3 (ref 0–0.2)
BASOPHILS NFR BLD AUTO: 0.3 % (ref 0–1.5)
BDY SITE: ABNORMAL
BILIRUB SERPL-MCNC: 0.6 MG/DL (ref 0–1.2)
BODY TEMPERATURE: 0 C
BUN SERPL-MCNC: 21 MG/DL (ref 6–20)
BUN/CREAT SERPL: 15.9 (ref 7–25)
CALCIUM SPEC-SCNC: 9.6 MG/DL (ref 8.6–10.5)
CHLORIDE SERPL-SCNC: 104 MMOL/L (ref 98–107)
CO2 BLDA-SCNC: 21.7 MMOL/L (ref 22–33)
CO2 SERPL-SCNC: 19.1 MMOL/L (ref 22–29)
COHGB MFR BLD: 0.7 % (ref 0–5)
CREAT SERPL-MCNC: 1.32 MG/DL (ref 0.76–1.27)
CRP SERPL-MCNC: <0.3 MG/DL (ref 0–0.5)
DEPRECATED RDW RBC AUTO: 41.5 FL (ref 37–54)
EOSINOPHIL # BLD AUTO: 0.03 10*3/MM3 (ref 0–0.4)
EOSINOPHIL NFR BLD AUTO: 0.5 % (ref 0.3–6.2)
ERYTHROCYTE [DISTWIDTH] IN BLOOD BY AUTOMATED COUNT: 13.2 % (ref 12.3–15.4)
ERYTHROCYTE [SEDIMENTATION RATE] IN BLOOD: 16 MM/HR (ref 0–20)
FLUAV RNA RESP QL NAA+PROBE: NOT DETECTED
FLUBV RNA RESP QL NAA+PROBE: NOT DETECTED
GFR SERPL CREATININE-BSD FRML MDRD: 57 ML/MIN/1.73
GLOBULIN UR ELPH-MCNC: 3 GM/DL
GLUCOSE SERPL-MCNC: 126 MG/DL (ref 65–99)
HCO3 BLDA-SCNC: 20.8 MMOL/L (ref 20–26)
HCT VFR BLD AUTO: 48.5 % (ref 37.5–51)
HCT VFR BLD CALC: 49.6 % (ref 38–51)
HGB BLD-MCNC: 16 G/DL (ref 13–17.7)
HGB BLDA-MCNC: 16.2 G/DL (ref 14–18)
HOLD SPECIMEN: NORMAL
HOLD SPECIMEN: NORMAL
IMM GRANULOCYTES # BLD AUTO: 0.03 10*3/MM3 (ref 0–0.05)
IMM GRANULOCYTES NFR BLD AUTO: 0.5 % (ref 0–0.5)
INHALED O2 CONCENTRATION: 21 %
INR PPP: 0.86 (ref 0.9–1.1)
LYMPHOCYTES # BLD AUTO: 1.35 10*3/MM3 (ref 0.7–3.1)
LYMPHOCYTES NFR BLD AUTO: 21 % (ref 19.6–45.3)
Lab: ABNORMAL
MCH RBC QN AUTO: 28.6 PG (ref 26.6–33)
MCHC RBC AUTO-ENTMCNC: 33 G/DL (ref 31.5–35.7)
MCV RBC AUTO: 86.6 FL (ref 79–97)
METHGB BLD QL: 0 % (ref 0–3)
MODALITY: ABNORMAL
MONOCYTES # BLD AUTO: 0.84 10*3/MM3 (ref 0.1–0.9)
MONOCYTES NFR BLD AUTO: 13.1 % (ref 5–12)
NEUTROPHILS NFR BLD AUTO: 4.15 10*3/MM3 (ref 1.7–7)
NEUTROPHILS NFR BLD AUTO: 64.6 % (ref 42.7–76)
NOTE: ABNORMAL
NOTIFIED BY: ABNORMAL
NOTIFIED WHO: ABNORMAL
NRBC BLD AUTO-RTO: 0 /100 WBC (ref 0–0.2)
NT-PROBNP SERPL-MCNC: 22.9 PG/ML (ref 0–900)
OXYHGB MFR BLDV: 96.5 % (ref 94–99)
PCO2 BLDA: 28.6 MM HG (ref 35–45)
PCO2 TEMP ADJ BLD: ABNORMAL MM[HG]
PH BLDA: 7.47 PH UNITS (ref 7.35–7.45)
PH, TEMP CORRECTED: ABNORMAL
PLATELET # BLD AUTO: 237 10*3/MM3 (ref 140–450)
PMV BLD AUTO: 9.3 FL (ref 6–12)
PO2 BLDA: 84.3 MM HG (ref 83–108)
PO2 TEMP ADJ BLD: ABNORMAL MM[HG]
POTASSIUM SERPL-SCNC: 4 MMOL/L (ref 3.5–5.2)
PROT SERPL-MCNC: 7.5 G/DL (ref 6–8.5)
PROTHROMBIN TIME: 12.1 SECONDS (ref 12.8–14.5)
QT INTERVAL: 280 MS
QTC INTERVAL: 428 MS
RBC # BLD AUTO: 5.6 10*6/MM3 (ref 4.14–5.8)
SAO2 % BLDCOA: 97.2 % (ref 94–99)
SARS-COV-2 RNA RESP QL NAA+PROBE: DETECTED
SODIUM SERPL-SCNC: 139 MMOL/L (ref 136–145)
TROPONIN T SERPL-MCNC: <0.01 NG/ML (ref 0–0.03)
VENTILATOR MODE: ABNORMAL
WBC NRBC COR # BLD: 6.42 10*3/MM3 (ref 3.4–10.8)
WHOLE BLOOD HOLD SPECIMEN: NORMAL
WHOLE BLOOD HOLD SPECIMEN: NORMAL

## 2022-01-16 PROCEDURE — 87636 SARSCOV2 & INF A&B AMP PRB: CPT | Performed by: PHYSICIAN ASSISTANT

## 2022-01-16 PROCEDURE — 80053 COMPREHEN METABOLIC PANEL: CPT | Performed by: PHYSICIAN ASSISTANT

## 2022-01-16 PROCEDURE — 85025 COMPLETE CBC W/AUTO DIFF WBC: CPT | Performed by: PHYSICIAN ASSISTANT

## 2022-01-16 PROCEDURE — 36600 WITHDRAWAL OF ARTERIAL BLOOD: CPT

## 2022-01-16 PROCEDURE — 25010000002 ONDANSETRON PER 1 MG: Performed by: STUDENT IN AN ORGANIZED HEALTH CARE EDUCATION/TRAINING PROGRAM

## 2022-01-16 PROCEDURE — 71275 CT ANGIOGRAPHY CHEST: CPT

## 2022-01-16 PROCEDURE — 83050 HGB METHEMOGLOBIN QUAN: CPT

## 2022-01-16 PROCEDURE — 86140 C-REACTIVE PROTEIN: CPT | Performed by: PHYSICIAN ASSISTANT

## 2022-01-16 PROCEDURE — 25010000002 MORPHINE PER 10 MG: Performed by: STUDENT IN AN ORGANIZED HEALTH CARE EDUCATION/TRAINING PROGRAM

## 2022-01-16 PROCEDURE — 85610 PROTHROMBIN TIME: CPT | Performed by: PHYSICIAN ASSISTANT

## 2022-01-16 PROCEDURE — 71045 X-RAY EXAM CHEST 1 VIEW: CPT

## 2022-01-16 PROCEDURE — 83880 ASSAY OF NATRIURETIC PEPTIDE: CPT | Performed by: PHYSICIAN ASSISTANT

## 2022-01-16 PROCEDURE — 85730 THROMBOPLASTIN TIME PARTIAL: CPT | Performed by: PHYSICIAN ASSISTANT

## 2022-01-16 PROCEDURE — 96375 TX/PRO/DX INJ NEW DRUG ADDON: CPT

## 2022-01-16 PROCEDURE — 96374 THER/PROPH/DIAG INJ IV PUSH: CPT

## 2022-01-16 PROCEDURE — 85652 RBC SED RATE AUTOMATED: CPT | Performed by: PHYSICIAN ASSISTANT

## 2022-01-16 PROCEDURE — 82805 BLOOD GASES W/O2 SATURATION: CPT

## 2022-01-16 PROCEDURE — 99283 EMERGENCY DEPT VISIT LOW MDM: CPT

## 2022-01-16 PROCEDURE — 93005 ELECTROCARDIOGRAM TRACING: CPT | Performed by: PHYSICIAN ASSISTANT

## 2022-01-16 PROCEDURE — 82375 ASSAY CARBOXYHB QUANT: CPT

## 2022-01-16 PROCEDURE — 84484 ASSAY OF TROPONIN QUANT: CPT | Performed by: PHYSICIAN ASSISTANT

## 2022-01-16 PROCEDURE — 0 IOPAMIDOL PER 1 ML: Performed by: STUDENT IN AN ORGANIZED HEALTH CARE EDUCATION/TRAINING PROGRAM

## 2022-01-16 RX ORDER — ONDANSETRON 2 MG/ML
4 INJECTION INTRAMUSCULAR; INTRAVENOUS ONCE
Status: COMPLETED | OUTPATIENT
Start: 2022-01-16 | End: 2022-01-16

## 2022-01-16 RX ADMIN — SODIUM CHLORIDE 1000 ML: 9 INJECTION, SOLUTION INTRAVENOUS at 18:29

## 2022-01-16 RX ADMIN — ONDANSETRON 4 MG: 2 INJECTION INTRAMUSCULAR; INTRAVENOUS at 18:30

## 2022-01-16 RX ADMIN — MORPHINE SULFATE 4 MG: 4 INJECTION, SOLUTION INTRAMUSCULAR; INTRAVENOUS at 18:31

## 2022-01-16 RX ADMIN — IOPAMIDOL 70 ML: 755 INJECTION, SOLUTION INTRAVENOUS at 20:25

## 2022-01-17 NOTE — ED PROVIDER NOTES
Subjective   51-year-old white male presents secondary to shortness of breath.  Patient states that he had left shoulder surgery on Wednesday.  He states has been short of breath since then.  He denies any fever.  He denies any redness or swelling at the site.  He states he has had some pain in this surgical site however not unbearable.  He denies any chest pain pressure tightness or squeezing.  He has no history of PE or DVT.  He denies any lower extremity swelling or pain.  He denies any abdominal pain.  No nausea vomiting or diarrhea.  No other complaints at this time.  He denies any exposure to COVID or COVID symptoms otherwise.  He reports a minimal cough          Review of Systems   Constitutional: Negative.  Negative for fever.   HENT: Negative.    Respiratory: Positive for shortness of breath.    Cardiovascular: Negative.  Negative for chest pain.   Gastrointestinal: Negative for abdominal pain, diarrhea, nausea and vomiting.   Endocrine: Negative.    Genitourinary: Negative.  Negative for dysuria.   Skin: Negative.    Neurological: Negative.    Psychiatric/Behavioral: Negative.    All other systems reviewed and are negative.      Past Medical History:   Diagnosis Date   • Arthritis    • Coronary artery disease    • Diabetes mellitus (HCC)    • Elevated cholesterol    • Fracture    • GERD (gastroesophageal reflux disease)    • Hyperlipidemia    • Hypertension    • Kidney stones    • Sleep apnea     no c-pap       Allergies   Allergen Reactions   • Penicillins Hives       Past Surgical History:   Procedure Laterality Date   • CARDIAC CATHETERIZATION     • COLONOSCOPY     • CORONARY STENT PLACEMENT  2015    x1 stent [place] facility   • EXTRACORPOREAL SHOCK WAVE LITHOTRIPSY (ESWL) Right 8/21/2020    Procedure: EXTRACORPOREAL SHOCKWAVE LITHOTRIPSY;  Surgeon: Pablo Crenshaw MD;  Location: [facility];  Service: Urology;  Laterality: Right;   • EXTRACORPOREAL SHOCK WAVE LITHOTRIPSY (ESWL) Right 12/4/2020     Procedure: EXTRACORPOREAL SHOCKWAVE LITHOTRIPSY;  Surgeon: Pablo Crenshaw MD;  Location: [facility] OR;  Service: Urology;  Laterality: Right;   • EXTRACORPOREAL SHOCK WAVE LITHOTRIPSY (ESWL) Right 2021    Procedure: EXTRACORPOREAL SHOCKWAVE LITHOTRIPSY;  Surgeon: Pablo Crenshaw MD;  Location: [facility] OR;  Service: Urology;  Laterality: Right;   • FRACTURE SURGERY Right     orif-ankle   • ORIF FIBULA FRACTURE Right 2019    Procedure: OPEN REDUCTION INTERNAL FIXATION RIGHT DISTAL FIBULA;  Surgeon: Chrsitian Brooks MD;  Location: [facility] OR;  Service: Orthopedics       Family History   Problem Relation Age of Onset   • Heart disease Father    • Heart attack Maternal Grandmother        Social History     Socioeconomic History   • Marital status:    Tobacco Use   • Smoking status: Former Smoker     Packs/day: 0.50     Years: 10.00     Pack years: 5.00     Types: Cigarettes     Quit date:      Years since quittin.0   • Smokeless tobacco: Never Used   Vaping Use   • Vaping Use: Never used   Substance and Sexual Activity   • Alcohol use: Yes     Comment: social   • Drug use: Never   • Sexual activity: Defer           Objective   Physical Exam  Vitals and nursing note reviewed.   Constitutional:       General: He is not in acute distress.     Appearance: He is well-developed. He is not diaphoretic.   HENT:      Head: Normocephalic and atraumatic.      Right Ear: External ear normal.      Left Ear: External ear normal.      Nose: Nose normal.   Eyes:      Conjunctiva/sclera: Conjunctivae normal.      Pupils: Pupils are equal, round, and reactive to light.   Neck:      Vascular: No JVD.      Trachea: No tracheal deviation.   Cardiovascular:      Rate and Rhythm: Normal rate and regular rhythm.      Heart sounds: Normal heart sounds. No murmur heard.      Pulmonary:      Effort: Pulmonary effort is normal. No respiratory distress.      Breath sounds: Normal breath sounds. No  wheezing.   Abdominal:      General: Bowel sounds are normal.      Palpations: Abdomen is soft.      Tenderness: There is no abdominal tenderness.   Musculoskeletal:         General: No deformity. Normal range of motion.      Cervical back: Normal range of motion and neck supple.   Skin:     General: Skin is warm and dry.      Coloration: Skin is not pale.      Findings: No erythema or rash.   Neurological:      Mental Status: He is alert and oriented to person, place, and time.      Cranial Nerves: No cranial nerve deficit.   Psychiatric:         Behavior: Behavior normal.         Thought Content: Thought content normal.         Procedures       Results for orders placed or performed during the hospital encounter of 01/16/22   COVID-19 and FLU A/B PCR - Swab, Nasopharynx    Specimen: Nasopharynx; Swab   Result Value Ref Range    COVID19 Detected (C) Not Detected - Ref. Range    Influenza A PCR Not Detected Not Detected    Influenza B PCR Not Detected Not Detected   Comprehensive Metabolic Panel    Specimen: Blood   Result Value Ref Range    Glucose 126 (H) 65 - 99 mg/dL    BUN 21 (H) 6 - 20 mg/dL    Creatinine 1.32 (H) 0.76 - 1.27 mg/dL    Sodium 139 136 - 145 mmol/L    Potassium 4.0 3.5 - 5.2 mmol/L    Chloride 104 98 - 107 mmol/L    CO2 19.1 (L) 22.0 - 29.0 mmol/L    Calcium 9.6 8.6 - 10.5 mg/dL    Total Protein 7.5 6.0 - 8.5 g/dL    Albumin 4.52 3.50 - 5.20 g/dL    ALT (SGPT) 35 1 - 41 U/L    AST (SGOT) 27 1 - 40 U/L    Alkaline Phosphatase 56 39 - 117 U/L    Total Bilirubin 0.6 0.0 - 1.2 mg/dL    eGFR Non African Amer 57 (L) >60 mL/min/1.73    Globulin 3.0 gm/dL    A/G Ratio 1.5 g/dL    BUN/Creatinine Ratio 15.9 7.0 - 25.0    Anion Gap 15.9 (H) 5.0 - 15.0 mmol/L   aPTT    Specimen: Blood   Result Value Ref Range    PTT 27.1 25.5 - 35.4 seconds   Protime-INR    Specimen: Blood   Result Value Ref Range    Protime 12.1 (L) 12.8 - 14.5 Seconds    INR 0.86 (L) 0.90 - 1.10   BNP    Specimen: Blood   Result Value Ref  Range    proBNP 22.9 0.0 - 900.0 pg/mL   Troponin    Specimen: Blood   Result Value Ref Range    Troponin T <0.010 0.000 - 0.030 ng/mL   Sedimentation Rate    Specimen: Blood   Result Value Ref Range    Sed Rate 16 0 - 20 mm/hr   C-reactive Protein    Specimen: Blood   Result Value Ref Range    C-Reactive Protein <0.30 0.00 - 0.50 mg/dL   CBC Auto Differential    Specimen: Blood   Result Value Ref Range    WBC 6.42 3.40 - 10.80 10*3/mm3    RBC 5.60 4.14 - 5.80 10*6/mm3    Hemoglobin 16.0 13.0 - 17.7 g/dL    Hematocrit 48.5 37.5 - 51.0 %    MCV 86.6 79.0 - 97.0 fL    MCH 28.6 26.6 - 33.0 pg    MCHC 33.0 31.5 - 35.7 g/dL    RDW 13.2 12.3 - 15.4 %    RDW-SD 41.5 37.0 - 54.0 fl    MPV 9.3 6.0 - 12.0 fL    Platelets 237 140 - 450 10*3/mm3    Neutrophil % 64.6 42.7 - 76.0 %    Lymphocyte % 21.0 19.6 - 45.3 %    Monocyte % 13.1 (H) 5.0 - 12.0 %    Eosinophil % 0.5 0.3 - 6.2 %    Basophil % 0.3 0.0 - 1.5 %    Immature Grans % 0.5 0.0 - 0.5 %    Neutrophils, Absolute 4.15 1.70 - 7.00 10*3/mm3    Lymphocytes, Absolute 1.35 0.70 - 3.10 10*3/mm3    Monocytes, Absolute 0.84 0.10 - 0.90 10*3/mm3    Eosinophils, Absolute 0.03 0.00 - 0.40 10*3/mm3    Basophils, Absolute 0.02 0.00 - 0.20 10*3/mm3    Immature Grans, Absolute 0.03 0.00 - 0.05 10*3/mm3    nRBC 0.0 0.0 - 0.2 /100 WBC   Blood Gas, Arterial With Co-Ox    Specimen: Arterial Blood   Result Value Ref Range    Site Right Brachial     Antonio's Test N/A     pH, Arterial 7.470 (H) 7.350 - 7.450 pH units    pCO2, Arterial 28.6 (L) 35.0 - 45.0 mm Hg    pO2, Arterial 84.3 83.0 - 108.0 mm Hg    HCO3, Arterial 20.8 20.0 - 26.0 mmol/L    Base Excess, Arterial -1.4 (L) 0.0 - 2.0 mmol/L    O2 Saturation, Arterial 97.2 94.0 - 99.0 %    Hemoglobin, Blood Gas 16.2 14 - 18 g/dL    Hematocrit, Blood Gas 49.6 38.0 - 51.0 %    Oxyhemoglobin 96.5 94 - 99 %    Methemoglobin 0.00 0.00 - 3.00 %    Carboxyhemoglobin 0.7 0 - 5 %    A-a Gradiant 23.6 0.0 - 300.0 mmHg    CO2 Content 21.7 (L) 22 - 33  mmol/L    Temperature 0.0 C    Barometric Pressure for Blood Gas 715 mmHg    Modality Room Air     FIO2 21 %    Ventilator Mode NA     Note Read back and acknowledge     Notified Who JOHN JOY // RN      Notified By SUBHASH     Collected by [id number]     pH, Temp Corrected      pCO2, Temperature Corrected      pO2, Temperature Corrected     ECG 12 Lead   Result Value Ref Range    QT Interval 280 ms    QTC Interval 428 ms   Green Top (Gel)   Result Value Ref Range    Extra Tube Hold for add-ons.    Lavender Top   Result Value Ref Range    Extra Tube hold for add-on    Gold Top - SST   Result Value Ref Range    Extra Tube Hold for add-ons.    Light Blue Top   Result Value Ref Range    Extra Tube hold for add-on      CT Chest Pulmonary Embolism   Final Result      1. No evidence of pulmonary embolism.   2. Minimal patchy groundglass change in the dependent portions of the right middle lobe and lingula as well as the posterior superior segment of the left lower lobe. These changes are nonspecific and could represent atelectasis versus minimal COVID-19   pneumonia.      Signer Name: Carri Romano MD    Signed: 1/16/2022 8:38 PM    Workstation Name: [id number]     [facility]iology [facility]      XR Chest 1 View   Final Result   Film is limited by rightward rotation of the patient. No acute cardiopulmonary changes.      Signer Name: Carri Romano MD    Signed: 1/16/2022 6:49 PM    Workstation Name: [id number]     [facility]          ED Course  ED Course as of 01/16/22 2135   Sun Jan 16, 2022   1807 EKG at 1751 sinus tachycardia 141 bpm, , cures 78, QTc 428, regular axis.  No significant ST deviation or T wave abnormalities concerning for acute ischemia.No specific signs of right heart strain. [KP]   1949 Signed out to Miryam Morgan [JI]   2133 CT Chest Pulmonary Embolism  IMPRESSION:     1. No evidence of pulmonary embolism.  2. Minimal patchy groundglass change in the  dependent portions of the right middle lobe and lingula as well as the posterior superior segment of the left lower lobe. These changes are nonspecific and could represent atelectasis versus minimal COVID-19  pneumonia.     Signer Name: Carri Romano MD   Signed: 1/16/2022 8:38 PM   Workstation Name: [id number]    [facility] []      ED Course User Index  [JI] Fernando Wei PA  [] Logan Bishop MD  [] Miryam Morgan, APRN                                                 [facility]    Final diagnoses:   COVID-19       ED Disposition  ED Disposition     ED Disposition Condition Comment    Discharge Stable           Iram, April, APRN  [address] [facility] [address]  [phone]    Schedule an appointment as soon as possible for a visit in 2 days           Medication List      Changed    glucose blood test strip  Use as directed to test Blood Sugar 2 times a day.  What changed: additional instructions     True Metrix Meter w/Device kit  Use as directed to test Blood Sugar.  What changed: additional instructions     TRUEplus Lancets 28G misc  Use as Directed to test Blood Sugar 2 times a day  What changed: additional instructions             Miryam Morgan, LIAM  01/16/22 2733

## 2022-01-27 ENCOUNTER — OFFICE VISIT (OUTPATIENT)
Dept: ORTHOPEDIC SURGERY | Facility: CLINIC | Age: 52
End: 2022-01-27

## 2022-01-27 VITALS — TEMPERATURE: 97.3 F

## 2022-01-27 DIAGNOSIS — Z98.890 S/P ARTHROSCOPY OF LEFT SHOULDER: Primary | ICD-10-CM

## 2022-01-27 DIAGNOSIS — M75.42 IMPINGEMENT SYNDROME OF LEFT SHOULDER: ICD-10-CM

## 2022-01-27 DIAGNOSIS — S46.012D TRAUMATIC COMPLETE TEAR OF LEFT ROTATOR CUFF, SUBSEQUENT ENCOUNTER: ICD-10-CM

## 2022-01-27 PROCEDURE — 99024 POSTOP FOLLOW-UP VISIT: CPT | Performed by: PHYSICIAN ASSISTANT

## 2022-01-27 RX ORDER — CLOPIDOGREL BISULFATE 75 MG/1
TABLET ORAL
COMMUNITY
Start: 2022-01-18

## 2022-01-27 RX ORDER — HYDROXYZINE HYDROCHLORIDE 10 MG/1
TABLET, FILM COATED ORAL
COMMUNITY
Start: 2022-01-15

## 2022-01-27 NOTE — PROGRESS NOTES
[facility] Note        Subjective     Post-op (2 weeks status post Status post Left Arthroscopic rotator cuff repair 1/12/22)       ORIANA Sneed is a 51 y.o. male.  Patient presents for their initial postop visit following left shoulder arthroscopy with arthroscopic rotator cuff repair and SAD performed on the above date by Dr. Blas.    Pain scale: 8/10.  No new numbness or tingling into the extremity other than what he had prior to surgery.  He has been wearing his postop sling as directed.    Patient denies any fever, chills, night sweats or other constitutional symptoms.          Objective      Physical Exam  Temp 97.3 °F (36.3 °C)     There is no height or weight on file to calculate BMI.        Ortho Exam  Peripheral Vascular   Left Upper Extremity    No cyanotic nail beds    Pink nail beds and rapid capillary refill   Palpation    Radial Pulse - Bilaterally normal    Musculoskeletal   Upper Extremity   Left Shoulder    Inspection and palpation:    Tenderness -moderate tenderness noted throughout the shoulder    Swelling -positive    Sensation -patient notes decreased sensation noted throughout the extremity.  He reports that this is the same as what he had prior to surgery from other injuries    Incision--healing appropriately with sutures in place.  No redness, warmth, drainage or evidence of infection.      Imaging Reviewed:  No new imaging today.      Assessment:  1. S/P arthroscopy of left shoulder    2. Traumatic complete tear of left rotator cuff, subsequent encounter    3. Impingement syndrome of left shoulder        Plan:  Status post left shoulder arthroscopy with arthroscopic rotator cuff repair (supraspinatus, infraspinatus) and SAD, stable.  Sutures were removed today.  Reviewed arthroscopic images with patient.  Ordered formal PT to be completed in [place].  Patient was also provided Dr. Blas is rotator cuff rehab protocol to give to the therapist.  He will  continue wearing the postoperative sling as directed by Dr. Blas in the rehab protocol until he is approximately 4 weeks out from surgery.  This does include sleeping in the brace.  Recommend over-the-counter pain medication as needed.  Follow up in 2 months with Dr. Blas for repeat evaluation.  Questions and concerns answered.    Patient was also examined by Dr. Blas and he agrees with the above assessment and plan.      April Ott PA-C  01/28/22  14:57 EST      Dictated Utilizing Dragon Dictation.

## 2022-02-14 ENCOUNTER — TELEPHONE (OUTPATIENT)
Dept: ORTHOPEDIC SURGERY | Facility: CLINIC | Age: 52
End: 2022-02-14

## 2022-02-14 NOTE — TELEPHONE ENCOUNTER
April,    Patient is almost 5 weeks post op left shoulder rotator cuff repair. He needs to go back to work. Ok to release with restrictions? He is a  and they will give him a helper to lift things. Please advise thank you!  Leah

## 2022-02-14 NOTE — TELEPHONE ENCOUNTER
April,    Can you please type up a letter for this patient that he can return to work ( whatever date he wants is fine) with restrictions no lifting with left arm for 3 months post operative. He may not lift or pull up with the arm. Please call patient and let him know when this is ready. Thanks!  Leah

## 2022-02-14 NOTE — TELEPHONE ENCOUNTER
Syd Blas MD Dickinson, Catherine R, PA-C 19 minutes ago (11:12 AM)     BM    He has massive chronic tears. He cannot lift more than 2 pounds until 3 months postop. If he has someone to help him make sure he does not have to lift or pull up with the arm and its an automatic and that would be reasonable. He definitely cannot lift with the arm.    Message text

## 2022-02-14 NOTE — TELEPHONE ENCOUNTER
Pt called in to state that he is financially drained and was wondering if he could be released back to work. Would like a call back.

## 2022-03-02 ENCOUNTER — TELEPHONE (OUTPATIENT)
Dept: ORTHOPEDIC SURGERY | Facility: CLINIC | Age: 52
End: 2022-03-02

## 2022-03-04 NOTE — TELEPHONE ENCOUNTER
I called [facility] as I have been unable to get in touch with Mr. Sneed himself. They did verify that they do haven on file an authorization to be treated from [employer] and a back up authorization from his [employer] as a secondary. I attempted to call patient's emergency contact as [facility] said he no showed his last 2 appointments and I have not been able to get in touch with him either. His wife did answer the phone and I confirmed his phone number was correct and asked her to have him call us.   Leah Fry RT (R), ROT

## 2022-03-07 NOTE — TELEPHONE ENCOUNTER
Spoke with Pt to inform him that authorization was confirmed at [facility] through [employer] and secondary coverage through [employer].    Advised him to give [facility] a call to schedule appointment.

## 2022-04-01 ENCOUNTER — TELEPHONE (OUTPATIENT)
Dept: ORTHOPEDIC SURGERY | Facility: CLINIC | Age: 52
End: 2022-04-01

## 2023-01-24 ENCOUNTER — HOSPITAL ENCOUNTER (OUTPATIENT)
Dept: GENERAL RADIOLOGY | Facility: HOSPITAL | Age: 53
Discharge: HOME | End: 2023-01-24
Admitting: NURSE PRACTITIONER
Payer: COMMERCIAL

## 2023-01-24 ENCOUNTER — OFFICE VISIT (OUTPATIENT)
Dept: UROLOGY | Facility: CLINIC | Age: 53
End: 2023-01-24
Payer: COMMERCIAL

## 2023-01-24 VITALS — HEIGHT: 68 IN | WEIGHT: 241 LBS | BODY MASS INDEX: 36.53 KG/M2

## 2023-01-24 DIAGNOSIS — R39.14 BENIGN PROSTATIC HYPERPLASIA WITH INCOMPLETE BLADDER EMPTYING: ICD-10-CM

## 2023-01-24 DIAGNOSIS — N40.0 BPH WITHOUT OBSTRUCTION/LOWER URINARY TRACT SYMPTOMS: ICD-10-CM

## 2023-01-24 DIAGNOSIS — R10.9 ACUTE FLANK PAIN: ICD-10-CM

## 2023-01-24 DIAGNOSIS — R10.9 LEFT FLANK PAIN: Primary | ICD-10-CM

## 2023-01-24 DIAGNOSIS — F52.21 ED (ERECTILE DYSFUNCTION) OF NON-ORGANIC ORIGIN: ICD-10-CM

## 2023-01-24 DIAGNOSIS — N40.1 BENIGN PROSTATIC HYPERPLASIA WITH INCOMPLETE BLADDER EMPTYING: ICD-10-CM

## 2023-01-24 DIAGNOSIS — N20.0 RIGHT NEPHROLITHIASIS: ICD-10-CM

## 2023-01-24 LAB
BILIRUB BLD-MCNC: NEGATIVE MG/DL
CLARITY, POC: CLEAR
COLOR UR: YELLOW
EXPIRATION DATE: ABNORMAL
GLUCOSE UR STRIP-MCNC: ABNORMAL MG/DL
KETONES UR QL: NEGATIVE
LEUKOCYTE EST, POC: NEGATIVE
Lab: ABNORMAL
NITRITE UR-MCNC: NEGATIVE MG/ML
PH UR: 5 [PH] (ref 5–8)
PROT UR STRIP-MCNC: NEGATIVE MG/DL
RBC # UR STRIP: NEGATIVE /UL
SP GR UR: 1.02 (ref 1–1.03)
UROBILINOGEN UR QL: NORMAL

## 2023-01-24 PROCEDURE — 84154 ASSAY OF PSA FREE: CPT | Performed by: NURSE PRACTITIONER

## 2023-01-24 PROCEDURE — 99214 OFFICE O/P EST MOD 30 MIN: CPT | Performed by: NURSE PRACTITIONER

## 2023-01-24 PROCEDURE — 96372 THER/PROPH/DIAG INJ SC/IM: CPT | Performed by: NURSE PRACTITIONER

## 2023-01-24 PROCEDURE — 74018 RADEX ABDOMEN 1 VIEW: CPT | Performed by: RADIOLOGY

## 2023-01-24 PROCEDURE — 84153 ASSAY OF PSA TOTAL: CPT | Performed by: NURSE PRACTITIONER

## 2023-01-24 PROCEDURE — 74018 RADEX ABDOMEN 1 VIEW: CPT

## 2023-01-24 RX ORDER — CYCLOBENZAPRINE HCL 10 MG
10 TABLET ORAL EVERY 8 HOURS PRN
Qty: 30 TABLET | Refills: 1 | Status: SHIPPED | OUTPATIENT
Start: 2023-01-24

## 2023-01-24 RX ORDER — TAMSULOSIN HYDROCHLORIDE 0.4 MG/1
1 CAPSULE ORAL DAILY
Qty: 30 CAPSULE | Refills: 5 | Status: SHIPPED | OUTPATIENT
Start: 2023-01-24

## 2023-01-24 RX ORDER — KETOROLAC TROMETHAMINE 30 MG/ML
60 INJECTION, SOLUTION INTRAMUSCULAR; INTRAVENOUS ONCE
Status: COMPLETED | OUTPATIENT
Start: 2023-01-24 | End: 2023-01-24

## 2023-01-24 RX ORDER — KETOROLAC TROMETHAMINE 10 MG/1
10 TABLET, FILM COATED ORAL EVERY 6 HOURS PRN
Qty: 20 TABLET | Refills: 0 | Status: SHIPPED | OUTPATIENT
Start: 2023-01-24

## 2023-01-24 RX ADMIN — KETOROLAC TROMETHAMINE 60 MG: 30 INJECTION, SOLUTION INTRAMUSCULAR; INTRAVENOUS at 13:30
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
## 2023-01-26 LAB
PSA FREE MFR SERPL: 21.5 %
PSA FREE SERPL-MCNC: 0.28 NG/ML
PSA SERPL-MCNC: 1.3 NG/ML (ref 0–4)

## 2023-01-27 ENCOUNTER — TELEPHONE (OUTPATIENT)
Dept: UROLOGY | Facility: CLINIC | Age: 53
End: 2023-01-27
Payer: COMMERCIAL

## 2023-01-27 NOTE — TELEPHONE ENCOUNTER
----- Message from Griselda Cheng-Akwa, APRN sent at 1/27/2023 11:07 AM EST -----  PLEASE LET PATIENT KNOW PSA RESULTS. FOLLOW UP AS DISCUSSED. RITA

## 2023-02-02 ENCOUNTER — HOSPITAL ENCOUNTER (OUTPATIENT)
Dept: CT IMAGING | Facility: HOSPITAL | Age: 53
Discharge: HOME | End: 2023-02-02
Admitting: NURSE PRACTITIONER
Payer: COMMERCIAL

## 2023-02-02 PROCEDURE — 74176 CT ABD & PELVIS W/O CONTRAST: CPT | Performed by: RADIOLOGY

## 2023-02-02 PROCEDURE — 74176 CT ABD & PELVIS W/O CONTRAST: CPT

## 2023-02-10 ENCOUNTER — OFFICE VISIT (OUTPATIENT)
Dept: UROLOGY | Facility: CLINIC | Age: 53
End: 2023-02-10
Payer: COMMERCIAL

## 2023-02-10 VITALS — HEIGHT: 68 IN | BODY MASS INDEX: 36.53 KG/M2 | WEIGHT: 241 LBS

## 2023-02-10 DIAGNOSIS — N52.9 ERECTILE DYSFUNCTION, UNSPECIFIED ERECTILE DYSFUNCTION TYPE: ICD-10-CM

## 2023-02-10 DIAGNOSIS — N20.0 RENAL CALCULUS, RIGHT: Primary | ICD-10-CM

## 2023-02-10 DIAGNOSIS — R10.9 LEFT FLANK PAIN: ICD-10-CM

## 2023-02-10 LAB
BILIRUB BLD-MCNC: NEGATIVE MG/DL
CLARITY, POC: CLEAR
COLOR UR: YELLOW
EXPIRATION DATE: NORMAL
GLUCOSE UR STRIP-MCNC: NEGATIVE MG/DL
KETONES UR QL: NEGATIVE
LEUKOCYTE EST, POC: NEGATIVE
Lab: NORMAL
NITRITE UR-MCNC: NEGATIVE MG/ML
PH UR: 6 [PH] (ref 5–8)
PROT UR STRIP-MCNC: NEGATIVE MG/DL
RBC # UR STRIP: NEGATIVE /UL
SP GR UR: 1.02 (ref 1–1.03)
UROBILINOGEN UR QL: NORMAL

## 2023-02-10 PROCEDURE — 51798 US URINE CAPACITY MEASURE: CPT | Performed by: NURSE PRACTITIONER

## 2023-02-10 PROCEDURE — 99214 OFFICE O/P EST MOD 30 MIN: CPT | Performed by: NURSE PRACTITIONER
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
## 2023-02-20 ENCOUNTER — OFFICE VISIT (OUTPATIENT)
Dept: CARDIOLOGY | Facility: CLINIC | Age: 53
End: 2023-02-20
Payer: COMMERCIAL

## 2023-02-20 VITALS
HEIGHT: 68 IN | SYSTOLIC BLOOD PRESSURE: 128 MMHG | OXYGEN SATURATION: 96 % | WEIGHT: 250.2 LBS | BODY MASS INDEX: 37.92 KG/M2 | DIASTOLIC BLOOD PRESSURE: 91 MMHG | HEART RATE: 89 BPM

## 2023-02-20 DIAGNOSIS — I25.10 ASCVD (ARTERIOSCLEROTIC CARDIOVASCULAR DISEASE): Primary | ICD-10-CM

## 2023-02-20 PROCEDURE — 93000 ELECTROCARDIOGRAM COMPLETE: CPT | Performed by: PHYSICIAN ASSISTANT

## 2023-02-20 PROCEDURE — 99213 OFFICE O/P EST LOW 20 MIN: CPT | Performed by: PHYSICIAN ASSISTANT

## 2023-02-20 NOTE — PROGRESS NOTES
Iliana Storey, APRN  Austin Sneed  1970 02/20/2023    Patient Active Problem List   Diagnosis   • Precordial pain   • Dyspnea on exertion   • ASCVD (arteriosclerotic cardiovascular disease), status post stenting about 3 years ago in [facility].    • Chest pain   • Erectile dysfunction   • Closed nondisplaced fracture of lateral malleolus of right fibula   • Morbidly obese (HCC)   • Type 2 diabetes mellitus without complication, without long-term current use of insulin (HCC)   • Displaced fracture of lateral malleolus of right fibula, initial encounter for closed fracture   • Renal calculus, right   • Umbilical hernia without obstruction and without gangrene   • Gross hematuria   • Ureteral calculus   • Acute pain of left shoulder   • Osteoarthritis of left AC (acromioclavicular) joint   • Rotator cuff tendinitis, left   • Traumatic complete tear of left rotator cuff   • Biceps tendinitis of left upper extremity   • Impingement syndrome of left shoulder   • Bursitis of left shoulder       Dear Iliana Storey, APRN:    Subjective     History of Present Illness:    Chief Complaint   Patient presents with   • Follow-up     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 male with a past medical history significant for ASCVD status post stenting in [facility] in 2015 diabetes mellitus, dyslipidemia, essential hypertension.  Comes in today for cardiology follow-up    Patient denies any chest pain, shortness of breath, palpitations, dizziness, syncope or near syncope.     Allergies   Allergen Reactions   • Penicillins Hives   :      Current Outpatient Medications:   •  atorvastatin (LIPITOR) 20 MG tablet, Take 20 mg by mouth Daily., Disp: , Rfl:   •  Blood Glucose Monitoring Suppl (TRUE METRIX METER) w/Device kit, Use as directed to test Blood Sugar. (Patient taking differently: States does not check sugar at home), Disp: 1 kit, Rfl: 0  •  clopidogrel (PLAVIX) 75 MG tablet, ,  Disp: , Rfl:   •  glucose blood test strip, Use as directed to test Blood Sugar 2 times a day. (Patient taking differently: Use as directed to test Blood Sugar 2 times a day.  States does not check sugar at home), Disp: 50 each, Rfl: 0  •  lisinopril (PRINIVIL,ZESTRIL) 2.5 MG tablet, , Disp: , Rfl:   •  metFORMIN ER (GLUCOPHAGE-XR) 500 MG 24 hr tablet, Take 1,000 mg by mouth 2 (two) times a day., Disp: , Rfl:   •  metoprolol succinate XL (TOPROL-XL) 50 MG 24 hr tablet, Take 1 tablet by mouth Daily., Disp: , Rfl:   •  Ozempic, 0.25 or 0.5 MG/DOSE, 2 MG/1.5ML solution pen-injector, Inject  under the skin into the appropriate area as directed 1 (One) Time Per Week., Disp: , Rfl:   •  sildenafil (VIAGRA) 100 MG tablet, TAKE ONE TABLET BY MOUTH DAILY AS NEEDED APPROXIMATELY 1 HOUR BEFORE SEXUAL ACTIVITY, Disp: , Rfl:   •  TRUEPLUS LANCETS 28G misc, Use as Directed to test Blood Sugar 2 times a day (Patient taking differently: States does not check sugar at home), Disp: 50 each, Rfl: 0  •  aspirin (aspirin) 81 MG EC tablet, Take 1 tablet by mouth Daily., Disp: 90 tablet, Rfl: 3  •  cyclobenzaprine (FLEXERIL) 10 MG tablet, Take 1 tablet by mouth Every 8 (Eight) Hours As Needed for Muscle Spasms., Disp: 30 tablet, Rfl: 1  •  hydrOXYzine (ATARAX) 10 MG tablet, , Disp: , Rfl:   •  ketorolac (TORADOL) 10 MG tablet, Take 1 tablet by mouth Every 6 (Six) Hours As Needed for Moderate Pain., Disp: 20 tablet, Rfl: 0  •  tamsulosin (FLOMAX) 0.4 MG capsule 24 hr capsule, Take 1 capsule by mouth Daily., Disp: 30 capsule, Rfl: 5    The following portions of the patient's history were reviewed and updated as appropriate: allergies, current medications, past family history, past medical history, past social history, past surgical history and problem list.    Social History     Tobacco Use   • Smoking status: Former     Packs/day: 0.50     Years: 10.00     Pack years: 5.00     Types: Cigarettes     Quit date: 2015     Years since quitting:  "8.1   • Smokeless tobacco: Never   Vaping Use   • Vaping Use: Never used   Substance Use Topics   • Alcohol use: Yes     Comment: social   • Drug use: Never         Objective   Vitals:    02/20/23 1252   BP: 128/91   Pulse: 89   SpO2: 96%   Weight: 113 kg (250 lb 3.2 oz)   Height: 172.7 cm (68\")     Body mass index is 38.04 kg/m².    Constitutional:       General: Not in acute distress.     Appearance: Healthy appearance. Well-developed and not in distress. Not diaphoretic.   Eyes:      Conjunctiva/sclera: Conjunctivae normal.      Pupils: Pupils are equal, round, and reactive to light.   HENT:      Head: Normocephalic and atraumatic.   Neck:      Vascular: No carotid bruit or JVD.   Pulmonary:      Effort: Pulmonary effort is normal. No respiratory distress.      Breath sounds: Normal breath sounds.   Cardiovascular:      Normal rate. Regular rhythm.   Skin:     General: Skin is cool.   Neurological:      Mental Status: Alert, oriented to person, place, and time and oriented to person, place and time.         Lab Results   Component Value Date     01/16/2022    K 4.0 01/16/2022     01/16/2022    CO2 19.1 (L) 01/16/2022    BUN 21 (H) 01/16/2022    CREATININE 1.32 (H) 01/16/2022    GLUCOSE 126 (H) 01/16/2022    CALCIUM 9.6 01/16/2022    AST 27 01/16/2022    ALT 35 01/16/2022    ALKPHOS 56 01/16/2022     Lab Results   Component Value Date    CKTOTAL 108 08/10/2020     Lab Results   Component Value Date    WBC 6.42 01/16/2022    HGB 16.0 01/16/2022    HCT 48.5 01/16/2022     01/16/2022     Lab Results   Component Value Date    INR 0.86 (L) 01/16/2022     Lab Results   Component Value Date    MG 2.0 08/10/2020     Lab Results   Component Value Date    TSH 4.170 08/10/2020    PSA 1.3 01/24/2023    TRIG 97 07/20/2019    HDL 35 (L) 07/20/2019    LDL 43 07/20/2019      Lab Results   Component Value Date    BNP <2.0 07/25/2018       During this visit the following were done:  Labs Reviewed []    Labs " Ordered []    Radiology Reports Reviewed []    Radiology Ordered []    PCP Records Reviewed []    Referring Provider Records Reviewed []    ER Records Reviewed []    Hospital Records Reviewed []    History Obtained From Family []    Radiology Images Reviewed []    Other Reviewed []    Records Requested []         ECG 12 Lead    Date/Time: 2/20/2023 12:55 PM  Performed by: Lloyd Escobar PA-C  Authorized by: Lloyd Escobar PA-C   Comparison: compared with previous ECG   Similar to previous ECG  Rhythm: sinus rhythm  Conduction: conduction normal  ST Segments: ST segments normal    Clinical impression: normal ECG            Assessment & Plan    Diagnosis Plan   1. ASCVD (arteriosclerotic cardiovascular disease), status post stenting  [facility] in 2015.   Stress Test With Myocardial Perfusion One Day               Recommendations:  1. ASCVD with history of stenting in [facility] in roughly 2015  a. We will go ahead and order Lexiscan sestamibi study due to patient's DOT physical requirements.  Given his history of right ankle fracture in the past patient unable to run on treadmill will order Lexiscan sestamibi study.    No follow-ups on file.    As always, I appreciate very much the opportunity to participate in the cardiovascular care of your patients.      With Best Regards,    Lloyd Escobar PA-C

## 2023-03-31 ENCOUNTER — TELEPHONE (OUTPATIENT)
Dept: CARDIOLOGY | Facility: CLINIC | Age: 53
End: 2023-03-31
Payer: COMMERCIAL

## 2023-03-31 ENCOUNTER — HOSPITAL ENCOUNTER (OUTPATIENT)
Dept: NUCLEAR MEDICINE | Facility: HOSPITAL | Age: 53
Discharge: HOME | End: 2023-03-31
Payer: COMMERCIAL

## 2023-03-31 DIAGNOSIS — I25.10 ASCVD (ARTERIOSCLEROTIC CARDIOVASCULAR DISEASE): ICD-10-CM

## 2023-03-31 LAB
MAXIMAL PREDICTED HEART RATE: 166 BPM
STRESS TARGET HR: 141 BPM

## 2023-03-31 PROCEDURE — A9500 TC99M SESTAMIBI: HCPCS | Performed by: PHYSICIAN ASSISTANT

## 2023-03-31 PROCEDURE — 0 TECHNETIUM SESTAMIBI: Performed by: PHYSICIAN ASSISTANT

## 2023-03-31 RX ADMIN — TECHNETIUM TC 99M SESTAMIBI 1 DOSE: 1 INJECTION INTRAVENOUS at 08:20

## 2023-04-04 NOTE — TELEPHONE ENCOUNTER
Why was he unable to get this done?  I do not mind to order an echo if it is required by his DOT however I believe they would require a stress test stable.

## 2023-04-06 DIAGNOSIS — R07.2 PRECORDIAL PAIN: ICD-10-CM

## 2023-04-06 DIAGNOSIS — I25.10 ASCVD (ARTERIOSCLEROTIC CARDIOVASCULAR DISEASE): Primary | ICD-10-CM

## 2023-04-10 ENCOUNTER — HOSPITAL ENCOUNTER (OUTPATIENT)
Dept: GENERAL RADIOLOGY | Facility: HOSPITAL | Age: 53
Discharge: HOME | End: 2023-04-10
Admitting: PHYSICIAN ASSISTANT
Payer: COMMERCIAL

## 2023-04-10 ENCOUNTER — TRANSCRIBE ORDERS (OUTPATIENT)
Dept: LAB | Facility: HOSPITAL | Age: 53
End: 2023-04-10
Payer: COMMERCIAL

## 2023-04-10 DIAGNOSIS — M54.50 LOW BACK PAIN, UNSPECIFIED BACK PAIN LATERALITY, UNSPECIFIED CHRONICITY, UNSPECIFIED WHETHER SCIATICA PRESENT: ICD-10-CM

## 2023-04-10 DIAGNOSIS — M54.50 LOW BACK PAIN, UNSPECIFIED BACK PAIN LATERALITY, UNSPECIFIED CHRONICITY, UNSPECIFIED WHETHER SCIATICA PRESENT: Primary | ICD-10-CM

## 2023-04-10 PROCEDURE — 72100 X-RAY EXAM L-S SPINE 2/3 VWS: CPT

## 2023-04-10 PROCEDURE — 72100 X-RAY EXAM L-S SPINE 2/3 VWS: CPT | Performed by: RADIOLOGY

## 2023-04-13 ENCOUNTER — OFFICE VISIT (OUTPATIENT)
Dept: UROLOGY | Facility: CLINIC | Age: 53
End: 2023-04-13
Payer: COMMERCIAL

## 2023-04-13 ENCOUNTER — HOSPITAL ENCOUNTER (OUTPATIENT)
Dept: GENERAL RADIOLOGY | Facility: HOSPITAL | Age: 53
Discharge: HOME | End: 2023-04-13
Admitting: NURSE PRACTITIONER
Payer: COMMERCIAL

## 2023-04-13 VITALS
HEIGHT: 68 IN | BODY MASS INDEX: 37.89 KG/M2 | WEIGHT: 250 LBS | HEART RATE: 97 BPM | DIASTOLIC BLOOD PRESSURE: 91 MMHG | SYSTOLIC BLOOD PRESSURE: 138 MMHG

## 2023-04-13 DIAGNOSIS — N20.0 RIGHT NEPHROLITHIASIS: Primary | ICD-10-CM

## 2023-04-13 DIAGNOSIS — N40.0 BPH WITHOUT OBSTRUCTION/LOWER URINARY TRACT SYMPTOMS: ICD-10-CM

## 2023-04-13 DIAGNOSIS — R10.9 LEFT FLANK PAIN: ICD-10-CM

## 2023-04-13 DIAGNOSIS — N20.0 BILATERAL NEPHROLITHIASIS: ICD-10-CM

## 2023-04-13 DIAGNOSIS — R10.9 ACUTE FLANK PAIN: ICD-10-CM

## 2023-04-13 LAB
BILIRUB BLD-MCNC: NEGATIVE MG/DL
CLARITY, POC: CLEAR
COLOR UR: YELLOW
EXPIRATION DATE: ABNORMAL
GLUCOSE UR STRIP-MCNC: NEGATIVE MG/DL
KETONES UR QL: ABNORMAL
LEUKOCYTE EST, POC: NEGATIVE
Lab: ABNORMAL
NITRITE UR-MCNC: NEGATIVE MG/ML
PH UR: 5 [PH] (ref 5–8)
PROT UR STRIP-MCNC: ABNORMAL MG/DL
RBC # UR STRIP: NEGATIVE /UL
SP GR UR: 1.02 (ref 1–1.03)
UROBILINOGEN UR QL: NORMAL

## 2023-04-13 PROCEDURE — 99214 OFFICE O/P EST MOD 30 MIN: CPT | Performed by: NURSE PRACTITIONER

## 2023-04-13 PROCEDURE — 1159F MED LIST DOCD IN RCRD: CPT | Performed by: NURSE PRACTITIONER

## 2023-04-13 PROCEDURE — 87086 URINE CULTURE/COLONY COUNT: CPT | Performed by: NURSE PRACTITIONER

## 2023-04-13 PROCEDURE — 74018 RADEX ABDOMEN 1 VIEW: CPT

## 2023-04-13 PROCEDURE — 1160F RVW MEDS BY RX/DR IN RCRD: CPT | Performed by: NURSE PRACTITIONER

## 2023-04-13 PROCEDURE — 74018 RADEX ABDOMEN 1 VIEW: CPT | Performed by: RADIOLOGY

## 2023-04-13 RX ORDER — IBUPROFEN 800 MG/1
TABLET ORAL
COMMUNITY
Start: 2023-04-02

## 2023-04-13 RX ORDER — GENTAMICIN SULFATE 80 MG/100ML
80 INJECTION, SOLUTION INTRAVENOUS ONCE
OUTPATIENT
Start: 2023-04-13 | End: 2023-04-13
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
## 2023-04-14 LAB — BACTERIA SPEC AEROBE CULT: NO GROWTH

## 2023-04-17 ENCOUNTER — HOSPITAL ENCOUNTER (OUTPATIENT)
Dept: CARDIOLOGY | Facility: HOSPITAL | Age: 53
Discharge: HOME | End: 2023-04-17
Admitting: PHYSICIAN ASSISTANT
Payer: COMMERCIAL

## 2023-04-17 DIAGNOSIS — I25.10 ASCVD (ARTERIOSCLEROTIC CARDIOVASCULAR DISEASE): ICD-10-CM

## 2023-04-17 DIAGNOSIS — R07.2 PRECORDIAL PAIN: ICD-10-CM

## 2023-04-17 PROCEDURE — 93017 CV STRESS TEST TRACING ONLY: CPT

## 2023-04-17 PROCEDURE — 93018 CV STRESS TEST I&R ONLY: CPT | Performed by: INTERNAL MEDICINE

## 2023-04-18 LAB
BH CV STRESS BP STAGE 1: NORMAL
BH CV STRESS BP STAGE 2: NORMAL
BH CV STRESS DURATION MIN STAGE 1: 3
BH CV STRESS DURATION MIN STAGE 2: 1
BH CV STRESS DURATION SEC STAGE 1: 0
BH CV STRESS DURATION SEC STAGE 2: 53
BH CV STRESS GRADE STAGE 1: 10
BH CV STRESS GRADE STAGE 2: 12
BH CV STRESS HR STAGE 1: 136
BH CV STRESS HR STAGE 2: 164
BH CV STRESS METS STAGE 1: 5
BH CV STRESS METS STAGE 2: 7.5
BH CV STRESS PROTOCOL 1: NORMAL
BH CV STRESS RECOVERY BP: NORMAL MMHG
BH CV STRESS RECOVERY HR: 130 BPM
BH CV STRESS SPEED STAGE 1: 1.7
BH CV STRESS SPEED STAGE 2: 2.5
BH CV STRESS STAGE 1: 1
BH CV STRESS STAGE 2: 2
MAXIMAL PREDICTED HEART RATE: 166 BPM
PERCENT MAX PREDICTED HR: 98.8 %
STRESS BASELINE BP: NORMAL MMHG
STRESS BASELINE HR: 108 BPM
STRESS PERCENT HR: 116 %
STRESS POST EXERCISE DUR MIN: 4 MIN
STRESS POST EXERCISE DUR SEC: 58 SEC
STRESS POST PEAK BP: NORMAL MMHG
STRESS POST PEAK HR: 164 BPM
STRESS TARGET HR: 141 BPM

## 2023-04-20 ENCOUNTER — TELEPHONE (OUTPATIENT)
Dept: UROLOGY | Facility: CLINIC | Age: 53
End: 2023-04-20
Payer: COMMERCIAL

## 2023-04-20 DIAGNOSIS — N20.0 RIGHT NEPHROLITHIASIS: Primary | ICD-10-CM

## 2023-04-20 NOTE — TELEPHONE ENCOUNTER
Caller: RAKESH URBINA     Relationship: SELF     Best call back number: [phone]    PT MISSED A CALL. NO TELEPHONE ENCOUNTERS IN CHART.

## 2023-04-20 NOTE — TELEPHONE ENCOUNTER
I had tried to call the pt earlier to schedule his surgery and the message is in the case request. I tried to call him back again and still did not reach him.

## 2023-04-26 ENCOUNTER — TELEPHONE (OUTPATIENT)
Dept: UROLOGY | Facility: CLINIC | Age: 53
End: 2023-04-26
Payer: COMMERCIAL

## 2023-04-26 NOTE — TELEPHONE ENCOUNTER
PT CALLED AND WANTED TO CANCEL HIS RIGHT ESWL ON 5/12/23 AND HE WOULD CALL BACK AND RESCHEDULE LATER

## 2023-05-16 ENCOUNTER — TELEPHONE (OUTPATIENT)
Dept: CARDIOLOGY | Facility: CLINIC | Age: 53
End: 2023-05-16
Payer: COMMERCIAL

## 2023-05-16 NOTE — TELEPHONE ENCOUNTER
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

## 2023-05-16 NOTE — LETTER
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 If you have any questions or concerns you may contact our office at [phone]            Sincerely,          Llody Escobar PA-C.

## 2023-08-11 ENCOUNTER — OFFICE VISIT (OUTPATIENT)
Dept: UROLOGY | Facility: CLINIC | Age: 53
End: 2023-08-11
Payer: COMMERCIAL

## 2023-08-11 VITALS
SYSTOLIC BLOOD PRESSURE: 123 MMHG | BODY MASS INDEX: 37.89 KG/M2 | HEIGHT: 68 IN | HEART RATE: 93 BPM | WEIGHT: 250 LBS | DIASTOLIC BLOOD PRESSURE: 81 MMHG

## 2023-08-11 DIAGNOSIS — R79.89 LOW TESTOSTERONE: ICD-10-CM

## 2023-08-11 DIAGNOSIS — R10.9 ACUTE FLANK PAIN: ICD-10-CM

## 2023-08-11 DIAGNOSIS — N52.9 ERECTILE DYSFUNCTION, UNSPECIFIED ERECTILE DYSFUNCTION TYPE: ICD-10-CM

## 2023-08-11 DIAGNOSIS — N36.8: ICD-10-CM

## 2023-08-11 DIAGNOSIS — E29.1 HYPOGONADISM IN MALE: Primary | ICD-10-CM

## 2023-08-11 RX ORDER — GENTAMICIN SULFATE 40 MG/ML
80 INJECTION, SOLUTION INTRAMUSCULAR; INTRAVENOUS ONCE
Status: COMPLETED | OUTPATIENT
Start: 2023-08-11 | End: 2023-08-11

## 2023-08-11 RX ADMIN — GENTAMICIN SULFATE 80 MG: 40 INJECTION, SOLUTION INTRAMUSCULAR; INTRAVENOUS at 10:23
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
## 2023-08-14 ENCOUNTER — TELEPHONE (OUTPATIENT)
Dept: CARDIOLOGY | Facility: CLINIC | Age: 53
End: 2023-08-14
Payer: COMMERCIAL

## 2023-08-17 NOTE — TELEPHONE ENCOUNTER
Pt's wife called this morning and I advised her. She stated that she didn't think Austin took the Imdur I advised her that it was on he med list when he came into the office. She is going to ask him and advised him to stop if he does take it.    [FreeTextEntry1] : Mr. Nogueira is a 58 year old gentleman with a prior history of a cutaneous melanoma of the right cheek, now with an M1d, stage IV, melanoma affecting the brain, lungs and lymph nodes. His tumor is wild type for BRAF.  His course has been complicated by the development of symptomatic pulmonary emboli.  He is receiving lovenox in the setting of known hemorraghic brain metastases given the clot burden, with the brain findings stable on serial imaging studies.  Repeat CT head done yesterday reflects stability and is wo evidence of new acute bleed. The patient and wife have been counseled regarding the risk and benefits of anticoagulation in this setting, as well as the potential utility of IVC filter placement and are on board with the current plan despite the risks. We will consult with Dr. Myron Plascencia (hematology) before determining long term plan for anticoagulation  At this time, he completed his [facility] radiotherapy course and will taper his steroids further. Starting tomorrow will decrease Decadron to 2mg BID.  He will continue bactrim prophylaxis for now.  We will proceed with his second dose of ipilmumab and nivolumab as scheduled today despite steroid, along with weekly toxicity checks for the first 2 doses followed by restaging studies. Will repeat PET imaging. MRI brain per RT recommendation (likely in 1 month) We will arrange for physical therapy.  Finally, we will obtain a larger next generation sequencing panel on his tumor.

## 2023-08-21 ENCOUNTER — OFFICE VISIT (OUTPATIENT)
Dept: CARDIOLOGY | Facility: CLINIC | Age: 53
End: 2023-08-21
Payer: COMMERCIAL

## 2023-08-21 VITALS
RESPIRATION RATE: 18 BRPM | BODY MASS INDEX: 39.39 KG/M2 | HEIGHT: 67 IN | HEART RATE: 105 BPM | WEIGHT: 251 LBS | DIASTOLIC BLOOD PRESSURE: 81 MMHG | OXYGEN SATURATION: 96 % | SYSTOLIC BLOOD PRESSURE: 130 MMHG

## 2023-08-21 DIAGNOSIS — I10 ESSENTIAL HYPERTENSION: ICD-10-CM

## 2023-08-21 DIAGNOSIS — I25.10 ASCVD (ARTERIOSCLEROTIC CARDIOVASCULAR DISEASE): Primary | ICD-10-CM

## 2023-08-21 PROCEDURE — 1159F MED LIST DOCD IN RCRD: CPT | Performed by: PHYSICIAN ASSISTANT

## 2023-08-21 PROCEDURE — 3075F SYST BP GE 130 - 139MM HG: CPT | Performed by: PHYSICIAN ASSISTANT

## 2023-08-21 PROCEDURE — 3079F DIAST BP 80-89 MM HG: CPT | Performed by: PHYSICIAN ASSISTANT

## 2023-08-21 PROCEDURE — 1160F RVW MEDS BY RX/DR IN RCRD: CPT | Performed by: PHYSICIAN ASSISTANT

## 2023-08-21 PROCEDURE — 93000 ELECTROCARDIOGRAM COMPLETE: CPT | Performed by: PHYSICIAN ASSISTANT

## 2023-08-21 PROCEDURE — 99213 OFFICE O/P EST LOW 20 MIN: CPT | Performed by: PHYSICIAN ASSISTANT

## 2023-08-21 NOTE — PROGRESS NOTES
Iliana Stroey, APRN  Austin Sneed  1970 08/21/2023    Patient Active Problem List   Diagnosis    Precordial pain    Dyspnea on exertion    ASCVD (arteriosclerotic cardiovascular disease), status post stenting about 3 years ago in [facility].     Chest pain    Erectile dysfunction    Closed nondisplaced fracture of lateral malleolus of right fibula    Morbidly obese    Type 2 diabetes mellitus without complication, without long-term current use of insulin    Displaced fracture of lateral malleolus of right fibula, initial encounter for closed fracture    Renal calculus, right    Umbilical hernia without obstruction and without gangrene    Gross hematuria    Ureteral calculus    Acute pain of left shoulder    Osteoarthritis of left AC (acromioclavicular) joint    Rotator cuff tendinitis, left    Traumatic complete tear of left rotator cuff    Biceps tendinitis of left upper extremity    Impingement syndrome of left shoulder    Bursitis of left shoulder    Acute flank pain    Essential hypertension       Dear Iliana Storey, APRN:    Subjective     History of Present Illness:    Chief Complaint   Patient presents with    Results     Stress test       Austin Sneed is a pleasant 52 y.o. male with a past medical history significant for ASCVD status post stenting in [facility] in 2015 diabetes mellitus, dyslipidemia, essential hypertension. Comes in today for cardiology follow-up     Austin has no complaints with open questions. Patient denies any chest pain, shortness of breath, palpitations, dizziness, syncope or near syncope.  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  EKG did show some sinus tachycardia with a rate of 108.  He is completely asymptomatic denies any symptoms concerning for illness or infection.  Denies any worsening shortness of breath from baseline.    Allergies   Allergen Reactions    Penicillins Hives    :      Current Outpatient Medications:     atorvastatin (LIPITOR) 20 MG tablet, Take 1 tablet by mouth Daily., Disp: , Rfl:     Blood Glucose Monitoring Suppl (TRUE METRIX METER) w/Device kit, Use as directed to test Blood Sugar., Disp: 1 kit, Rfl: 0    clopidogrel (PLAVIX) 75 MG tablet, , Disp: , Rfl:     cyclobenzaprine (FLEXERIL) 10 MG tablet, Take 1 tablet by mouth Every 8 (Eight) Hours As Needed for Muscle Spasms., Disp: 30 tablet, Rfl: 1    glucose blood test strip, Use as directed to test Blood Sugar 2 times a day., Disp: 50 each, Rfl: 0    ibuprofen (ADVIL,MOTRIN) 800 MG tablet, , Disp: , Rfl:     lisinopril (PRINIVIL,ZESTRIL) 2.5 MG tablet, , Disp: , Rfl:     metFORMIN ER (GLUCOPHAGE-XR) 500 MG 24 hr tablet, Take 2 tablets by mouth 2 (two) times a day., Disp: , Rfl:     metoprolol succinate XL (TOPROL-XL) 50 MG 24 hr tablet, Take 1 tablet by mouth Daily., Disp: , Rfl:     sildenafil (VIAGRA) 100 MG tablet, TAKE ONE TABLET BY MOUTH DAILY AS NEEDED APPROXIMATELY 1 HOUR BEFORE SEXUAL ACTIVITY, Disp: , Rfl:     tamsulosin (FLOMAX) 0.4 MG capsule 24 hr capsule, Take 1 capsule by mouth Daily., Disp: 30 capsule, Rfl: 5    TRUEPLUS LANCETS 28G misc, Use as Directed to test Blood Sugar 2 times a day, Disp: 50 each, Rfl: 0    hydrOXYzine (ATARAX) 10 MG tablet, , Disp: , Rfl:     ketorolac (TORADOL) 10 MG tablet, Take 1 tablet by mouth Every 6 (Six) Hours As Needed for Moderate Pain. (Patient not taking: Reported on 8/21/2023), Disp: 20 tablet, Rfl: 0    Ozempic, 0.25 or 0.5 MG/DOSE, 2 MG/1.5ML solution pen-injector, Inject  under the skin into the appropriate area as directed 1 (One) Time Per Week. (Patient not taking: Reported on 8/21/2023), Disp: , Rfl:     The following portions of the patient's history were reviewed and updated as appropriate: allergies, current medications, past family history, past medical history, past social history, past surgical history and problem list.    Social History     Tobacco Use  "   Smoking status: Former     Packs/day: 0.50     Years: 10.00     Pack years: 5.00     Types: Cigarettes     Quit date:      Years since quittin.6    Smokeless tobacco: Never   Vaping Use    Vaping Use: Never used   Substance Use Topics    Alcohol use: Yes     Comment: social    Drug use: Never         Objective   Vitals:    23 0913   BP: 130/81   BP Location: Right arm   Patient Position: Sitting   Cuff Size: Adult   Pulse: 105   Resp: 18   SpO2: 96%   Weight: 114 kg (251 lb)   Height: 170.2 cm (67\")     Body mass index is 39.31 kg/mý.    Constitutional:       General: Not in acute distress.     Appearance: Healthy appearance. Well-developed and not in distress. Not diaphoretic.   Eyes:      Conjunctiva/sclera: Conjunctivae normal.      Pupils: Pupils are equal, round, and reactive to light.   HENT:      Head: Normocephalic and atraumatic.   Neck:      Vascular: No carotid bruit or JVD.   Pulmonary:      Effort: Pulmonary effort is normal. No respiratory distress.      Breath sounds: Normal breath sounds.   Cardiovascular:      Normal rate. Regular rhythm.   Edema:     Peripheral edema absent.   Skin:     General: Skin is cool.   Neurological:      Mental Status: Alert, oriented to person, place, and time and oriented to person, place and time.       Lab Results   Component Value Date     2022    K 4.0 2022     2022    CO2 19.1 (L) 2022    BUN 21 (H) 2022    CREATININE 1.32 (H) 2022    GLUCOSE 126 (H) 2022    CALCIUM 9.6 2022    AST 27 2022    ALT 35 2022    ALKPHOS 56 2022     Lab Results   Component Value Date    CKTOTAL 108 08/10/2020     Lab Results   Component Value Date    WBC 6.42 2022    HGB 16.0 2022    HCT 48.5 2022     2022     Lab Results   Component Value Date    INR 0.86 (L) 2022     Lab Results   Component Value Date    MG 2.0 08/10/2020     Lab Results   Component Value " Date    TSH 4.170 08/10/2020    PSA 1.3 01/24/2023    TRIG 97 07/20/2019    HDL 35 (L) 07/20/2019    LDL 43 07/20/2019      Lab Results   Component Value Date    BNP <2.0 07/25/2018       During this visit the following were done:  Labs Reviewed []    Labs Ordered []    Radiology Reports Reviewed []    Radiology Ordered []    PCP Records Reviewed []    Referring Provider Records Reviewed []    ER Records Reviewed []    Hospital Records Reviewed []    History Obtained From Family []    Radiology Images Reviewed []    Other Reviewed []    Records Requested []         ECG 12 Lead    Date/Time: 8/21/2023 12:42 PM  Performed by: Lloyd Escobar PA-C  Authorized by: Lloyd Escobar PA-C   Comparison: compared with previous ECG   Similar to previous ECG  Rhythm: sinus rhythm  Rhythm comments: Rate of 108  Conduction: conduction normal    Clinical impression: normal ECG        Assessment & Plan    Diagnosis Plan   1. ASCVD (arteriosclerotic cardiovascular disease), status post stenting about 3 years ago in [facility].         2. Essential hypertension                 Recommendations:  CAD  No recent anginal symptoms continue Plavix, Lipitor, metoprolol succinate, lisinopril.  Essential hypertension  Controlled    Return in about 6 months (around 2/21/2024).    As always, I appreciate very much the opportunity to participate in the cardiovascular care of your patients.      With Best Regards,    Lloyd Escobar PA-C

## 2023-08-25 ENCOUNTER — APPOINTMENT (OUTPATIENT)
Dept: CT IMAGING | Facility: HOSPITAL | Age: 53
End: 2023-08-25
Payer: COMMERCIAL

## 2023-08-25 PROCEDURE — 74176 CT ABD & PELVIS W/O CONTRAST: CPT

## 2023-08-25 PROCEDURE — 74176 CT ABD & PELVIS W/O CONTRAST: CPT | Performed by: RADIOLOGY

## 2023-08-25 PROCEDURE — 99284 EMERGENCY DEPT VISIT MOD MDM: CPT

## 2023-08-25 PROCEDURE — 63710000001 ONDANSETRON ODT 4 MG TABLET DISPERSIBLE: Performed by: EMERGENCY MEDICINE

## 2023-08-25 RX ORDER — ONDANSETRON 4 MG/1
4 TABLET, ORALLY DISINTEGRATING ORAL ONCE
Status: COMPLETED | OUTPATIENT
Start: 2023-08-25 | End: 2023-08-25

## 2023-08-25 RX ADMIN — ONDANSETRON 4 MG: 4 TABLET, ORALLY DISINTEGRATING ORAL at 22:52

## 2023-08-26 ENCOUNTER — HOSPITAL ENCOUNTER (EMERGENCY)
Facility: HOSPITAL | Age: 53
Discharge: HOME | End: 2023-08-26
Attending: EMERGENCY MEDICINE
Payer: COMMERCIAL

## 2023-08-26 VITALS
HEART RATE: 92 BPM | BODY MASS INDEX: 38.92 KG/M2 | SYSTOLIC BLOOD PRESSURE: 140 MMHG | DIASTOLIC BLOOD PRESSURE: 86 MMHG | OXYGEN SATURATION: 99 % | WEIGHT: 248 LBS | HEIGHT: 67 IN | TEMPERATURE: 98.6 F | RESPIRATION RATE: 18 BRPM

## 2023-08-26 DIAGNOSIS — R10.84 GENERALIZED ABDOMINAL PAIN: Primary | ICD-10-CM

## 2023-08-26 LAB
ALBUMIN SERPL-MCNC: 4.3 G/DL (ref 3.5–5.2)
ALBUMIN/GLOB SERPL: 1.5 G/DL
ALP SERPL-CCNC: 49 U/L (ref 39–117)
ALT SERPL W P-5'-P-CCNC: 59 U/L (ref 1–41)
ANION GAP SERPL CALCULATED.3IONS-SCNC: 14.3 MMOL/L (ref 5–15)
AST SERPL-CCNC: 22 U/L (ref 1–40)
BASOPHILS # BLD AUTO: 0.03 10*3/MM3 (ref 0–0.2)
BASOPHILS NFR BLD AUTO: 0.2 % (ref 0–1.5)
BILIRUB SERPL-MCNC: 1.2 MG/DL (ref 0–1.2)
BILIRUB UR QL STRIP: NEGATIVE
BUN SERPL-MCNC: 12 MG/DL (ref 6–20)
BUN/CREAT SERPL: 11.9 (ref 7–25)
CALCIUM SPEC-SCNC: 9.4 MG/DL (ref 8.6–10.5)
CHLORIDE SERPL-SCNC: 102 MMOL/L (ref 98–107)
CK SERPL-CCNC: 90 U/L (ref 20–200)
CLARITY UR: CLEAR
CO2 SERPL-SCNC: 21.7 MMOL/L (ref 22–29)
COLOR UR: YELLOW
CREAT SERPL-MCNC: 1.01 MG/DL (ref 0.76–1.27)
CRP SERPL-MCNC: 2.48 MG/DL (ref 0–0.5)
DEPRECATED RDW RBC AUTO: 40.9 FL (ref 37–54)
EGFRCR SERPLBLD CKD-EPI 2021: 88.4 ML/MIN/1.73
EOSINOPHIL # BLD AUTO: 0.01 10*3/MM3 (ref 0–0.4)
EOSINOPHIL NFR BLD AUTO: 0.1 % (ref 0.3–6.2)
ERYTHROCYTE [DISTWIDTH] IN BLOOD BY AUTOMATED COUNT: 12.5 % (ref 12.3–15.4)
ERYTHROCYTE [SEDIMENTATION RATE] IN BLOOD: 16 MM/HR (ref 0–20)
FLUAV RNA RESP QL NAA+PROBE: NOT DETECTED
FLUBV RNA RESP QL NAA+PROBE: NOT DETECTED
GLOBULIN UR ELPH-MCNC: 2.8 GM/DL
GLUCOSE SERPL-MCNC: 193 MG/DL (ref 65–99)
GLUCOSE UR STRIP-MCNC: NEGATIVE MG/DL
HCT VFR BLD AUTO: 47.1 % (ref 37.5–51)
HGB BLD-MCNC: 15.6 G/DL (ref 13–17.7)
HGB UR QL STRIP.AUTO: NEGATIVE
IMM GRANULOCYTES # BLD AUTO: 0.07 10*3/MM3 (ref 0–0.05)
IMM GRANULOCYTES NFR BLD AUTO: 0.5 % (ref 0–0.5)
KETONES UR QL STRIP: NEGATIVE
LEUKOCYTE ESTERASE UR QL STRIP.AUTO: NEGATIVE
LYMPHOCYTES # BLD AUTO: 0.93 10*3/MM3 (ref 0.7–3.1)
LYMPHOCYTES NFR BLD AUTO: 6.1 % (ref 19.6–45.3)
MAGNESIUM SERPL-MCNC: 1.8 MG/DL (ref 1.6–2.6)
MCH RBC QN AUTO: 29.3 PG (ref 26.6–33)
MCHC RBC AUTO-ENTMCNC: 33.1 G/DL (ref 31.5–35.7)
MCV RBC AUTO: 88.4 FL (ref 79–97)
MONOCYTES # BLD AUTO: 1.31 10*3/MM3 (ref 0.1–0.9)
MONOCYTES NFR BLD AUTO: 8.6 % (ref 5–12)
NEUTROPHILS NFR BLD AUTO: 12.9 10*3/MM3 (ref 1.7–7)
NEUTROPHILS NFR BLD AUTO: 84.5 % (ref 42.7–76)
NITRITE UR QL STRIP: NEGATIVE
NRBC BLD AUTO-RTO: 0 /100 WBC (ref 0–0.2)
PH UR STRIP.AUTO: 5.5 [PH] (ref 5–8)
PLATELET # BLD AUTO: 212 10*3/MM3 (ref 140–450)
PMV BLD AUTO: 9.1 FL (ref 6–12)
POTASSIUM SERPL-SCNC: 4.4 MMOL/L (ref 3.5–5.2)
PROT SERPL-MCNC: 7.1 G/DL (ref 6–8.5)
PROT UR QL STRIP: ABNORMAL
RBC # BLD AUTO: 5.33 10*6/MM3 (ref 4.14–5.8)
SARS-COV-2 RNA RESP QL NAA+PROBE: NOT DETECTED
SODIUM SERPL-SCNC: 138 MMOL/L (ref 136–145)
SP GR UR STRIP: >1.03 (ref 1–1.03)
UROBILINOGEN UR QL STRIP: ABNORMAL
WBC NRBC COR # BLD: 15.25 10*3/MM3 (ref 3.4–10.8)

## 2023-08-26 PROCEDURE — 87636 SARSCOV2 & INF A&B AMP PRB: CPT | Performed by: EMERGENCY MEDICINE

## 2023-08-26 PROCEDURE — 83735 ASSAY OF MAGNESIUM: CPT | Performed by: EMERGENCY MEDICINE

## 2023-08-26 PROCEDURE — 82550 ASSAY OF CK (CPK): CPT | Performed by: EMERGENCY MEDICINE

## 2023-08-26 PROCEDURE — 80053 COMPREHEN METABOLIC PANEL: CPT | Performed by: EMERGENCY MEDICINE

## 2023-08-26 PROCEDURE — 86140 C-REACTIVE PROTEIN: CPT | Performed by: EMERGENCY MEDICINE

## 2023-08-26 PROCEDURE — 85652 RBC SED RATE AUTOMATED: CPT | Performed by: EMERGENCY MEDICINE

## 2023-08-26 PROCEDURE — 85025 COMPLETE CBC W/AUTO DIFF WBC: CPT | Performed by: EMERGENCY MEDICINE

## 2023-08-26 PROCEDURE — 81003 URINALYSIS AUTO W/O SCOPE: CPT | Performed by: EMERGENCY MEDICINE

## 2023-08-26 RX ADMIN — SODIUM CHLORIDE 1000 ML: 9 INJECTION, SOLUTION INTRAVENOUS at 00:39

## 2023-08-26 NOTE — DISCHARGE INSTRUCTIONS
Your were evaluated for abdominal pain.  Your tests were ok.  Please continue to treat symptoms at home as needed and follow up with your doctor.  Please return to the Emergency Department with any concerning symptoms.  Bobo Galaviz MD

## 2023-08-26 NOTE — ED PROVIDER NOTES
"Subjective   History of Present Illness  53 yo M with PMHx dm, cad, htn presents with a couple days lower abdominal pain.  Pt states that he had multiple bowel movements yesterday.  Pt today with some lower abdominal pain, which he describes as cramping.  Pt with some \"dry heaves.\"  No fevers.  Pt has not taken anything for symptoms, states he's been in the bed most of the day.      Review of Systems   Constitutional:  Negative for fever.   Cardiovascular:  Negative for chest pain.   Gastrointestinal:  Positive for abdominal pain and vomiting.     Past Medical History:   Diagnosis Date    Arthritis     Coronary artery disease     Diabetes mellitus     Elevated cholesterol     Fracture     GERD (gastroesophageal reflux disease)     Hyperlipidemia     Hypertension     Kidney stones     Sleep apnea     no c-pap       Allergies   Allergen Reactions    Penicillins Hives       Past Surgical History:   Procedure Laterality Date    CARDIAC CATHETERIZATION      COLONOSCOPY      CORONARY STENT PLACEMENT  2015    x1 stent [facility]    EXTRACORPOREAL SHOCK WAVE LITHOTRIPSY (ESWL) Right 8/21/2020    Procedure: EXTRACORPOREAL SHOCKWAVE LITHOTRIPSY;  Surgeon: Pablo Crenshaw MD;  Location: [facility] OR;  Service: Urology;  Laterality: Right;    EXTRACORPOREAL SHOCK WAVE LITHOTRIPSY (ESWL) Right 12/4/2020    Procedure: EXTRACORPOREAL SHOCKWAVE LITHOTRIPSY;  Surgeon: Pablo Crenshaw MD;  Location: [facility] OR;  Service: Urology;  Laterality: Right;    EXTRACORPOREAL SHOCK WAVE LITHOTRIPSY (ESWL) Right 2/19/2021    Procedure: EXTRACORPOREAL SHOCKWAVE LITHOTRIPSY;  Surgeon: Pablo Crenshaw MD;  Location: [facility] OR;  Service: Urology;  Laterality: Right;    FRACTURE SURGERY Right     orif-ankle    ORIF FIBULA FRACTURE Right 12/17/2019    Procedure: OPEN REDUCTION INTERNAL FIXATION RIGHT DISTAL FIBULA;  Surgeon: Christian Brooks MD;  Location: [facility] OR;  Service: Orthopedics    SHOULDER SURGERY Left " 2022    Arthroscopic rotator cuff repair - Dr. Blas       Family History   Problem Relation Age of Onset    Heart disease Father     Heart attack Maternal Grandmother        Social History     Socioeconomic History    Marital status:    Tobacco Use    Smoking status: Former     Packs/day: 0.50     Years: 10.00     Pack years: 5.00     Types: Cigarettes     Quit date:      Years since quittin.6    Smokeless tobacco: Never   Vaping Use    Vaping Use: Never used   Substance and Sexual Activity    Alcohol use: Yes     Comment: social    Drug use: Never    Sexual activity: Defer           Objective   Physical Exam  Vitals and nursing note reviewed.   Constitutional:       Appearance: Normal appearance.      Comments: Generally well-appearing   Pulmonary:      Effort: Pulmonary effort is normal. No respiratory distress.   Abdominal:      Tenderness: There is abdominal tenderness (lower abdomen).       Procedures           ED Course                                           Medical Decision Making  53 yo M with PMHx dm, htn presents with 1-day h/o lower abdominal pain with some vomiting.  No fevers.  Examination with some lower abdominal tenderness.  Will evaluate for acute process.    CT nothing acute.  Wbc and crp elevated - non-specific in this context.  Work-up otherwise unremarkable.  No obvious etiology of pt symptoms, but no evidence of bowel obstruction, diverticulitis, appendicitis, UTI or other emergent medical condition clinically.  Pt doing ok, seems stable for home care.  Discussed results and poc for symptom management, follow-up, return precautions.      Problems Addressed:  Generalized abdominal pain: complicated acute illness or injury    Amount and/or Complexity of Data Reviewed  External Data Reviewed: labs, radiology and notes.  Labs: ordered.  Radiology: ordered and independent interpretation performed.    Risk  Prescription drug management.        Final diagnoses:   Generalized  abdominal pain       ED Disposition  ED Disposition       ED Disposition   Discharge    Condition   Stable    Comment   --               Iliana Storey, APRN  [address] [facility] [address]  [phone]          [facility] EMERGENCY DEPARTMENT  [address] [place] [id number] [phone]    As needed         Medication List      No changes were made to your prescriptions during this visit.            Bobo Galaviz MD  08/26/23 0124

## 2023-08-26 NOTE — ED TRIAGE NOTES
MEDICAL SCREENING:    Reason for Visit: Abdominal Pain    Patient initially seen in triage.  The patient was advised further evaluation and diagnostic testing will be needed, some of the treatment and testing will be initiated in the lobby in order to begin the process.  The patient will be returned to the waiting area for the time being and possibly be re-assessed by a subsequent ED provider.  The patient will be brought back to the treatment area in as timely manner as possible.

## 2024-01-22 ENCOUNTER — HOSPITAL ENCOUNTER (EMERGENCY)
Facility: HOSPITAL | Age: 54
Discharge: HOME | End: 2024-01-22
Attending: EMERGENCY MEDICINE | Admitting: EMERGENCY MEDICINE
Payer: COMMERCIAL

## 2024-01-22 ENCOUNTER — APPOINTMENT (OUTPATIENT)
Dept: CT IMAGING | Facility: HOSPITAL | Age: 54
End: 2024-01-22
Payer: COMMERCIAL

## 2024-01-22 VITALS
HEART RATE: 86 BPM | TEMPERATURE: 98.8 F | DIASTOLIC BLOOD PRESSURE: 102 MMHG | WEIGHT: 254 LBS | OXYGEN SATURATION: 96 % | HEIGHT: 67 IN | RESPIRATION RATE: 20 BRPM | SYSTOLIC BLOOD PRESSURE: 158 MMHG | BODY MASS INDEX: 39.87 KG/M2

## 2024-01-22 DIAGNOSIS — N20.0 RIGHT NEPHROLITHIASIS: ICD-10-CM

## 2024-01-22 DIAGNOSIS — N20.1 URETEROLITHIASIS: Primary | ICD-10-CM

## 2024-01-22 DIAGNOSIS — N23 RENAL COLIC ON RIGHT SIDE: ICD-10-CM

## 2024-01-22 DIAGNOSIS — N40.1 BENIGN PROSTATIC HYPERPLASIA WITH INCOMPLETE BLADDER EMPTYING: ICD-10-CM

## 2024-01-22 DIAGNOSIS — R10.9 ACUTE FLANK PAIN: ICD-10-CM

## 2024-01-22 DIAGNOSIS — R10.9 LEFT FLANK PAIN: ICD-10-CM

## 2024-01-22 DIAGNOSIS — R39.14 BENIGN PROSTATIC HYPERPLASIA WITH INCOMPLETE BLADDER EMPTYING: ICD-10-CM

## 2024-01-22 DIAGNOSIS — N40.0 BPH WITHOUT OBSTRUCTION/LOWER URINARY TRACT SYMPTOMS: ICD-10-CM

## 2024-01-22 LAB
ALBUMIN SERPL-MCNC: 3.7 G/DL (ref 3.5–5.2)
ALBUMIN/GLOB SERPL: 1.2 G/DL
ALP SERPL-CCNC: 55 U/L (ref 39–117)
ALT SERPL W P-5'-P-CCNC: 30 U/L (ref 1–41)
ANION GAP SERPL CALCULATED.3IONS-SCNC: 8.6 MMOL/L (ref 5–15)
AST SERPL-CCNC: 16 U/L (ref 1–40)
BACTERIA UR QL AUTO: ABNORMAL /HPF
BASOPHILS # BLD AUTO: 0.06 10*3/MM3 (ref 0–0.2)
BASOPHILS NFR BLD AUTO: 0.5 % (ref 0–1.5)
BILIRUB SERPL-MCNC: 0.8 MG/DL (ref 0–1.2)
BILIRUB UR QL STRIP: NEGATIVE
BUN SERPL-MCNC: 19 MG/DL (ref 6–20)
BUN/CREAT SERPL: 12.4 (ref 7–25)
CALCIUM SPEC-SCNC: 8.6 MG/DL (ref 8.6–10.5)
CHLORIDE SERPL-SCNC: 106 MMOL/L (ref 98–107)
CLARITY UR: CLEAR
CO2 SERPL-SCNC: 21.4 MMOL/L (ref 22–29)
COLOR UR: YELLOW
CREAT SERPL-MCNC: 1.53 MG/DL (ref 0.76–1.27)
CRP SERPL-MCNC: 5.29 MG/DL (ref 0–0.5)
DEPRECATED RDW RBC AUTO: 42.8 FL (ref 37–54)
EGFRCR SERPLBLD CKD-EPI 2021: 53.4 ML/MIN/1.73
EOSINOPHIL # BLD AUTO: 0.09 10*3/MM3 (ref 0–0.4)
EOSINOPHIL NFR BLD AUTO: 0.7 % (ref 0.3–6.2)
ERYTHROCYTE [DISTWIDTH] IN BLOOD BY AUTOMATED COUNT: 13.2 % (ref 12.3–15.4)
GLOBULIN UR ELPH-MCNC: 3.1 GM/DL
GLUCOSE SERPL-MCNC: 227 MG/DL (ref 65–99)
GLUCOSE UR STRIP-MCNC: ABNORMAL MG/DL
HCT VFR BLD AUTO: 45 % (ref 37.5–51)
HGB BLD-MCNC: 14.7 G/DL (ref 13–17.7)
HGB UR QL STRIP.AUTO: ABNORMAL
HOLD SPECIMEN: NORMAL
HOLD SPECIMEN: NORMAL
HYALINE CASTS UR QL AUTO: ABNORMAL /LPF
IMM GRANULOCYTES # BLD AUTO: 0.07 10*3/MM3 (ref 0–0.05)
IMM GRANULOCYTES NFR BLD AUTO: 0.5 % (ref 0–0.5)
KETONES UR QL STRIP: NEGATIVE
LEUKOCYTE ESTERASE UR QL STRIP.AUTO: NEGATIVE
LIPASE SERPL-CCNC: 139 U/L (ref 13–60)
LYMPHOCYTES # BLD AUTO: 1.14 10*3/MM3 (ref 0.7–3.1)
LYMPHOCYTES NFR BLD AUTO: 8.7 % (ref 19.6–45.3)
MCH RBC QN AUTO: 28.8 PG (ref 26.6–33)
MCHC RBC AUTO-ENTMCNC: 32.7 G/DL (ref 31.5–35.7)
MCV RBC AUTO: 88.1 FL (ref 79–97)
MONOCYTES # BLD AUTO: 1.35 10*3/MM3 (ref 0.1–0.9)
MONOCYTES NFR BLD AUTO: 10.3 % (ref 5–12)
NEUTROPHILS NFR BLD AUTO: 10.41 10*3/MM3 (ref 1.7–7)
NEUTROPHILS NFR BLD AUTO: 79.3 % (ref 42.7–76)
NITRITE UR QL STRIP: NEGATIVE
NRBC BLD AUTO-RTO: 0 /100 WBC (ref 0–0.2)
PH UR STRIP.AUTO: 5.5 [PH] (ref 5–8)
PLATELET # BLD AUTO: 185 10*3/MM3 (ref 140–450)
PMV BLD AUTO: 9.5 FL (ref 6–12)
POTASSIUM SERPL-SCNC: 4.3 MMOL/L (ref 3.5–5.2)
PROT SERPL-MCNC: 6.8 G/DL (ref 6–8.5)
PROT UR QL STRIP: ABNORMAL
RBC # BLD AUTO: 5.11 10*6/MM3 (ref 4.14–5.8)
RBC # UR STRIP: ABNORMAL /HPF
REF LAB TEST METHOD: ABNORMAL
SODIUM SERPL-SCNC: 136 MMOL/L (ref 136–145)
SP GR UR STRIP: 1.03 (ref 1–1.03)
SQUAMOUS #/AREA URNS HPF: ABNORMAL /HPF
UROBILINOGEN UR QL STRIP: ABNORMAL
WBC # UR STRIP: ABNORMAL /HPF
WBC NRBC COR # BLD AUTO: 13.12 10*3/MM3 (ref 3.4–10.8)
WHOLE BLOOD HOLD COAG: NORMAL
WHOLE BLOOD HOLD SPECIMEN: NORMAL

## 2024-01-22 PROCEDURE — 81001 URINALYSIS AUTO W/SCOPE: CPT | Performed by: EMERGENCY MEDICINE

## 2024-01-22 PROCEDURE — 86140 C-REACTIVE PROTEIN: CPT | Performed by: EMERGENCY MEDICINE

## 2024-01-22 PROCEDURE — 96374 THER/PROPH/DIAG INJ IV PUSH: CPT

## 2024-01-22 PROCEDURE — 83690 ASSAY OF LIPASE: CPT | Performed by: EMERGENCY MEDICINE

## 2024-01-22 PROCEDURE — 74176 CT ABD & PELVIS W/O CONTRAST: CPT | Performed by: RADIOLOGY

## 2024-01-22 PROCEDURE — 80053 COMPREHEN METABOLIC PANEL: CPT | Performed by: EMERGENCY MEDICINE

## 2024-01-22 PROCEDURE — 25810000003 SODIUM CHLORIDE 0.9 % SOLUTION: Performed by: EMERGENCY MEDICINE

## 2024-01-22 PROCEDURE — 36415 COLL VENOUS BLD VENIPUNCTURE: CPT

## 2024-01-22 PROCEDURE — 74176 CT ABD & PELVIS W/O CONTRAST: CPT

## 2024-01-22 PROCEDURE — 85025 COMPLETE CBC W/AUTO DIFF WBC: CPT | Performed by: EMERGENCY MEDICINE

## 2024-01-22 PROCEDURE — 99284 EMERGENCY DEPT VISIT MOD MDM: CPT

## 2024-01-22 PROCEDURE — 25010000002 KETOROLAC TROMETHAMINE PER 15 MG: Performed by: EMERGENCY MEDICINE

## 2024-01-22 RX ORDER — CIPROFLOXACIN 500 MG/1
500 TABLET, FILM COATED ORAL 2 TIMES DAILY
Qty: 14 TABLET | Refills: 0 | Status: SHIPPED | OUTPATIENT
Start: 2024-01-22

## 2024-01-22 RX ORDER — SODIUM CHLORIDE 0.9 % (FLUSH) 0.9 %
10 SYRINGE (ML) INJECTION AS NEEDED
Status: DISCONTINUED | OUTPATIENT
Start: 2024-01-22 | End: 2024-01-22 | Stop reason: HOSPADM

## 2024-01-22 RX ORDER — ONDANSETRON 4 MG/1
4 TABLET, ORALLY DISINTEGRATING ORAL EVERY 6 HOURS PRN
Qty: 20 TABLET | Refills: 0 | Status: SHIPPED | OUTPATIENT
Start: 2024-01-22

## 2024-01-22 RX ORDER — IBUPROFEN 600 MG/1
600 TABLET ORAL EVERY 6 HOURS PRN
Qty: 30 TABLET | Refills: 0 | Status: SHIPPED | OUTPATIENT
Start: 2024-01-22

## 2024-01-22 RX ORDER — KETOROLAC TROMETHAMINE 30 MG/ML
30 INJECTION, SOLUTION INTRAMUSCULAR; INTRAVENOUS ONCE
Status: COMPLETED | OUTPATIENT
Start: 2024-01-22 | End: 2024-01-22

## 2024-01-22 RX ORDER — HYDROCODONE BITARTRATE AND ACETAMINOPHEN 7.5; 325 MG/1; MG/1
1 TABLET ORAL EVERY 6 HOURS PRN
Qty: 12 TABLET | Refills: 0 | Status: SHIPPED | OUTPATIENT
Start: 2024-01-22 | End: 2024-01-22

## 2024-01-22 RX ORDER — TAMSULOSIN HYDROCHLORIDE 0.4 MG/1
1 CAPSULE ORAL DAILY
Qty: 30 CAPSULE | Refills: 0 | Status: SHIPPED | OUTPATIENT
Start: 2024-01-22

## 2024-01-22 RX ORDER — HYDROCODONE BITARTRATE AND ACETAMINOPHEN 10; 325 MG/1; MG/1
1 TABLET ORAL EVERY 6 HOURS PRN
Qty: 12 TABLET | Refills: 0 | Status: SHIPPED | OUTPATIENT
Start: 2024-01-22

## 2024-01-22 RX ADMIN — KETOROLAC TROMETHAMINE 30 MG: 30 INJECTION, SOLUTION INTRAMUSCULAR; INTRAVENOUS at 08:40

## 2024-01-22 RX ADMIN — SODIUM CHLORIDE 1000 ML: 9 INJECTION, SOLUTION INTRAVENOUS at 08:29

## 2024-01-22 NOTE — ED PROVIDER NOTES
"Subjective   History of Present Illness  Patient is a 53-year-old male who presents with a chief complaint of \"I think I am having diverticulitis again\".  He complains of pain in his right lower back, right flank, right mid abdomen associated with nausea and dry heaves but no actual emesis.  He denies any diarrhea, hematochezia or melena.  He has had no hematemesis.  He states that this started Saturday morning, approximately 48 hours ago.  He states that he has had diverticulitis previously that hurt exactly like this and in exactly the same location.  He also states that he has had kidney stones in the past, but that this does not feel like a kidney stone.  He denies pain or problems of any kind in his genitalia.  He denies other symptoms or other complaints.  He tells me that he does not wish to have any narcotics.  Patient does have a history of diabetes, coronary artery disease, hypertension, dyslipidemia.  He states that he quit taking all of his medications 3 months ago and that he has felt much better without his medications.      Review of Systems   All other systems reviewed and are negative.      Past Medical History:   Diagnosis Date    Arthritis     Coronary artery disease     Diabetes mellitus     Elevated cholesterol     Fracture     GERD (gastroesophageal reflux disease)     Hyperlipidemia     Hypertension     Kidney stones     Sleep apnea     no c-pap       Allergies   Allergen Reactions    Penicillins Hives       Past Surgical History:   Procedure Laterality Date    CARDIAC CATHETERIZATION      COLONOSCOPY      CORONARY STENT PLACEMENT  2015    x1 stent [facility]    EXTRACORPOREAL SHOCK WAVE LITHOTRIPSY (ESWL) Right 8/21/2020    Procedure: EXTRACORPOREAL SHOCKWAVE LITHOTRIPSY;  Surgeon: Pablo Crenshaw MD;  Location: [facility];  Service: Urology;  Laterality: Right;    EXTRACORPOREAL SHOCK WAVE LITHOTRIPSY (ESWL) Right 12/4/2020    Procedure: EXTRACORPOREAL SHOCKWAVE LITHOTRIPSY;  " Surgeon: Pablo Crenshaw MD;  Location: [facility];  Service: Urology;  Laterality: Right;    EXTRACORPOREAL SHOCK WAVE LITHOTRIPSY (ESWL) Right 2021    Procedure: EXTRACORPOREAL SHOCKWAVE LITHOTRIPSY;  Surgeon: Pablo Crenshaw MD;  Location: [facility] OR;  Service: Urology;  Laterality: Right;    FRACTURE SURGERY Right     orif-ankle    ORIF FIBULA FRACTURE Right 2019    Procedure: OPEN REDUCTION INTERNAL FIXATION RIGHT DISTAL FIBULA;  Surgeon: Christian Brooks MD;  Location: [facility];  Service: Orthopedics    SHOULDER SURGERY Left 2022    Arthroscopic rotator cuff repair - Dr. Blas       Family History   Problem Relation Age of Onset    Heart disease Father     Heart attack Maternal Grandmother        Social History     Socioeconomic History    Marital status:    Tobacco Use    Smoking status: Former     Packs/day: 0.50     Years: 10.00     Additional pack years: 0.00     Total pack years: 5.00     Types: Cigarettes     Quit date:      Years since quittin.0    Smokeless tobacco: Never   Vaping Use    Vaping Use: Never used   Substance and Sexual Activity    Alcohol use: Yes     Comment: social    Drug use: Never    Sexual activity: Defer           Objective   Physical Exam  Vitals and nursing note reviewed.   Constitutional:       General: He is not in acute distress.     Appearance: He is well-developed. He is not toxic-appearing or diaphoretic.      Comments: Well-developed well-nourished male, pleasant cooperative, alert and well-oriented.  He does not appear to be in acute distress.   HENT:      Head: Normocephalic and atraumatic.   Eyes:      General: No scleral icterus.     Pupils: Pupils are equal, round, and reactive to light.   Neck:      Trachea: No tracheal deviation.   Cardiovascular:      Rate and Rhythm: Normal rate and regular rhythm.   Pulmonary:      Effort: Pulmonary effort is normal. No respiratory distress.      Breath sounds: Normal breath  sounds.   Chest:      Chest wall: No tenderness.   Abdominal:      General: Bowel sounds are normal.      Palpations: Abdomen is soft.      Tenderness: There is abdominal tenderness. There is no right CVA tenderness, left CVA tenderness, guarding or rebound.      Comments: There is tenderness to palpation in the right lumbar area, the right flank and the right mid abdomen.  No other tenderness.  No acute peritoneal signs.  No costovertebral angle tenderness.   Musculoskeletal:         General: No tenderness. Normal range of motion.      Cervical back: Normal range of motion and neck supple. No rigidity or tenderness.      Right lower leg: No edema.      Left lower leg: No edema.   Skin:     General: Skin is warm and dry.      Capillary Refill: Capillary refill takes less than 2 seconds.      Coloration: Skin is not pale.   Neurological:      General: No focal deficit present.      Mental Status: He is alert and oriented to person, place, and time.      GCS: GCS eye subscore is 4. GCS verbal subscore is 5. GCS motor subscore is 6.      Motor: No abnormal muscle tone.   Psychiatric:         Mood and Affect: Mood normal.         Behavior: Behavior normal.         Procedures  Results for orders placed or performed during the hospital encounter of 01/22/24   Comprehensive Metabolic Panel    Specimen: Arm, Left; Blood   Result Value Ref Range    Glucose 227 (H) 65 - 99 mg/dL    BUN 19 6 - 20 mg/dL    Creatinine 1.53 (H) 0.76 - 1.27 mg/dL    Sodium 136 136 - 145 mmol/L    Potassium 4.3 3.5 - 5.2 mmol/L    Chloride 106 98 - 107 mmol/L    CO2 21.4 (L) 22.0 - 29.0 mmol/L    Calcium 8.6 8.6 - 10.5 mg/dL    Total Protein 6.8 6.0 - 8.5 g/dL    Albumin 3.7 3.5 - 5.2 g/dL    ALT (SGPT) 30 1 - 41 U/L    AST (SGOT) 16 1 - 40 U/L    Alkaline Phosphatase 55 39 - 117 U/L    Total Bilirubin 0.8 0.0 - 1.2 mg/dL    Globulin 3.1 gm/dL    A/G Ratio 1.2 g/dL    BUN/Creatinine Ratio 12.4 7.0 - 25.0    Anion Gap 8.6 5.0 - 15.0 mmol/L    eGFR  53.4 (L) >60.0 mL/min/1.73   Urinalysis With Microscopic If Indicated (No Culture) - Urine, Clean Catch    Specimen: Urine, Clean Catch   Result Value Ref Range    Color, UA Yellow Yellow, Straw    Appearance, UA Clear Clear    pH, UA 5.5 5.0 - 8.0    Specific Gravity, UA 1.026 1.005 - 1.030    Glucose,  mg/dL (2+) (A) Negative    Ketones, UA Negative Negative    Bilirubin, UA Negative Negative    Blood, UA Trace (A) Negative    Protein, UA Trace (A) Negative    Leuk Esterase, UA Negative Negative    Nitrite, UA Negative Negative    Urobilinogen, UA 0.2 E.U./dL 0.2 - 1.0 E.U./dL   CBC Auto Differential    Specimen: Arm, Left; Blood   Result Value Ref Range    WBC 13.12 (H) 3.40 - 10.80 10*3/mm3    RBC 5.11 4.14 - 5.80 10*6/mm3    Hemoglobin 14.7 13.0 - 17.7 g/dL    Hematocrit 45.0 37.5 - 51.0 %    MCV 88.1 79.0 - 97.0 fL    MCH 28.8 26.6 - 33.0 pg    MCHC 32.7 31.5 - 35.7 g/dL    RDW 13.2 12.3 - 15.4 %    RDW-SD 42.8 37.0 - 54.0 fl    MPV 9.5 6.0 - 12.0 fL    Platelets 185 140 - 450 10*3/mm3    Neutrophil % 79.3 (H) 42.7 - 76.0 %    Lymphocyte % 8.7 (L) 19.6 - 45.3 %    Monocyte % 10.3 5.0 - 12.0 %    Eosinophil % 0.7 0.3 - 6.2 %    Basophil % 0.5 0.0 - 1.5 %    Immature Grans % 0.5 0.0 - 0.5 %    Neutrophils, Absolute 10.41 (H) 1.70 - 7.00 10*3/mm3    Lymphocytes, Absolute 1.14 0.70 - 3.10 10*3/mm3    Monocytes, Absolute 1.35 (H) 0.10 - 0.90 10*3/mm3    Eosinophils, Absolute 0.09 0.00 - 0.40 10*3/mm3    Basophils, Absolute 0.06 0.00 - 0.20 10*3/mm3    Immature Grans, Absolute 0.07 (H) 0.00 - 0.05 10*3/mm3    nRBC 0.0 0.0 - 0.2 /100 WBC   Lipase    Specimen: Arm, Left; Blood   Result Value Ref Range    Lipase 139 (H) 13 - 60 U/L   C-reactive Protein    Specimen: Arm, Left; Blood   Result Value Ref Range    C-Reactive Protein 5.29 (H) 0.00 - 0.50 mg/dL   Urinalysis, Microscopic Only - Urine, Clean Catch    Specimen: Urine, Clean Catch   Result Value Ref Range    RBC, UA 3-5 (A) None Seen, 0-2 /HPF    WBC, UA  0-2 None Seen, 0-2 /HPF    Bacteria, UA None Seen None Seen /HPF    Squamous Epithelial Cells, UA None Seen None Seen, 0-2 /HPF    Hyaline Casts, UA None Seen None Seen /LPF    Methodology Automated Microscopy    Green Top (Gel)   Result Value Ref Range    Extra Tube Hold for add-ons.    Lavender Top   Result Value Ref Range    Extra Tube hold for add-on    Gold Top - SST   Result Value Ref Range    Extra Tube Hold for add-ons.    Light Blue Top   Result Value Ref Range    Extra Tube Hold for add-ons.      CT Abdomen Pelvis Without Contrast    Result Date: 1/22/2024  Narrative: CLINICAL HISTORY: Right flank pain.  COMPARISON: 8/25/2023.  TECHNIQUE: Noncontrast CT of the abdomen and pelvis was obtained. Multiplanar reformats were generated.  Limited exposure control, adjustment of the mA and/or KV according to patient size or use of iterative reconstruction technique was utilized.  FINDINGS:  Lower chest: Mild bibasilar atelectasis.  Hepatobiliary: Fatty liver.  Normal gallbladder.  Pancreas: normal.  Spleen: normal.  Adrenals: normal.  Kidneys, ureters, bladder: Mild right hydronephrosis due to an obstructing 7 mm calculus in the mid right ureter.  The left kidney and ureter are normal.  Urinary bladder is normal.  Reproductive: normal.  GI tract/Bowel: Sigmoid diverticulosis without findings for acute diverticulitis.  Additional scattered diverticula are noted in the descending colon.  The bowel is nonobstructed.  No pneumatosis or portal venous gas is seen.  Appendix: normal.  Peritoneum: normal, no free air or fluid.  Vascular: normal.  Lymph nodes: normal.  Musculoskeletal and abdominal wall: A small ventral hernia contains fat.  Multilevel degenerative changes are noted in the lumbar spine.      Impression:  OBSTRUCTING 7 MM CALCULUS IN THE MID RIGHT URETER WITH MILD HYDRONEPHROSIS.  FATTY LIVER.      This report was finalized on 1/22/2024 7:54 AM by Leonor Sheriff MD.              ED Course  ED Course as of  01/22/24 0932   Mon Jan 22, 2024   0700 Case discussed with and care endorsed to Dr. Carbajal at shift change, pending CT. [CM]      ED Course User Index  [CM] Jovanny Montoya MD                                             Medical Decision Making  Problems Addressed:  Renal colic on right side: complicated acute illness or injury  Ureterolithiasis: complicated acute illness or injury    Amount and/or Complexity of Data Reviewed  Labs: ordered.  Radiology: ordered.    Risk  Prescription drug management.        Final diagnoses:   Ureterolithiasis   Renal colic on right side       ED Disposition  ED Disposition       ED Disposition   Discharge    Condition   Stable    Comment   --               Pablo Crenshaw MD  [address]  [place] [address]  [phone]    Schedule an appointment as soon as possible for a visit            Medication List        New Prescriptions      ciprofloxacin 500 MG tablet  Commonly known as: CIPRO  Take 1 tablet by mouth 2 (Two) Times a Day.     HYDROcodone-acetaminophen 7.5-325 MG per tablet  Commonly known as: NORCO  Take 1 tablet by mouth Every 6 (Six) Hours As Needed for Moderate Pain.     ibuprofen 600 MG tablet  Commonly known as: ADVIL,MOTRIN  Take 1 tablet by mouth Every 6 (Six) Hours As Needed for Moderate Pain.     ondansetron ODT 4 MG disintegrating tablet  Commonly known as: ZOFRAN-ODT  Place 1 tablet on the tongue Every 6 (Six) Hours As Needed for Nausea or Vomiting.            Stop      cyclobenzaprine 10 MG tablet  Commonly known as: FLEXERIL     hydrOXYzine 10 MG tablet  Commonly known as: ATARAX     ketorolac 10 MG tablet  Commonly known as: TORADOL     Ozempic (0.25 or 0.5 MG/DOSE) 2 MG/1.5ML solution pen-injector  Generic drug: Semaglutide(0.25 or 0.5MG/DOS)               Where to Get Your Medications        These medications were sent to [facility] [id number] - [place], [place] - [address] [facility] [phone]  -  [phone] FX  [address] [place] [address]      Phone: [phone]   ciprofloxacin 500 MG tablet  HYDROcodone-acetaminophen 7.5-325 MG per tablet  ibuprofen 600 MG tablet  ondansetron ODT 4 MG disintegrating tablet  tamsulosin 0.4 MG capsule 24 hr capsule            Lonnie Carbajal MD  01/22/24 0955

## 2024-01-22 NOTE — ED NOTES
Pt provided a urinal and informed that we need a urine sample whenever he is able to provide one. Pt reports that he will attempt to provide a sample at this time.

## 2024-01-25 ENCOUNTER — OFFICE VISIT (OUTPATIENT)
Dept: UROLOGY | Facility: CLINIC | Age: 54
End: 2024-01-25
Payer: COMMERCIAL

## 2024-01-25 VITALS
BODY MASS INDEX: 40.02 KG/M2 | SYSTOLIC BLOOD PRESSURE: 137 MMHG | WEIGHT: 255 LBS | HEART RATE: 103 BPM | HEIGHT: 67 IN | DIASTOLIC BLOOD PRESSURE: 96 MMHG

## 2024-01-25 DIAGNOSIS — N13.8 BPH WITH OBSTRUCTION/LOWER URINARY TRACT SYMPTOMS: ICD-10-CM

## 2024-01-25 DIAGNOSIS — N40.1 BPH WITH OBSTRUCTION/LOWER URINARY TRACT SYMPTOMS: ICD-10-CM

## 2024-01-25 DIAGNOSIS — N20.0 RIGHT NEPHROLITHIASIS: ICD-10-CM

## 2024-01-25 DIAGNOSIS — N20.0 BILATERAL NEPHROLITHIASIS: ICD-10-CM

## 2024-01-25 DIAGNOSIS — R10.9 BILATERAL FLANK PAIN: ICD-10-CM

## 2024-01-25 DIAGNOSIS — N20.1 RIGHT URETERAL STONE: Primary | ICD-10-CM

## 2024-01-25 PROCEDURE — 1160F RVW MEDS BY RX/DR IN RCRD: CPT | Performed by: NURSE PRACTITIONER

## 2024-01-25 PROCEDURE — 3080F DIAST BP >= 90 MM HG: CPT | Performed by: NURSE PRACTITIONER

## 2024-01-25 PROCEDURE — 3075F SYST BP GE 130 - 139MM HG: CPT | Performed by: NURSE PRACTITIONER

## 2024-01-25 PROCEDURE — 1159F MED LIST DOCD IN RCRD: CPT | Performed by: NURSE PRACTITIONER

## 2024-01-25 PROCEDURE — 99214 OFFICE O/P EST MOD 30 MIN: CPT | Performed by: NURSE PRACTITIONER

## 2024-01-25 RX ORDER — GENTAMICIN SULFATE 80 MG/100ML
80 INJECTION, SOLUTION INTRAVENOUS ONCE
OUTPATIENT
Start: 2024-01-25

## 2024-01-25 NOTE — PROGRESS NOTES
Chief Complaint  RIGHT UPJ STONE/FLANK/BACK PAIN (ER FOLLOW UP kidney stone/abdominal pain)    Luis Sneed presents to [facility] for RIGHT UPJ STONE/FLANK PAIN  History of Present Illness    Mr. Austin Sneed is a pleasant 53-year-old male who presents to clinic today for evaluation for kidney stones. Patient is also an ER follow-up. States he presented to [facility] ED on 01/22/2024. This was secondary to 10 on 10 right greater than left  flank pain radiating to his right lower back, right mid abdomen causing significant pelvic pressure and suprapubic discomfort, and also associated with nausea and dry heaves. No actual emesis. Patient did deny any diarrhea or hematochezia. He had concerns about possible diverticulitis.     Nevertheless, secondary to his significant history of kidney stones, he had a CT abdomen and pelvis completed which I have reviewed showing 7.5 mm proximal mid right ureteral stone causing mild to moderate hydronephrosis and hydroureter. Patient's left kidney and ureter adenoma. Urinary bladder is also normal.     On initial presentation in clinic today, he is in no apparent discomfort. Still reports colicky, intermittent right flank and lower back pain 5/10, but reports not as bad compared to prior to ER follow-up. He is unable to give us any urine for analysis. The goal will be to schedule him for surgical intervention with Dr. Crenshaw.    The patient states he was AT HOME, and woke up on 01/21/2024 and could not move. He was sweating and clammy from severe right flank pain and discomfort requiring his ER evaluation. The patient states he is feeling better today. He rates his pain as a 5 out of 10. He declines a Toradol injection today.    The patient states he stopped taking all of his medications 3 months ago including his Plavix, antihypertensive, and blood sugar medications, even though he states he had a stent placed.   Patient reports this medication just made him feel bad all the time and he has felt significantly better after stopping everything    Asked about his A1c and the impact of diabetic Beatties on his entire health, the patient states he does not check his blood glucose. He states he feels better since he has not taken any of his medications. He states he drinks a lot of pomegranate juice.  Explained the importance of resuming medications especially Plavix, antihypertensive, and his blood sugar medicines, patient to follow-up with PCP for reevaluation     He has been scheduled for right ureteroscopy, laser lithotripsy, possible right stent on 01/31/2024 with Dr. Crenshaw for his right 7.5 mm UPJ stone    Patient is agreeable plan of care.    IMPRESSION CT 1/22/23:idneys, ureters, bladder: Mild right hydronephrosis due to an obstructing 7 mm calculus in the mid right ureter.  The left kidney and ureter are normal.  Urinary bladder is normal.   OBSTRUCTING 7 MM CALCULUS IN THE MID RIGHT URETER WITH MILD HYDRONEPHROSIS.     Active Ambulatory Problems     Diagnosis Date Noted    Precordial pain 07/25/2018    Dyspnea on exertion 07/25/2018    ASCVD (arteriosclerotic cardiovascular disease), status post stenting about 3 years ago in [facility].  07/25/2018    Chest pain 07/25/2018    Erectile dysfunction 10/26/2018    Closed nondisplaced fracture of lateral malleolus of right fibula 12/10/2019    Morbidly obese 12/12/2019    Type 2 diabetes mellitus without complication, without long-term current use of insulin 12/12/2019    Displaced fracture of lateral malleolus of right fibula, initial encounter for closed fracture 12/13/2019    Renal calculus, right 08/12/2020    Umbilical hernia without obstruction and without gangrene 09/09/2020    Gross hematuria 11/20/2020    Ureteral calculus 03/05/2021    Acute pain of left shoulder 05/26/2021    Osteoarthritis of left AC (acromioclavicular) joint 05/29/2021    Rotator cuff  "tendinitis, left 05/29/2021    Traumatic complete tear of left rotator cuff 12/09/2021    Biceps tendinitis of left upper extremity 12/09/2021    Impingement syndrome of left shoulder 12/09/2021    Bursitis of left shoulder 12/09/2021    Acute flank pain 04/13/2023    Essential hypertension 08/21/2023    Right nephrolithiasis 01/25/2024    Bilateral nephrolithiasis 01/25/2024    Right ureteral stone 01/25/2024    Bilateral flank pain 01/25/2024    BPH with obstruction/lower urinary tract symptoms 01/25/2024     Resolved Ambulatory Problems     Diagnosis Date Noted    No Resolved Ambulatory Problems     Past Medical History:   Diagnosis Date    Arthritis     Coronary artery disease     Diabetes mellitus     Elevated cholesterol     Fracture     GERD (gastroesophageal reflux disease)     Hyperlipidemia     Hypertension     Kidney stones     Sleep apnea       Objective   Vital Signs:   /96 (BP Location: Left arm, Patient Position: Sitting)   Pulse 103   Ht 170.2 cm (67.01\")   Wt 116 kg (255 lb)   BMI 39.93 kg/m²       ROS:   Review of Systems   Constitutional:  Positive for activity change, appetite change and unexpected weight gain. Negative for chills, diaphoresis, fatigue, fever and unexpected weight loss.   HENT: Negative.  Negative for congestion, ear discharge, ear pain, nosebleeds, rhinorrhea, sinus pressure and sore throat.    Eyes: Negative.  Negative for blurred vision, double vision, photophobia, pain, redness and visual disturbance.   Respiratory:  Negative for apnea, cough, chest tightness, shortness of breath, wheezing and stridor.    Cardiovascular:  Negative for chest pain, palpitations and leg swelling.   Gastrointestinal:  Positive for abdominal distention, abdominal pain and constipation. Negative for diarrhea, nausea and vomiting.   Endocrine: Negative for polydipsia, polyphagia and polyuria.   Genitourinary:  Positive for decreased urine volume, flank pain, frequency, hematuria and " nocturia. Negative for difficulty urinating, discharge, dysuria, genital sores, penile pain, penile swelling, scrotal swelling, testicular pain, urgency and urinary incontinence.   Musculoskeletal:  Negative for arthralgias, back pain and joint swelling.   Skin: Negative.  Positive for pallor. Negative for rash and wound.   Allergic/Immunologic: Negative.    Neurological:  Negative for dizziness, tremors, syncope, weakness, light-headedness, headache, memory problem and confusion.   Hematological: Negative.    Psychiatric/Behavioral: Negative.  Positive for sleep disturbance and stress. Negative for agitation, behavioral problems and decreased concentration.         Physical Exam  Constitutional:       General: He is in acute distress.      Appearance: He is well-developed. He is obese. He is ill-appearing.   HENT:      Head: Normocephalic and atraumatic.      Right Ear: External ear normal.      Left Ear: External ear normal.   Eyes:      General:         Right eye: No discharge.         Left eye: No discharge.      Conjunctiva/sclera: Conjunctivae normal.      Pupils: Pupils are equal, round, and reactive to light.   Neck:      Thyroid: No thyromegaly.      Trachea: No tracheal deviation.   Cardiovascular:      Rate and Rhythm: Normal rate and regular rhythm.      Heart sounds: No murmur heard.     No friction rub.   Pulmonary:      Effort: Pulmonary effort is normal. No respiratory distress.      Breath sounds: Normal breath sounds. No stridor.   Abdominal:      General: Bowel sounds are normal. There is distension.      Palpations: Abdomen is soft.      Tenderness: There is abdominal tenderness. There is no guarding.   Genitourinary:     Penis: Normal and uncircumcised. No tenderness or discharge.       Testes: Normal.      Rectum: Normal. Guaiac result negative.      Comments: URINE FREQUENCY/DYSURIA  Musculoskeletal:         General: Tenderness present. No deformity. Normal range of motion.      Cervical back:  Normal range of motion and neck supple.   Skin:     General: Skin is warm and dry.      Capillary Refill: Capillary refill takes less than 2 seconds.      Coloration: Skin is pale.   Neurological:      Mental Status: He is alert and oriented to person, place, and time.      Cranial Nerves: No cranial nerve deficit.      Motor: Weakness present.      Coordination: Coordination normal.   Psychiatric:         Behavior: Behavior normal.         Thought Content: Thought content normal.         Judgment: Judgment normal.        Result Review :     UA          4/13/2023    12:23 8/26/2023    00:35 1/22/2024    06:16   Urinalysis   Squamous Epithelial Cells, UA   None Seen    Specific Gravity, UA  >1.030  1.026    Ketones, UA Trace  Negative  Negative    Blood, UA  Negative  Trace    Leukocytes, UA Negative  Negative  Negative    Nitrite, UA  Negative  Negative    RBC, UA   3-5    WBC, UA   0-2    Bacteria, UA   None Seen      Urine Culture          4/13/2023    12:25   Urine Culture   Urine Culture No growth             Assessment and Plan    Problem List Items Addressed This Visit          Gastrointestinal Abdominal     Bilateral flank pain    Relevant Orders    Case Request (Completed)       Genitourinary and Reproductive     Right nephrolithiasis    Relevant Orders    Case Request (Completed)    Bilateral nephrolithiasis    Relevant Orders    Case Request (Completed)    Right ureteral stone - Primary    Relevant Orders    Case Request (Completed)    BPH with obstruction/lower urinary tract symptoms                                          ASSESSMENT  RIGHT PROXIMAL UPJ STONE/FLANK/ABD/BACK PAIN/BPH W. LUTS  Mr. Austin Sneed is a pleasant 53-year-old male who returns to clinic today for evaluation. Initially seen on 08/11/2023 for back pain. Patient has a significant history of recurrent kidney stones. When evaluated in clinic, he had right side greater than left side flank pain, lower abdominal pain, and discomfort that  had become very bothersome to him.  He deferred surgical intervention at that time.  He had denied any urinary episodes of frequency, urgency, or dysuria. He did haveBPH WITH LUTS, ALSO complaints of erectile dysfunction and was doing relatively well on sildenafil on as needed basis.    Right Proximal Ureteral Calculus/FLANK PAIN/DYSURIA:The Patient has been diagnosed with a 7.5MM right ureteral calculus.  He presents to clinic today with right flank pain that is colicky in nature, intermittent but become very bothersome to him.      We have discussed the various parameters regarding spontaneous passage including the notion that a tiny 1 to 4 mm stone, has a 90% high likelihood of spontaneous passage versus a larger stone of greater than 7.5 like he has being caught up in the upper areas of the urinary tract. We also discussed the medical management of stone disease and the use of medical expulsive therapy in the form of Flomax. This is used in an off label setting. I also talked about nonoperative management including ambulation and increasing fluids and hot tub as being an effective adjuncts in the treatment of a ureteral stone. We discussed the presence of the stone. We discussed the various therapeutic options available including percutaneous nephrostolithotomy, lithotripsy, ESWL.       ESWL-the patient is a candidate for extracorporeal shockwave lithotripsy.  We discussed the type of stone.  And the complications associated with the procedure including but not limited to pain in the flank, hematoma, spontaneous renal hemorrhage, and adequate fragmentation of stones.  The need for passage of the stones.  The need for concomitant additional procedures in the range of 24% the risk of a distal fragment in the range of 3% requiring ureteroscopic. We discussed the indicators for intervention including  absolute indicators such as sepsis and uncontrollable severe pain as well as  the relative indicators of moderate  pain that is well-controlled with various analgesia.                                                    PLAN  PATIENT HAS BEEN SCHEDULED FOR RIGHT URETEROSCOPY LASER LITHOTRIPSY WITH POSSIBLE RIGHT STENT ON WEDNESDAY 01/31/2024 BY DR. ESCOTO    Patient have adequate Narcotic pain medication hydrocodone 10 /325 given in ER for severe pain.    He has been given nonnarcotic pain medication and advised to continue with Toradol for breakthrough pain as needed.  Also nausea medicine, Flomax for medical expulsive therapy      We discussed OTHER  treatment options for HIS BACK/FLANK pain with patient encouraged to continue conservative therapy alternating NSAID/Tylenol as tolerated, Also including hot baths, showers, warm compresses to lower back as tolerated. Also encouraged walking, physical therapy, light exercises as tolerated     Rest is the most important treatment in the case of BACK/FLANK pain. Rest and physical therapy are enough to improve minor pain. Discussed to monitor his daily routine with prevention of flank pain by: At least Drinking 8 glasses of water per day, Limiting your alcohol consumption.  Having a healthy diet containing fruits, vegetables, and a lot of fluids, Practicing safe sex.  Also maintaining proper hygiene of your body as well as the environment.     Discussed a kidney stone prevention diet to include increasing p.o. fluid intake, to at least 1 to 2 L of water daily.  He is to avoid caffeine products such as cola, coffee, and to avoid soft or soda drinks.     He is to decrease her sodium consumption as in  Fast foods, torres, salted nuts, canned foods, and smoked/cured foods. He is also to decrease her oxalate consumption, as in spinach, Nery greens, and Rhubarb.  Also important is to decrease protein intake, as in red meats, peanut butter, and also avoid nuts.    We will follow-up in clinic POST PROCEDURE  for BPH with LUTS as discussed.     HE MAY RETURN SOONER IF NEED BE      PATIENT  IS AGREEABLE WITH PLAN OF CARE     Patient reports that he is not currently experiencing any symptoms of urinary incontinence.    RADIOLOGY (CT AND/OR KUB):    CT Abdomen and Pelvis: No results found for this or any previous visit.     CT Stone Protocol: Results for orders placed in visit on 01/24/23    CT Abdomen Pelvis Stone Protocol    Narrative  EXAM: CT ABDOMEN PELVIS STONE PROTOCOL-      TECHNIQUE: Multiple axial CT images were obtained from lung bases  through pubic symphysis WITHOUT administration of IV contrast.  Reformatted images in the coronal and/or sagittal plane(s) were  generated from the axial data set to facilitate diagnostic accuracy  and/or surgical planning.  Oral Contrast:NONE.    Radiation dose reduction techniques were utilized per ALARA protocol.  Automated exposure control was initiated through either or Webspy or  DoseRight software packages by  protocol.  DOSE: 1194.69 mGy.cm    Clinical information Flank pain, kidney stone suspected    Comparison 12/31/2020    FINDINGS:    Lower thorax: Clear. No effusions.    Abdomen:    Liver: Homogeneous. No focal hepatic mass or ductal dilatation.    Gallbladder: No dilation or stone identified.    Pancreas: Unremarkable. No mass or ductal dilatation.    Spleen: Homogeneous. No splenomegaly.    Adrenals: No mass.    Kidneys/ureters: Nonobstructing intrarenal stones bilaterally    GI tract: Moderate stool burden. There is no evidence of appendicitis    MESENTERY: No free fluid, walled off fluid collections, mesenteric  stranding, or enlarged lymph nodes      Vasculature: No evidence of aneurysm.    Abdominal wall: Fat-containing umbilical hernia    Bladder: No focal mass or significant wall thickening    Reproductive: Unremarkable as visualized    Bones: Arthritic change. Disc space narrowing at L5-S1    Impression  1. Nonobstructing intrarenal stones bilaterally    2.Fat-containing ventral hernia    3. Sigmoid diverticuli without  diverticulitis at the time of the study    This report was finalized on 2/2/2023 1:56 PM by Dr. Gaston Dempsey MD.     KUB: Results for orders placed in visit on 04/13/23    XR abdomen kub    Narrative  EXAM:  XR Abdomen, 1 View    EXAM DATE:  4/13/2023 12:01 PM    CLINICAL HISTORY:  Kidney stone; R10.9-Unspecified abdominal pain    TECHNIQUE:  Frontal supine view of the abdomen/pelvis.    COMPARISON:  01/24/2023    FINDINGS:  Gastrointestinal tract:  Unremarkable.  No dilation.  Organs:  Right kidney stone is stable.  Calcification at the level of  S1 overlying the right iliac wing could represent a distal ureter stone  or may represent phlebolith. No change from previous.  Bones/joints:  Unremarkable.    Impression  1.  Right kidney stone is stable.  2.  Calcification at the level of S1 overlying the right iliac wing  could represent a distal ureter stone or may represent phlebolith. No  change from previous.    This report was finalized on 4/13/2023 1:05 PM by Dr. Logan Reyes MD.       LABS (3 MONTHS):    Admission on 01/22/2024, Discharged on 01/22/2024   Component Date Value Ref Range Status    Glucose 01/22/2024 227 (H)  65 - 99 mg/dL Final    BUN 01/22/2024 19  6 - 20 mg/dL Final    Creatinine 01/22/2024 1.53 (H)  0.76 - 1.27 mg/dL Final    Sodium 01/22/2024 136  136 - 145 mmol/L Final    Potassium 01/22/2024 4.3  3.5 - 5.2 mmol/L Final    Slight hemolysis detected by analyzer. Result may be falsely elevated.    Chloride 01/22/2024 106  98 - 107 mmol/L Final    CO2 01/22/2024 21.4 (L)  22.0 - 29.0 mmol/L Final    Calcium 01/22/2024 8.6  8.6 - 10.5 mg/dL Final    Total Protein 01/22/2024 6.8  6.0 - 8.5 g/dL Final    Albumin 01/22/2024 3.7  3.5 - 5.2 g/dL Final    ALT (SGPT) 01/22/2024 30  1 - 41 U/L Final    AST (SGOT) 01/22/2024 16  1 - 40 U/L Final    Alkaline Phosphatase 01/22/2024 55  39 - 117 U/L Final    Total Bilirubin 01/22/2024 0.8  0.0 - 1.2 mg/dL Final    Globulin 01/22/2024 3.1  gm/dL Final     A/G Ratio 01/22/2024 1.2  g/dL Final    BUN/Creatinine Ratio 01/22/2024 12.4  7.0 - 25.0 Final    Anion Gap 01/22/2024 8.6  5.0 - 15.0 mmol/L Final    eGFR 01/22/2024 53.4 (L)  >60.0 mL/min/1.73 Final    Color, UA 01/22/2024 Yellow  Yellow, Straw Final    Appearance, UA 01/22/2024 Clear  Clear Final    pH, UA 01/22/2024 5.5  5.0 - 8.0 Final    Specific Gravity, UA 01/22/2024 1.026  1.005 - 1.030 Final    Glucose, UA 01/22/2024 500 mg/dL (2+) (A)  Negative Final    Ketones, UA 01/22/2024 Negative  Negative Final    Bilirubin, UA 01/22/2024 Negative  Negative Final    Blood, UA 01/22/2024 Trace (A)  Negative Final    Protein, UA 01/22/2024 Trace (A)  Negative Final    Leuk Esterase, UA 01/22/2024 Negative  Negative Final    Nitrite, UA 01/22/2024 Negative  Negative Final    Urobilinogen, UA 01/22/2024 0.2 E.U./dL  0.2 - 1.0 E.U./dL Final    Extra Tube 01/22/2024 Hold for add-ons.   Final    Auto resulted.    Extra Tube 01/22/2024 hold for add-on   Final    Auto resulted    Extra Tube 01/22/2024 Hold for add-ons.   Final    Auto resulted.    Extra Tube 01/22/2024 Hold for add-ons.   Final    Auto resulted    WBC 01/22/2024 13.12 (H)  3.40 - 10.80 10*3/mm3 Final    RBC 01/22/2024 5.11  4.14 - 5.80 10*6/mm3 Final    Hemoglobin 01/22/2024 14.7  13.0 - 17.7 g/dL Final    Hematocrit 01/22/2024 45.0  37.5 - 51.0 % Final    MCV 01/22/2024 88.1  79.0 - 97.0 fL Final    MCH 01/22/2024 28.8  26.6 - 33.0 pg Final    MCHC 01/22/2024 32.7  31.5 - 35.7 g/dL Final    RDW 01/22/2024 13.2  12.3 - 15.4 % Final    RDW-SD 01/22/2024 42.8  37.0 - 54.0 fl Final    MPV 01/22/2024 9.5  6.0 - 12.0 fL Final    Platelets 01/22/2024 185  140 - 450 10*3/mm3 Final    Neutrophil % 01/22/2024 79.3 (H)  42.7 - 76.0 % Final    Lymphocyte % 01/22/2024 8.7 (L)  19.6 - 45.3 % Final    Monocyte % 01/22/2024 10.3  5.0 - 12.0 % Final    Eosinophil % 01/22/2024 0.7  0.3 - 6.2 % Final    Basophil % 01/22/2024 0.5  0.0 - 1.5 % Final    Immature Grans %  01/22/2024 0.5  0.0 - 0.5 % Final    Neutrophils, Absolute 01/22/2024 10.41 (H)  1.70 - 7.00 10*3/mm3 Final    Lymphocytes, Absolute 01/22/2024 1.14  0.70 - 3.10 10*3/mm3 Final    Monocytes, Absolute 01/22/2024 1.35 (H)  0.10 - 0.90 10*3/mm3 Final    Eosinophils, Absolute 01/22/2024 0.09  0.00 - 0.40 10*3/mm3 Final    Basophils, Absolute 01/22/2024 0.06  0.00 - 0.20 10*3/mm3 Final    Immature Grans, Absolute 01/22/2024 0.07 (H)  0.00 - 0.05 10*3/mm3 Final    nRBC 01/22/2024 0.0  0.0 - 0.2 /100 WBC Final    Lipase 01/22/2024 139 (H)  13 - 60 U/L Final    C-Reactive Protein 01/22/2024 5.29 (H)  0.00 - 0.50 mg/dL Final    RBC, UA 01/22/2024 3-5 (A)  None Seen, 0-2 /HPF Final    WBC, UA 01/22/2024 0-2  None Seen, 0-2 /HPF Final    Bacteria, UA 01/22/2024 None Seen  None Seen /HPF Final    Squamous Epithelial Cells, UA 01/22/2024 None Seen  None Seen, 0-2 /HPF Final    Hyaline Casts, UA 01/22/2024 None Seen  None Seen /LPF Final    Methodology 01/22/2024 Automated Microscopy   Final        Assessment:  1. Kidney stones.    Plan:  - The patient will be scheduled for a left right ureteroscopy, laser lithotripsy, possible right stent on Wednesday, 01/31/2024 with Dr. Crenshaw.      Smoking Cessation Counseling:  Former smoker.  Patient does not currently use any tobacco products.     Follow Up   Return in about 6 days (around 1/31/2024) for Next scheduled follow up, With Dr. Crenshaw IN OR FOR RIGHT URETEROSCOPY LASER LITHO WITH POSS STENT.    Patient was given instructions and counseling regarding his condition or for health maintenance advice. Please see specific information pulled into the AVS if appropriate.          This document has been electronically signed by Griselda Cheng-Akwa, APRN   January 25, 2024 14:09 EST      Dictated Utilizing Dragon Dictation: Part of this note may be an electronic transcription/translation of spoken language to printed text using the Dragon Dictation System.     Transcribed from ambient  dictation for Griselda Cheng-Akwa, APRN by Jojo Frye.  01/25/24   10:33 EST    Patient or patient representative verbalized consent to the visit recording.  I have personally performed the services described in this document as transcribed by the above individual, and it is both accurate and complete.

## 2024-01-25 NOTE — H&P (VIEW-ONLY)
Chief Complaint  RIGHT UPJ STONE/FLANK/BACK PAIN (ER FOLLOW UP kidney stone/abdominal pain)    Luis Sneed presents to [facility] for RIGHT UPJ STONE/FLANK PAIN  History of Present Illness    Mr. Austin Sneed is a pleasant 53-year-old male who presents to clinic today for evaluation for kidney stones. Patient is also an ER follow-up. States he presented to [facility] ED on 01/22/2024. This was secondary to 10 on 10 right greater than left  flank pain radiating to his right lower back, right mid abdomen causing significant pelvic pressure and suprapubic discomfort, and also associated with nausea and dry heaves. No actual emesis. Patient did deny any diarrhea or hematochezia. He had concerns about possible diverticulitis.     Nevertheless, secondary to his significant history of kidney stones, he had a CT abdomen and pelvis completed which I have reviewed showing 7.5 mm proximal mid right ureteral stone causing mild to moderate hydronephrosis and hydroureter. Patient's left kidney and ureter adenoma. Urinary bladder is also normal.     On initial presentation in clinic today, he is in no apparent discomfort. Still reports colicky, intermittent right flank and lower back pain 5/10, but reports not as bad compared to prior to ER follow-up. He is unable to give us any urine for analysis. The goal will be to schedule him for surgical intervention with Dr. Crenshaw.    The patient states he was AT HOME, and woke up on 01/21/2024 and could not move. He was sweating and clammy from severe right flank pain and discomfort requiring his ER evaluation. The patient states he is feeling better today. He rates his pain as a 5 out of 10. He declines a Toradol injection today.    The patient states he stopped taking all of his medications 3 months ago including his Plavix, antihypertensive, and blood sugar medications, even though he states he had a stent placed.   Patient reports this medication just made him feel bad all the time and he has felt significantly better after stopping everything    Asked about his A1c and the impact of diabetic Beatties on his entire health, the patient states he does not check his blood glucose. He states he feels better since he has not taken any of his medications. He states he drinks a lot of pomegranate juice.  Explained the importance of resuming medications especially Plavix, antihypertensive, and his blood sugar medicines, patient to follow-up with PCP for reevaluation     He has been scheduled for right ureteroscopy, laser lithotripsy, possible right stent on 01/31/2024 with Dr. Crenshaw for his right 7.5 mm UPJ stone    Patient is agreeable plan of care.    IMPRESSION CT 1/22/23:idneys, ureters, bladder: Mild right hydronephrosis due to an obstructing 7 mm calculus in the mid right ureter.  The left kidney and ureter are normal.  Urinary bladder is normal.   OBSTRUCTING 7 MM CALCULUS IN THE MID RIGHT URETER WITH MILD HYDRONEPHROSIS.     Active Ambulatory Problems     Diagnosis Date Noted    Precordial pain 07/25/2018    Dyspnea on exertion 07/25/2018    ASCVD (arteriosclerotic cardiovascular disease), status post stenting about 3 years ago in [facility].  07/25/2018    Chest pain 07/25/2018    Erectile dysfunction 10/26/2018    Closed nondisplaced fracture of lateral malleolus of right fibula 12/10/2019    Morbidly obese 12/12/2019    Type 2 diabetes mellitus without complication, without long-term current use of insulin 12/12/2019    Displaced fracture of lateral malleolus of right fibula, initial encounter for closed fracture 12/13/2019    Renal calculus, right 08/12/2020    Umbilical hernia without obstruction and without gangrene 09/09/2020    Gross hematuria 11/20/2020    Ureteral calculus 03/05/2021    Acute pain of left shoulder 05/26/2021    Osteoarthritis of left AC (acromioclavicular) joint 05/29/2021    Rotator cuff  "tendinitis, left 05/29/2021    Traumatic complete tear of left rotator cuff 12/09/2021    Biceps tendinitis of left upper extremity 12/09/2021    Impingement syndrome of left shoulder 12/09/2021    Bursitis of left shoulder 12/09/2021    Acute flank pain 04/13/2023    Essential hypertension 08/21/2023    Right nephrolithiasis 01/25/2024    Bilateral nephrolithiasis 01/25/2024    Right ureteral stone 01/25/2024    Bilateral flank pain 01/25/2024    BPH with obstruction/lower urinary tract symptoms 01/25/2024     Resolved Ambulatory Problems     Diagnosis Date Noted    No Resolved Ambulatory Problems     Past Medical History:   Diagnosis Date    Arthritis     Coronary artery disease     Diabetes mellitus     Elevated cholesterol     Fracture     GERD (gastroesophageal reflux disease)     Hyperlipidemia     Hypertension     Kidney stones     Sleep apnea       Objective   Vital Signs:   /96 (BP Location: Left arm, Patient Position: Sitting)   Pulse 103   Ht 170.2 cm (67.01\")   Wt 116 kg (255 lb)   BMI 39.93 kg/m²       ROS:   Review of Systems   Constitutional:  Positive for activity change, appetite change and unexpected weight gain. Negative for chills, diaphoresis, fatigue, fever and unexpected weight loss.   HENT: Negative.  Negative for congestion, ear discharge, ear pain, nosebleeds, rhinorrhea, sinus pressure and sore throat.    Eyes: Negative.  Negative for blurred vision, double vision, photophobia, pain, redness and visual disturbance.   Respiratory:  Negative for apnea, cough, chest tightness, shortness of breath, wheezing and stridor.    Cardiovascular:  Negative for chest pain, palpitations and leg swelling.   Gastrointestinal:  Positive for abdominal distention, abdominal pain and constipation. Negative for diarrhea, nausea and vomiting.   Endocrine: Negative for polydipsia, polyphagia and polyuria.   Genitourinary:  Positive for decreased urine volume, flank pain, frequency, hematuria and " nocturia. Negative for difficulty urinating, discharge, dysuria, genital sores, penile pain, penile swelling, scrotal swelling, testicular pain, urgency and urinary incontinence.   Musculoskeletal:  Negative for arthralgias, back pain and joint swelling.   Skin: Negative.  Positive for pallor. Negative for rash and wound.   Allergic/Immunologic: Negative.    Neurological:  Negative for dizziness, tremors, syncope, weakness, light-headedness, headache, memory problem and confusion.   Hematological: Negative.    Psychiatric/Behavioral: Negative.  Positive for sleep disturbance and stress. Negative for agitation, behavioral problems and decreased concentration.         Physical Exam  Constitutional:       General: He is in acute distress.      Appearance: He is well-developed. He is obese. He is ill-appearing.   HENT:      Head: Normocephalic and atraumatic.      Right Ear: External ear normal.      Left Ear: External ear normal.   Eyes:      General:         Right eye: No discharge.         Left eye: No discharge.      Conjunctiva/sclera: Conjunctivae normal.      Pupils: Pupils are equal, round, and reactive to light.   Neck:      Thyroid: No thyromegaly.      Trachea: No tracheal deviation.   Cardiovascular:      Rate and Rhythm: Normal rate and regular rhythm.      Heart sounds: No murmur heard.     No friction rub.   Pulmonary:      Effort: Pulmonary effort is normal. No respiratory distress.      Breath sounds: Normal breath sounds. No stridor.   Abdominal:      General: Bowel sounds are normal. There is distension.      Palpations: Abdomen is soft.      Tenderness: There is abdominal tenderness. There is no guarding.   Genitourinary:     Penis: Normal and uncircumcised. No tenderness or discharge.       Testes: Normal.      Rectum: Normal. Guaiac result negative.      Comments: URINE FREQUENCY/DYSURIA  Musculoskeletal:         General: Tenderness present. No deformity. Normal range of motion.      Cervical back:  Normal range of motion and neck supple.   Skin:     General: Skin is warm and dry.      Capillary Refill: Capillary refill takes less than 2 seconds.      Coloration: Skin is pale.   Neurological:      Mental Status: He is alert and oriented to person, place, and time.      Cranial Nerves: No cranial nerve deficit.      Motor: Weakness present.      Coordination: Coordination normal.   Psychiatric:         Behavior: Behavior normal.         Thought Content: Thought content normal.         Judgment: Judgment normal.        Result Review :     UA          4/13/2023    12:23 8/26/2023    00:35 1/22/2024    06:16   Urinalysis   Squamous Epithelial Cells, UA   None Seen    Specific Gravity, UA  >1.030  1.026    Ketones, UA Trace  Negative  Negative    Blood, UA  Negative  Trace    Leukocytes, UA Negative  Negative  Negative    Nitrite, UA  Negative  Negative    RBC, UA   3-5    WBC, UA   0-2    Bacteria, UA   None Seen      Urine Culture          4/13/2023    12:25   Urine Culture   Urine Culture No growth             Assessment and Plan    Problem List Items Addressed This Visit          Gastrointestinal Abdominal     Bilateral flank pain    Relevant Orders    Case Request (Completed)       Genitourinary and Reproductive     Right nephrolithiasis    Relevant Orders    Case Request (Completed)    Bilateral nephrolithiasis    Relevant Orders    Case Request (Completed)    Right ureteral stone - Primary    Relevant Orders    Case Request (Completed)    BPH with obstruction/lower urinary tract symptoms                                          ASSESSMENT  RIGHT PROXIMAL UPJ STONE/FLANK/ABD/BACK PAIN/BPH W. LUTS  Mr. Austin Sneed is a pleasant 53-year-old male who returns to clinic today for evaluation. Initially seen on 08/11/2023 for back pain. Patient has a significant history of recurrent kidney stones. When evaluated in clinic, he had right side greater than left side flank pain, lower abdominal pain, and discomfort that  had become very bothersome to him.  He deferred surgical intervention at that time.  He had denied any urinary episodes of frequency, urgency, or dysuria. He did haveBPH WITH LUTS, ALSO complaints of erectile dysfunction and was doing relatively well on sildenafil on as needed basis.    Right Proximal Ureteral Calculus/FLANK PAIN/DYSURIA:The Patient has been diagnosed with a 7.5MM right ureteral calculus.  He presents to clinic today with right flank pain that is colicky in nature, intermittent but become very bothersome to him.      We have discussed the various parameters regarding spontaneous passage including the notion that a tiny 1 to 4 mm stone, has a 90% high likelihood of spontaneous passage versus a larger stone of greater than 7.5 like he has being caught up in the upper areas of the urinary tract. We also discussed the medical management of stone disease and the use of medical expulsive therapy in the form of Flomax. This is used in an off label setting. I also talked about nonoperative management including ambulation and increasing fluids and hot tub as being an effective adjuncts in the treatment of a ureteral stone. We discussed the presence of the stone. We discussed the various therapeutic options available including percutaneous nephrostolithotomy, lithotripsy, ESWL.       ESWL-the patient is a candidate for extracorporeal shockwave lithotripsy.  We discussed the type of stone.  And the complications associated with the procedure including but not limited to pain in the flank, hematoma, spontaneous renal hemorrhage, and adequate fragmentation of stones.  The need for passage of the stones.  The need for concomitant additional procedures in the range of 24% the risk of a distal fragment in the range of 3% requiring ureteroscopic. We discussed the indicators for intervention including  absolute indicators such as sepsis and uncontrollable severe pain as well as  the relative indicators of moderate  pain that is well-controlled with various analgesia.                                                    PLAN  PATIENT HAS BEEN SCHEDULED FOR RIGHT URETEROSCOPY LASER LITHOTRIPSY WITH POSSIBLE RIGHT STENT ON WEDNESDAY 01/31/2024 BY DR. ESCOTO    Patient have adequate Narcotic pain medication hydrocodone 10 /325 given in ER for severe pain.    He has been given nonnarcotic pain medication and advised to continue with Toradol for breakthrough pain as needed.  Also nausea medicine, Flomax for medical expulsive therapy      We discussed OTHER  treatment options for HIS BACK/FLANK pain with patient encouraged to continue conservative therapy alternating NSAID/Tylenol as tolerated, Also including hot baths, showers, warm compresses to lower back as tolerated. Also encouraged walking, physical therapy, light exercises as tolerated     Rest is the most important treatment in the case of BACK/FLANK pain. Rest and physical therapy are enough to improve minor pain. Discussed to monitor his daily routine with prevention of flank pain by: At least Drinking 8 glasses of water per day, Limiting your alcohol consumption.  Having a healthy diet containing fruits, vegetables, and a lot of fluids, Practicing safe sex.  Also maintaining proper hygiene of your body as well as the environment.     Discussed a kidney stone prevention diet to include increasing p.o. fluid intake, to at least 1 to 2 L of water daily.  He is to avoid caffeine products such as cola, coffee, and to avoid soft or soda drinks.     He is to decrease her sodium consumption as in  Fast foods, torres, salted nuts, canned foods, and smoked/cured foods. He is also to decrease her oxalate consumption, as in spinach, Nery greens, and Rhubarb.  Also important is to decrease protein intake, as in red meats, peanut butter, and also avoid nuts.    We will follow-up in clinic POST PROCEDURE  for BPH with LUTS as discussed.     HE MAY RETURN SOONER IF NEED BE      PATIENT  IS AGREEABLE WITH PLAN OF CARE     Patient reports that he is not currently experiencing any symptoms of urinary incontinence.    RADIOLOGY (CT AND/OR KUB):    CT Abdomen and Pelvis: No results found for this or any previous visit.     CT Stone Protocol: Results for orders placed in visit on 01/24/23    CT Abdomen Pelvis Stone Protocol    Narrative  EXAM: CT ABDOMEN PELVIS STONE PROTOCOL-      TECHNIQUE: Multiple axial CT images were obtained from lung bases  through pubic symphysis WITHOUT administration of IV contrast.  Reformatted images in the coronal and/or sagittal plane(s) were  generated from the axial data set to facilitate diagnostic accuracy  and/or surgical planning.  Oral Contrast:NONE.    Radiation dose reduction techniques were utilized per ALARA protocol.  Automated exposure control was initiated through either or [employer] or  [employer] software packages by  protocol.  DOSE: 1194.69 mGy.cm    Clinical information Flank pain, kidney stone suspected    Comparison 12/31/2020    FINDINGS:    Lower thorax: Clear. No effusions.    Abdomen:    Liver: Homogeneous. No focal hepatic mass or ductal dilatation.    Gallbladder: No dilation or stone identified.    Pancreas: Unremarkable. No mass or ductal dilatation.    Spleen: Homogeneous. No splenomegaly.    Adrenals: No mass.    Kidneys/ureters: Nonobstructing intrarenal stones bilaterally    GI tract: Moderate stool burden. There is no evidence of appendicitis    MESENTERY: No free fluid, walled off fluid collections, mesenteric  stranding, or enlarged lymph nodes      Vasculature: No evidence of aneurysm.    Abdominal wall: Fat-containing umbilical hernia    Bladder: No focal mass or significant wall thickening    Reproductive: Unremarkable as visualized    Bones: Arthritic change. Disc space narrowing at L5-S1    Impression  1. Nonobstructing intrarenal stones bilaterally    2.Fat-containing ventral hernia    3. Sigmoid diverticuli without  diverticulitis at the time of the study    This report was finalized on 2/2/2023 1:56 PM by Dr. Gaston Dempsey MD.     KUB: Results for orders placed in visit on 04/13/23    XR abdomen kub    Narrative  EXAM:  XR Abdomen, 1 View    EXAM DATE:  4/13/2023 12:01 PM    CLINICAL HISTORY:  Kidney stone; R10.9-Unspecified abdominal pain    TECHNIQUE:  Frontal supine view of the abdomen/pelvis.    COMPARISON:  01/24/2023    FINDINGS:  Gastrointestinal tract:  Unremarkable.  No dilation.  Organs:  Right kidney stone is stable.  Calcification at the level of  S1 overlying the right iliac wing could represent a distal ureter stone  or may represent phlebolith. No change from previous.  Bones/joints:  Unremarkable.    Impression  1.  Right kidney stone is stable.  2.  Calcification at the level of S1 overlying the right iliac wing  could represent a distal ureter stone or may represent phlebolith. No  change from previous.    This report was finalized on 4/13/2023 1:05 PM by Dr. Logan Reyes MD.       LABS (3 MONTHS):    Admission on 01/22/2024, Discharged on 01/22/2024   Component Date Value Ref Range Status    Glucose 01/22/2024 227 (H)  65 - 99 mg/dL Final    BUN 01/22/2024 19  6 - 20 mg/dL Final    Creatinine 01/22/2024 1.53 (H)  0.76 - 1.27 mg/dL Final    Sodium 01/22/2024 136  136 - 145 mmol/L Final    Potassium 01/22/2024 4.3  3.5 - 5.2 mmol/L Final    Slight hemolysis detected by analyzer. Result may be falsely elevated.    Chloride 01/22/2024 106  98 - 107 mmol/L Final    CO2 01/22/2024 21.4 (L)  22.0 - 29.0 mmol/L Final    Calcium 01/22/2024 8.6  8.6 - 10.5 mg/dL Final    Total Protein 01/22/2024 6.8  6.0 - 8.5 g/dL Final    Albumin 01/22/2024 3.7  3.5 - 5.2 g/dL Final    ALT (SGPT) 01/22/2024 30  1 - 41 U/L Final    AST (SGOT) 01/22/2024 16  1 - 40 U/L Final    Alkaline Phosphatase 01/22/2024 55  39 - 117 U/L Final    Total Bilirubin 01/22/2024 0.8  0.0 - 1.2 mg/dL Final    Globulin 01/22/2024 3.1  gm/dL Final     A/G Ratio 01/22/2024 1.2  g/dL Final    BUN/Creatinine Ratio 01/22/2024 12.4  7.0 - 25.0 Final    Anion Gap 01/22/2024 8.6  5.0 - 15.0 mmol/L Final    eGFR 01/22/2024 53.4 (L)  >60.0 mL/min/1.73 Final    Color, UA 01/22/2024 Yellow  Yellow, Straw Final    Appearance, UA 01/22/2024 Clear  Clear Final    pH, UA 01/22/2024 5.5  5.0 - 8.0 Final    Specific Gravity, UA 01/22/2024 1.026  1.005 - 1.030 Final    Glucose, UA 01/22/2024 500 mg/dL (2+) (A)  Negative Final    Ketones, UA 01/22/2024 Negative  Negative Final    Bilirubin, UA 01/22/2024 Negative  Negative Final    Blood, UA 01/22/2024 Trace (A)  Negative Final    Protein, UA 01/22/2024 Trace (A)  Negative Final    Leuk Esterase, UA 01/22/2024 Negative  Negative Final    Nitrite, UA 01/22/2024 Negative  Negative Final    Urobilinogen, UA 01/22/2024 0.2 E.U./dL  0.2 - 1.0 E.U./dL Final    Extra Tube 01/22/2024 Hold for add-ons.   Final    Auto resulted.    Extra Tube 01/22/2024 hold for add-on   Final    Auto resulted    Extra Tube 01/22/2024 Hold for add-ons.   Final    Auto resulted.    Extra Tube 01/22/2024 Hold for add-ons.   Final    Auto resulted    WBC 01/22/2024 13.12 (H)  3.40 - 10.80 10*3/mm3 Final    RBC 01/22/2024 5.11  4.14 - 5.80 10*6/mm3 Final    Hemoglobin 01/22/2024 14.7  13.0 - 17.7 g/dL Final    Hematocrit 01/22/2024 45.0  37.5 - 51.0 % Final    MCV 01/22/2024 88.1  79.0 - 97.0 fL Final    MCH 01/22/2024 28.8  26.6 - 33.0 pg Final    MCHC 01/22/2024 32.7  31.5 - 35.7 g/dL Final    RDW 01/22/2024 13.2  12.3 - 15.4 % Final    RDW-SD 01/22/2024 42.8  37.0 - 54.0 fl Final    MPV 01/22/2024 9.5  6.0 - 12.0 fL Final    Platelets 01/22/2024 185  140 - 450 10*3/mm3 Final    Neutrophil % 01/22/2024 79.3 (H)  42.7 - 76.0 % Final    Lymphocyte % 01/22/2024 8.7 (L)  19.6 - 45.3 % Final    Monocyte % 01/22/2024 10.3  5.0 - 12.0 % Final    Eosinophil % 01/22/2024 0.7  0.3 - 6.2 % Final    Basophil % 01/22/2024 0.5  0.0 - 1.5 % Final    Immature Grans %  01/22/2024 0.5  0.0 - 0.5 % Final    Neutrophils, Absolute 01/22/2024 10.41 (H)  1.70 - 7.00 10*3/mm3 Final    Lymphocytes, Absolute 01/22/2024 1.14  0.70 - 3.10 10*3/mm3 Final    Monocytes, Absolute 01/22/2024 1.35 (H)  0.10 - 0.90 10*3/mm3 Final    Eosinophils, Absolute 01/22/2024 0.09  0.00 - 0.40 10*3/mm3 Final    Basophils, Absolute 01/22/2024 0.06  0.00 - 0.20 10*3/mm3 Final    Immature Grans, Absolute 01/22/2024 0.07 (H)  0.00 - 0.05 10*3/mm3 Final    nRBC 01/22/2024 0.0  0.0 - 0.2 /100 WBC Final    Lipase 01/22/2024 139 (H)  13 - 60 U/L Final    C-Reactive Protein 01/22/2024 5.29 (H)  0.00 - 0.50 mg/dL Final    RBC, UA 01/22/2024 3-5 (A)  None Seen, 0-2 /HPF Final    WBC, UA 01/22/2024 0-2  None Seen, 0-2 /HPF Final    Bacteria, UA 01/22/2024 None Seen  None Seen /HPF Final    Squamous Epithelial Cells, UA 01/22/2024 None Seen  None Seen, 0-2 /HPF Final    Hyaline Casts, UA 01/22/2024 None Seen  None Seen /LPF Final    Methodology 01/22/2024 Automated Microscopy   Final        Assessment:  1. Kidney stones.    Plan:  - The patient will be scheduled for a left right ureteroscopy, laser lithotripsy, possible right stent on Wednesday, 01/31/2024 with Dr. Crenshaw.      Smoking Cessation Counseling:  Former smoker.  Patient does not currently use any tobacco products.     Follow Up   Return in about 6 days (around 1/31/2024) for Next scheduled follow up, With Dr. Crenshaw IN OR FOR RIGHT URETEROSCOPY LASER LITHO WITH POSS STENT.    Patient was given instructions and counseling regarding his condition or for health maintenance advice. Please see specific information pulled into the AVS if appropriate.          This document has been electronically signed by Griselda Cheng-Akwa, APRN   January 25, 2024 14:09 EST      Dictated Utilizing Dragon Dictation: Part of this note may be an electronic transcription/translation of spoken language to printed text using the Dragon Dictation System.     Transcribed from ambient  dictation for Griselda Cheng-Akwa, APRN by Jojo Frye.  01/25/24   10:33 EST    Patient or patient representative verbalized consent to the visit recording.  I have personally performed the services described in this document as transcribed by the above individual, and it is both accurate and complete.

## 2024-01-31 ENCOUNTER — APPOINTMENT (OUTPATIENT)
Dept: GENERAL RADIOLOGY | Facility: HOSPITAL | Age: 54
End: 2024-01-31
Payer: COMMERCIAL

## 2024-01-31 ENCOUNTER — HOSPITAL ENCOUNTER (OUTPATIENT)
Facility: HOSPITAL | Age: 54
Setting detail: HOSPITAL OUTPATIENT SURGERY
Discharge: HOME | End: 2024-01-31
Attending: UROLOGY | Admitting: UROLOGY
Payer: COMMERCIAL

## 2024-01-31 ENCOUNTER — ANESTHESIA EVENT (OUTPATIENT)
Dept: PERIOP | Facility: HOSPITAL | Age: 54
End: 2024-01-31
Payer: COMMERCIAL

## 2024-01-31 ENCOUNTER — ANESTHESIA (OUTPATIENT)
Dept: PERIOP | Facility: HOSPITAL | Age: 54
End: 2024-01-31
Payer: COMMERCIAL

## 2024-01-31 VITALS
RESPIRATION RATE: 16 BRPM | HEART RATE: 82 BPM | WEIGHT: 252 LBS | OXYGEN SATURATION: 95 % | TEMPERATURE: 98 F | SYSTOLIC BLOOD PRESSURE: 130 MMHG | HEIGHT: 67 IN | DIASTOLIC BLOOD PRESSURE: 85 MMHG | BODY MASS INDEX: 39.55 KG/M2

## 2024-01-31 DIAGNOSIS — N20.0 RIGHT NEPHROLITHIASIS: ICD-10-CM

## 2024-01-31 DIAGNOSIS — N20.1 RIGHT URETERAL STONE: ICD-10-CM

## 2024-01-31 DIAGNOSIS — N20.0 BILATERAL NEPHROLITHIASIS: ICD-10-CM

## 2024-01-31 DIAGNOSIS — R10.9 BILATERAL FLANK PAIN: ICD-10-CM

## 2024-01-31 LAB — GLUCOSE BLDC GLUCOMTR-MCNC: 208 MG/DL (ref 70–130)

## 2024-01-31 PROCEDURE — 25010000002 PROPOFOL 200 MG/20ML EMULSION: Performed by: NURSE ANESTHETIST, CERTIFIED REGISTERED

## 2024-01-31 PROCEDURE — 76000 FLUOROSCOPY <1 HR PHYS/QHP: CPT

## 2024-01-31 PROCEDURE — 25510000001 IOPAMIDOL 61 % SOLUTION: Performed by: UROLOGY

## 2024-01-31 PROCEDURE — 25010000002 FENTANYL CITRATE (PF) 50 MCG/ML SOLUTION: Performed by: NURSE ANESTHETIST, CERTIFIED REGISTERED

## 2024-01-31 PROCEDURE — 82948 REAGENT STRIP/BLOOD GLUCOSE: CPT

## 2024-01-31 PROCEDURE — 25010000002 ONDANSETRON PER 1 MG: Performed by: NURSE ANESTHETIST, CERTIFIED REGISTERED

## 2024-01-31 PROCEDURE — 25010000002 GENTAMICIN PER 80 MG: Performed by: NURSE PRACTITIONER

## 2024-01-31 PROCEDURE — 52353 CYSTOURETERO W/LITHOTRIPSY: CPT | Performed by: UROLOGY

## 2024-01-31 PROCEDURE — 25810000003 LACTATED RINGERS PER 1000 ML: Performed by: ANESTHESIOLOGY

## 2024-01-31 RX ORDER — LIDOCAINE HYDROCHLORIDE 20 MG/ML
INJECTION, SOLUTION EPIDURAL; INFILTRATION; INTRACAUDAL; PERINEURAL AS NEEDED
Status: DISCONTINUED | OUTPATIENT
Start: 2024-01-31 | End: 2024-01-31 | Stop reason: SURG

## 2024-01-31 RX ORDER — SODIUM CHLORIDE 0.9 % (FLUSH) 0.9 %
10 SYRINGE (ML) INJECTION EVERY 12 HOURS SCHEDULED
Status: DISCONTINUED | OUTPATIENT
Start: 2024-01-31 | End: 2024-01-31 | Stop reason: HOSPADM

## 2024-01-31 RX ORDER — HYDROCODONE BITARTRATE AND ACETAMINOPHEN 10; 325 MG/1; MG/1
1 TABLET ORAL EVERY 6 HOURS PRN
Qty: 12 TABLET | Refills: 0 | Status: SHIPPED | OUTPATIENT
Start: 2024-01-31

## 2024-01-31 RX ORDER — SODIUM CHLORIDE 0.9 % (FLUSH) 0.9 %
10 SYRINGE (ML) INJECTION AS NEEDED
Status: DISCONTINUED | OUTPATIENT
Start: 2024-01-31 | End: 2024-01-31 | Stop reason: HOSPADM

## 2024-01-31 RX ORDER — ONDANSETRON 2 MG/ML
4 INJECTION INTRAMUSCULAR; INTRAVENOUS AS NEEDED
Status: DISCONTINUED | OUTPATIENT
Start: 2024-01-31 | End: 2024-01-31 | Stop reason: HOSPADM

## 2024-01-31 RX ORDER — FAMOTIDINE 10 MG/ML
INJECTION, SOLUTION INTRAVENOUS AS NEEDED
Status: DISCONTINUED | OUTPATIENT
Start: 2024-01-31 | End: 2024-01-31 | Stop reason: SURG

## 2024-01-31 RX ORDER — OXYCODONE HYDROCHLORIDE AND ACETAMINOPHEN 5; 325 MG/1; MG/1
1 TABLET ORAL ONCE AS NEEDED
Status: COMPLETED | OUTPATIENT
Start: 2024-01-31 | End: 2024-01-31

## 2024-01-31 RX ORDER — ONDANSETRON 2 MG/ML
INJECTION INTRAMUSCULAR; INTRAVENOUS AS NEEDED
Status: DISCONTINUED | OUTPATIENT
Start: 2024-01-31 | End: 2024-01-31 | Stop reason: SURG

## 2024-01-31 RX ORDER — GENTAMICIN SULFATE 80 MG/100ML
80 INJECTION, SOLUTION INTRAVENOUS ONCE
Status: COMPLETED | OUTPATIENT
Start: 2024-01-31 | End: 2024-01-31

## 2024-01-31 RX ORDER — MIDAZOLAM HYDROCHLORIDE 1 MG/ML
1 INJECTION INTRAMUSCULAR; INTRAVENOUS
Status: DISCONTINUED | OUTPATIENT
Start: 2024-01-31 | End: 2024-01-31 | Stop reason: HOSPADM

## 2024-01-31 RX ORDER — SODIUM CHLORIDE, SODIUM LACTATE, POTASSIUM CHLORIDE, CALCIUM CHLORIDE 600; 310; 30; 20 MG/100ML; MG/100ML; MG/100ML; MG/100ML
125 INJECTION, SOLUTION INTRAVENOUS ONCE
Status: COMPLETED | OUTPATIENT
Start: 2024-01-31 | End: 2024-01-31

## 2024-01-31 RX ORDER — MEPERIDINE HYDROCHLORIDE 25 MG/ML
12.5 INJECTION INTRAMUSCULAR; INTRAVENOUS; SUBCUTANEOUS
Status: DISCONTINUED | OUTPATIENT
Start: 2024-01-31 | End: 2024-01-31 | Stop reason: HOSPADM

## 2024-01-31 RX ORDER — SODIUM CHLORIDE 9 MG/ML
40 INJECTION, SOLUTION INTRAVENOUS AS NEEDED
Status: DISCONTINUED | OUTPATIENT
Start: 2024-01-31 | End: 2024-01-31 | Stop reason: HOSPADM

## 2024-01-31 RX ORDER — FENTANYL CITRATE 50 UG/ML
50 INJECTION, SOLUTION INTRAMUSCULAR; INTRAVENOUS
Status: DISCONTINUED | OUTPATIENT
Start: 2024-01-31 | End: 2024-01-31 | Stop reason: HOSPADM

## 2024-01-31 RX ORDER — MAGNESIUM HYDROXIDE 1200 MG/15ML
LIQUID ORAL AS NEEDED
Status: DISCONTINUED | OUTPATIENT
Start: 2024-01-31 | End: 2024-01-31 | Stop reason: HOSPADM

## 2024-01-31 RX ORDER — IPRATROPIUM BROMIDE AND ALBUTEROL SULFATE 2.5; .5 MG/3ML; MG/3ML
3 SOLUTION RESPIRATORY (INHALATION) ONCE AS NEEDED
Status: DISCONTINUED | OUTPATIENT
Start: 2024-01-31 | End: 2024-01-31 | Stop reason: HOSPADM

## 2024-01-31 RX ORDER — LIDOCAINE HYDROCHLORIDE 20 MG/ML
JELLY TOPICAL AS NEEDED
Status: DISCONTINUED | OUTPATIENT
Start: 2024-01-31 | End: 2024-01-31 | Stop reason: HOSPADM

## 2024-01-31 RX ORDER — SODIUM CHLORIDE, SODIUM LACTATE, POTASSIUM CHLORIDE, CALCIUM CHLORIDE 600; 310; 30; 20 MG/100ML; MG/100ML; MG/100ML; MG/100ML
100 INJECTION, SOLUTION INTRAVENOUS ONCE AS NEEDED
Status: DISCONTINUED | OUTPATIENT
Start: 2024-01-31 | End: 2024-01-31 | Stop reason: HOSPADM

## 2024-01-31 RX ORDER — KETOROLAC TROMETHAMINE 30 MG/ML
30 INJECTION, SOLUTION INTRAMUSCULAR; INTRAVENOUS EVERY 6 HOURS PRN
Status: DISCONTINUED | OUTPATIENT
Start: 2024-01-31 | End: 2024-01-31 | Stop reason: HOSPADM

## 2024-01-31 RX ORDER — FENTANYL CITRATE 50 UG/ML
INJECTION, SOLUTION INTRAMUSCULAR; INTRAVENOUS AS NEEDED
Status: DISCONTINUED | OUTPATIENT
Start: 2024-01-31 | End: 2024-01-31 | Stop reason: SURG

## 2024-01-31 RX ORDER — PROPOFOL 10 MG/ML
INJECTION, EMULSION INTRAVENOUS AS NEEDED
Status: DISCONTINUED | OUTPATIENT
Start: 2024-01-31 | End: 2024-01-31 | Stop reason: SURG

## 2024-01-31 RX ADMIN — FAMOTIDINE 20 MG: 10 INJECTION, SOLUTION INTRAVENOUS at 11:35

## 2024-01-31 RX ADMIN — FENTANYL CITRATE 50 MCG: 50 INJECTION, SOLUTION INTRAMUSCULAR; INTRAVENOUS at 12:21

## 2024-01-31 RX ADMIN — SODIUM CHLORIDE, POTASSIUM CHLORIDE, SODIUM LACTATE AND CALCIUM CHLORIDE: 600; 310; 30; 20 INJECTION, SOLUTION INTRAVENOUS at 11:35

## 2024-01-31 RX ADMIN — ONDANSETRON 4 MG: 2 INJECTION INTRAMUSCULAR; INTRAVENOUS at 11:44

## 2024-01-31 RX ADMIN — PROPOFOL 100 MG: 10 INJECTION, EMULSION INTRAVENOUS at 11:38

## 2024-01-31 RX ADMIN — FENTANYL CITRATE 100 MCG: 50 INJECTION INTRAMUSCULAR; INTRAVENOUS at 11:38

## 2024-01-31 RX ADMIN — FENTANYL CITRATE 50 MCG: 50 INJECTION, SOLUTION INTRAMUSCULAR; INTRAVENOUS at 12:44

## 2024-01-31 RX ADMIN — GENTAMICIN SULFATE 80 MG: 80 INJECTION, SOLUTION INTRAVENOUS at 11:35

## 2024-01-31 RX ADMIN — LIDOCAINE HYDROCHLORIDE 60 MG: 20 INJECTION, SOLUTION EPIDURAL; INFILTRATION; INTRACAUDAL; PERINEURAL at 11:38

## 2024-01-31 RX ADMIN — OXYCODONE AND ACETAMINOPHEN 1 TABLET: 5; 325 TABLET ORAL at 12:45

## 2024-01-31 NOTE — OP NOTE
Austin Sneed  1/31/2024    Pre-op Diagnosis:   Right nephrolithiasis [N20.0]  Bilateral nephrolithiasis [N20.0]  Right ureteral stone [N20.1]  Bilateral flank pain [R10.9]    Post-op Diagnosis:     Post-Op Diagnosis Codes:     * Right nephrolithiasis [N20.0]     * Bilateral nephrolithiasis [N20.0]     * Right ureteral stone [N20.1]     * Bilateral flank pain [R10.9]    Procedure/CPT® Codes:  53-year-old white male with a right mid ureteral stone causing significant pain following an informed consent brought the procedure suite prepped and draped in sterile fashion I anesthetized the urethra with 10 cc of 2% viscous Xylocaine jelly advanced the Luu 4.5 [place] ureteroscope up the right orifice identified normal bladder normal urethra and the stone lying in the distal third actually had used the basket to put down to I can lasered numerous tiny pieces extracted each piece sequentially careful retrograde showed no extravasation he tolerated it well I did not leave a stent    Procedure(s):  URETEROSCOPY   LASER LITHOTRIPSY  RETROGRADE PYELOGRAM   BASKET STONE EXTRACTION  Fluoroscopy less than 30 minutes    Surgeon(s):  Pablo Crenshaw MD    Anesthesia: see anesthesia record    Staff:   Circulator: Lillie Champagne RN  Scrub Person: Deana Garg LPN; Maggie Diaz    Estimated Blood Loss: none  Urine Voided: * No values recorded between 1/31/2024 11:35 AM and 1/31/2024 12:05 PM *    Specimens:                None      Drains: None    Findings: Calcium oxalate calculus    Blood: N/A    Complications: None    Grafts and Implants: None    Pablo Crenshaw MD     Date: 1/31/2024  Time: 12:06 EST

## 2024-01-31 NOTE — ANESTHESIA POSTPROCEDURE EVALUATION
Patient: Austin Sneed    Procedure Summary       Date: 01/31/24 Room / Location: [facility] OR 06 /  [facility] OR    Anesthesia Start: 1135 Anesthesia Stop: 1211    Procedure: URETEROSCOPY LASER LITHOTRIPSY WITH RETROGRADE PYELOGRAM AND BASKET STONE EXTRACTION (Right) Diagnosis:       Right nephrolithiasis      Bilateral nephrolithiasis      Right ureteral stone      Bilateral flank pain      (Right nephrolithiasis [N20.0])      (Bilateral nephrolithiasis [N20.0])      (Right ureteral stone [N20.1])      (Bilateral flank pain [R10.9])    Surgeons: Pablo Crenshaw MD Provider: Min Kay MD    Anesthesia Type: general ASA Status: 3            Anesthesia Type: general    Vitals  Vitals Value Taken Time   /83 01/31/24 1212   Temp 97.4 °F (36.3 °C) 01/31/24 1207   Pulse 120 01/31/24 1216   Resp 15 01/31/24 1212   SpO2 95 % 01/31/24 1216   Vitals shown include unfiled device data.        Post Anesthesia Care and Evaluation    Patient location during evaluation: PHASE II  Patient participation: complete - patient participated  Level of consciousness: awake and alert  Pain score: 1  Pain management: adequate    Airway patency: patent  Anesthetic complications: No anesthetic complications  PONV Status: controlled  Cardiovascular status: acceptable  Respiratory status: acceptable  Hydration status: acceptable

## 2024-02-26 ENCOUNTER — OFFICE VISIT (OUTPATIENT)
Dept: CARDIOLOGY | Facility: CLINIC | Age: 54
End: 2024-02-26
Payer: COMMERCIAL

## 2024-02-26 VITALS
HEIGHT: 67 IN | BODY MASS INDEX: 39.87 KG/M2 | DIASTOLIC BLOOD PRESSURE: 82 MMHG | OXYGEN SATURATION: 95 % | SYSTOLIC BLOOD PRESSURE: 123 MMHG | HEART RATE: 84 BPM | WEIGHT: 254 LBS

## 2024-02-26 DIAGNOSIS — I25.10 ASCVD (ARTERIOSCLEROTIC CARDIOVASCULAR DISEASE): ICD-10-CM

## 2024-02-26 DIAGNOSIS — I10 ESSENTIAL HYPERTENSION: Primary | ICD-10-CM

## 2024-02-26 PROCEDURE — 99214 OFFICE O/P EST MOD 30 MIN: CPT | Performed by: PHYSICIAN ASSISTANT

## 2024-02-26 PROCEDURE — 3079F DIAST BP 80-89 MM HG: CPT | Performed by: PHYSICIAN ASSISTANT

## 2024-02-26 PROCEDURE — 93000 ELECTROCARDIOGRAM COMPLETE: CPT | Performed by: PHYSICIAN ASSISTANT

## 2024-02-26 PROCEDURE — 1159F MED LIST DOCD IN RCRD: CPT | Performed by: PHYSICIAN ASSISTANT

## 2024-02-26 PROCEDURE — 3074F SYST BP LT 130 MM HG: CPT | Performed by: PHYSICIAN ASSISTANT

## 2024-02-26 PROCEDURE — 1160F RVW MEDS BY RX/DR IN RCRD: CPT | Performed by: PHYSICIAN ASSISTANT

## 2024-02-26 RX ORDER — CLOPIDOGREL BISULFATE 75 MG/1
75 TABLET ORAL DAILY
Qty: 90 TABLET | Refills: 2 | Status: SHIPPED | OUTPATIENT
Start: 2024-02-26

## 2024-02-26 RX ORDER — METOPROLOL SUCCINATE 50 MG/1
50 TABLET, EXTENDED RELEASE ORAL DAILY
Qty: 90 TABLET | Refills: 2 | Status: SHIPPED | OUTPATIENT
Start: 2024-02-26

## 2024-02-26 NOTE — PROGRESS NOTES
Provider, No Known  Austin Sneed  1970 02/26/2024    Patient Active Problem List   Diagnosis    Precordial pain    Dyspnea on exertion    ASCVD (arteriosclerotic cardiovascular disease), status post stenting about 3 years ago in [facility].     Chest pain    Erectile dysfunction    Closed nondisplaced fracture of lateral malleolus of right fibula    Morbidly obese    Type 2 diabetes mellitus without complication, without long-term current use of insulin    Displaced fracture of lateral malleolus of right fibula, initial encounter for closed fracture    Renal calculus, right    Umbilical hernia without obstruction and without gangrene    Gross hematuria    Ureteral calculus    Acute pain of left shoulder    Osteoarthritis of left AC (acromioclavicular) joint    Rotator cuff tendinitis, left    Traumatic complete tear of left rotator cuff    Biceps tendinitis of left upper extremity    Impingement syndrome of left shoulder    Bursitis of left shoulder    Acute flank pain    Essential hypertension    Right nephrolithiasis    Bilateral nephrolithiasis    Right ureteral stone    Bilateral flank pain    BPH with obstruction/lower urinary tract symptoms       Dear Provider, No Known:    Subjective     History of Present Illness:    Chief Complaint   Patient presents with    Follow-up     Routine       Austin Sneed is a pleasant 53 y.o. male with a past medical history significant for ASCVD status post stenting in [facility] in 2015 diabetes mellitus, dyslipidemia, essential hypertension. Comes in today for cardiology follow-up.    Austin has no complaints today with open questions.  Upon further questioning he does admit he is actually stopped nearly all of his medications with the exception of Plavix and metoprolol.  He is no longer taking Lipitor or lisinopril.  He also has stopped his diabetic medications.  He does report since stopping these medications he is feeling much better and has more energy and  able to form ADLs better.  He denies any chest pains, shortness of breath, dizziness, or syncope.    Allergies   Allergen Reactions    Penicillins Hives   :      Current Outpatient Medications:     clopidogrel (PLAVIX) 75 MG tablet, Take 1 tablet by mouth Daily., Disp: 90 tablet, Rfl: 2    metoprolol succinate XL (TOPROL-XL) 50 MG 24 hr tablet, Take 1 tablet by mouth Daily., Disp: 90 tablet, Rfl: 2    atorvastatin (LIPITOR) 20 MG tablet, Take 1 tablet by mouth Daily. (Patient not taking: Reported on 2/26/2024), Disp: , Rfl:     Blood Glucose Monitoring Suppl (TRUE METRIX METER) w/Device kit, Use as directed to test Blood Sugar. (Patient not taking: Reported on 2/26/2024), Disp: 1 kit, Rfl: 0    ciprofloxacin (CIPRO) 500 MG tablet, Take 1 tablet by mouth 2 (Two) Times a Day. (Patient not taking: Reported on 2/26/2024), Disp: 14 tablet, Rfl: 0    glucose blood test strip, Use as directed to test Blood Sugar 2 times a day. (Patient not taking: Reported on 2/26/2024), Disp: 50 each, Rfl: 0    HYDROcodone-acetaminophen (NORCO)  MG per tablet, Take 1 tablet by mouth Every 6 (Six) Hours As Needed for Moderate Pain. (Patient not taking: Reported on 2/26/2024), Disp: 12 tablet, Rfl: 0    HYDROcodone-acetaminophen (NORCO)  MG per tablet, Take 1 tablet by mouth Every 6 (Six) Hours As Needed for Moderate Pain. (Patient not taking: Reported on 2/26/2024), Disp: 12 tablet, Rfl: 0    ibuprofen (ADVIL,MOTRIN) 600 MG tablet, Take 1 tablet by mouth Every 6 (Six) Hours As Needed for Moderate Pain. (Patient not taking: Reported on 2/26/2024), Disp: 30 tablet, Rfl: 0    lisinopril (PRINIVIL,ZESTRIL) 2.5 MG tablet, , Disp: , Rfl:     metFORMIN ER (GLUCOPHAGE-XR) 500 MG 24 hr tablet, Take 2 tablets by mouth 2 (two) times a day. (Patient not taking: Reported on 2/26/2024), Disp: , Rfl:     ondansetron ODT (ZOFRAN-ODT) 4 MG disintegrating tablet, Place 1 tablet on the tongue Every 6 (Six) Hours As Needed for Nausea or Vomiting.  "(Patient not taking: Reported on 2024), Disp: 20 tablet, Rfl: 0    sildenafil (VIAGRA) 100 MG tablet, TAKE ONE TABLET BY MOUTH DAILY AS NEEDED APPROXIMATELY 1 HOUR BEFORE SEXUAL ACTIVITY (Patient not taking: Reported on 2024), Disp: , Rfl:     tamsulosin (FLOMAX) 0.4 MG capsule 24 hr capsule, Take 1 capsule by mouth Daily. (Patient not taking: Reported on 2024), Disp: 30 capsule, Rfl: 0    TRUEPLUS LANCETS 28G misc, Use as Directed to test Blood Sugar 2 times a day (Patient not taking: Reported on 2024), Disp: 50 each, Rfl: 0    The following portions of the patient's history were reviewed and updated as appropriate: allergies, current medications, past family history, past medical history, past social history, past surgical history and problem list.    Social History     Tobacco Use    Smoking status: Former     Packs/day: 0.50     Years: 10.00     Additional pack years: 0.00     Total pack years: 5.00     Types: Cigarettes     Quit date:      Years since quittin.1     Passive exposure: Past    Smokeless tobacco: Never   Vaping Use    Vaping Use: Never used   Substance Use Topics    Alcohol use: Yes     Comment: social    Drug use: Never         Objective   Vitals:    24 0817   BP: 123/82   BP Location: Left arm   Patient Position: Sitting   Cuff Size: Adult   Pulse: 84   SpO2: 95%   Weight: 115 kg (254 lb)   Height: 170.2 cm (67\")     Body mass index is 39.78 kg/m².    ROS    Constitutional:       General: Not in acute distress.     Appearance: Healthy appearance. Well-developed and not in distress. Not diaphoretic.   Eyes:      Conjunctiva/sclera: Conjunctivae normal.      Pupils: Pupils are equal, round, and reactive to light.   HENT:      Head: Normocephalic and atraumatic.   Neck:      Vascular: No carotid bruit or JVD.   Pulmonary:      Effort: Pulmonary effort is normal. No respiratory distress.      Breath sounds: Normal breath sounds.   Cardiovascular:      Normal rate. " Regular rhythm.   Edema:     Peripheral edema absent.   Skin:     General: Skin is cool.   Neurological:      Mental Status: Alert, oriented to person, place, and time and oriented to person, place and time.         Lab Results   Component Value Date     01/22/2024    K 4.3 01/22/2024     01/22/2024    CO2 21.4 (L) 01/22/2024    BUN 19 01/22/2024    CREATININE 1.53 (H) 01/22/2024    GLUCOSE 227 (H) 01/22/2024    CALCIUM 8.6 01/22/2024    AST 16 01/22/2024    ALT 30 01/22/2024    ALKPHOS 55 01/22/2024     Lab Results   Component Value Date    CKTOTAL 90 08/26/2023     Lab Results   Component Value Date    WBC 13.12 (H) 01/22/2024    HGB 14.7 01/22/2024    HCT 45.0 01/22/2024     01/22/2024     Lab Results   Component Value Date    INR 0.86 (L) 01/16/2022     Lab Results   Component Value Date    MG 1.8 08/26/2023     Lab Results   Component Value Date    TSH 4.170 08/10/2020    PSA 1.3 01/24/2023    TRIG 97 07/20/2019    HDL 35 (L) 07/20/2019    LDL 43 07/20/2019      Lab Results   Component Value Date    BNP <2.0 07/25/2018       During this visit the following were done:  Labs Reviewed []    Labs Ordered []    Radiology Reports Reviewed []    Radiology Ordered []    PCP Records Reviewed []    Referring Provider Records Reviewed []    ER Records Reviewed []    Hospital Records Reviewed []    History Obtained From Family []    Radiology Images Reviewed []    Other Reviewed []    Records Requested []         ECG 12 Lead    Date/Time: 2/26/2024 8:26 AM  Performed by: Lloyd Escobar PA-C    Authorized by: Lloyd Escobar PA-C  Comparison: compared with previous ECG   Similar to previous ECG  Rhythm: sinus rhythm  Conduction: conduction normal  ST Segments: ST segments normal    Clinical impression: normal ECG          Assessment & Plan    Diagnosis Plan   1. Essential hypertension  ECG 12 Lead    Lipid Panel    Comprehensive Metabolic Panel      2. ASCVD (arteriosclerotic cardiovascular disease),  status post stenting about 3 years ago in [facility].   ECG 12 Lead    Lipid Panel    Comprehensive Metabolic Panel               Recommendations:  ASCVD  He did stop both Lipitor and lisinopril although he is continuing Plavix and metoprolol for now.  He is open to considering restarting statin therapy however I will order lipid panel and see what his cholesterol is before deciding on statin intensity since he is reluctant to be on further medications.  Will also check CMP.    Return in about 6 months (around 8/26/2024).    As always, I appreciate very much the opportunity to participate in the cardiovascular care of your patients.      With Best Regards,    Lloyd Escobar PA-C

## 2024-12-03 RX ORDER — CLOPIDOGREL BISULFATE 75 MG/1
75 TABLET ORAL DAILY
Qty: 90 TABLET | Refills: 2 | Status: SHIPPED | OUTPATIENT
Start: 2024-12-03

## 2025-02-24 ENCOUNTER — OFFICE VISIT (OUTPATIENT)
Dept: CARDIOLOGY | Facility: CLINIC | Age: 55
End: 2025-02-24
Payer: COMMERCIAL

## 2025-02-24 VITALS
BODY MASS INDEX: 37.67 KG/M2 | OXYGEN SATURATION: 96 % | DIASTOLIC BLOOD PRESSURE: 83 MMHG | WEIGHT: 240 LBS | HEIGHT: 67 IN | SYSTOLIC BLOOD PRESSURE: 118 MMHG | HEART RATE: 92 BPM

## 2025-02-24 DIAGNOSIS — I25.10 ASCVD (ARTERIOSCLEROTIC CARDIOVASCULAR DISEASE): Primary | ICD-10-CM

## 2025-02-24 DIAGNOSIS — I10 ESSENTIAL HYPERTENSION: ICD-10-CM

## 2025-02-24 PROCEDURE — 99214 OFFICE O/P EST MOD 30 MIN: CPT | Performed by: PHYSICIAN ASSISTANT

## 2025-02-24 PROCEDURE — 3079F DIAST BP 80-89 MM HG: CPT | Performed by: PHYSICIAN ASSISTANT

## 2025-02-24 PROCEDURE — 93000 ELECTROCARDIOGRAM COMPLETE: CPT | Performed by: PHYSICIAN ASSISTANT

## 2025-02-24 PROCEDURE — 3074F SYST BP LT 130 MM HG: CPT | Performed by: PHYSICIAN ASSISTANT

## 2025-02-24 RX ORDER — CLOPIDOGREL BISULFATE 75 MG/1
75 TABLET ORAL DAILY
Qty: 90 TABLET | Refills: 2 | Status: SHIPPED | OUTPATIENT
Start: 2025-02-24

## 2025-02-24 NOTE — PROGRESS NOTES
Ann Ross, PA-C  Austin Sneed  1970 02/24/2025    Patient Active Problem List   Diagnosis    Precordial pain    Dyspnea on exertion    ASCVD (arteriosclerotic cardiovascular disease), status post stenting about 3 years ago in [facility].     Chest pain    Erectile dysfunction    Closed nondisplaced fracture of lateral malleolus of right fibula    Morbidly obese    Type 2 diabetes mellitus without complication, without long-term current use of insulin    Displaced fracture of lateral malleolus of right fibula, initial encounter for closed fracture    Renal calculus, right    Umbilical hernia without obstruction and without gangrene    Gross hematuria    Ureteral calculus    Acute pain of left shoulder    Osteoarthritis of left AC (acromioclavicular) joint    Rotator cuff tendinitis, left    Traumatic complete tear of left rotator cuff    Biceps tendinitis of left upper extremity    Impingement syndrome of left shoulder    Bursitis of left shoulder    Acute flank pain    Essential hypertension    Right nephrolithiasis    Bilateral nephrolithiasis    Right ureteral stone    Bilateral flank pain    BPH with obstruction/lower urinary tract symptoms       Dear Ann Ross, PAMikeC:    Subjective     History of Present Illness:    Chief Complaint   Patient presents with    Follow-up     NEEDS STRESS FOR WORK    HAS NOT TAKEN ANY MEDS IN 3-4 MONTHS       Austin Sneed is a pleasant 54 y.o. male with a past medical history significant for ASCVD status post stenting in [facility] in 2015 diabetes mellitus, dyslipidemia, essential hypertension. Comes in today for cardiology follow-up.     Austin reports that he has stopped all medications entirely he reports since doing this he feels much better previously reporting joint and muscle pains and fatigue all of this has since resolved.  He also reports he has had lifestyle change and is now on a carnivore diet.  He denies any cardiac symptoms today such  as chest pains, shortness of breath, dizziness, or syncope.      Allergies   Allergen Reactions    Penicillins Hives   :      Current Outpatient Medications:     clopidogrel (PLAVIX) 75 MG tablet, Take 1 tablet by mouth Daily., Disp: 90 tablet, Rfl: 2    Blood Glucose Monitoring Suppl (TRUE METRIX METER) w/Device kit, Use as directed to test Blood Sugar. (Patient not taking: Reported on 2/24/2025), Disp: 1 kit, Rfl: 0    glucose blood test strip, Use as directed to test Blood Sugar 2 times a day. (Patient not taking: Reported on 2/24/2025), Disp: 50 each, Rfl: 0    HYDROcodone-acetaminophen (NORCO)  MG per tablet, Take 1 tablet by mouth Every 6 (Six) Hours As Needed for Moderate Pain. (Patient not taking: Reported on 2/24/2025), Disp: 12 tablet, Rfl: 0    HYDROcodone-acetaminophen (NORCO)  MG per tablet, Take 1 tablet by mouth Every 6 (Six) Hours As Needed for Moderate Pain. (Patient not taking: Reported on 2/24/2025), Disp: 12 tablet, Rfl: 0    ibuprofen (ADVIL,MOTRIN) 600 MG tablet, Take 1 tablet by mouth Every 6 (Six) Hours As Needed for Moderate Pain. (Patient not taking: Reported on 2/24/2025), Disp: 30 tablet, Rfl: 0    metFORMIN ER (GLUCOPHAGE-XR) 500 MG 24 hr tablet, Take 2 tablets by mouth 2 (two) times a day. (Patient not taking: Reported on 2/26/2024), Disp: , Rfl:     ondansetron ODT (ZOFRAN-ODT) 4 MG disintegrating tablet, Place 1 tablet on the tongue Every 6 (Six) Hours As Needed for Nausea or Vomiting. (Patient not taking: Reported on 2/24/2025), Disp: 20 tablet, Rfl: 0    sildenafil (VIAGRA) 100 MG tablet, TAKE ONE TABLET BY MOUTH DAILY AS NEEDED APPROXIMATELY 1 HOUR BEFORE SEXUAL ACTIVITY (Patient not taking: Reported on 2/24/2025), Disp: , Rfl:     tamsulosin (FLOMAX) 0.4 MG capsule 24 hr capsule, Take 1 capsule by mouth Daily. (Patient not taking: Reported on 2/24/2025), Disp: 30 capsule, Rfl: 0    TRUEPLUS LANCETS 28G misc, Use as Directed to test Blood Sugar 2 times a day (Patient  "not taking: Reported on 2/24/2025), Disp: 50 each, Rfl: 0    The following portions of the patient's history were reviewed and updated as appropriate: allergies, current medications, past family history, past medical history, past social history, past surgical history and problem list.    Social History     Tobacco Use    Smoking status: Former     Current packs/day: 0.00     Average packs/day: 0.5 packs/day for 10.0 years (5.0 ttl pk-yrs)     Types: Cigarettes     Start date: 2005     Quit date: 2015     Years since quitting: 10.1     Passive exposure: Past    Smokeless tobacco: Never   Vaping Use    Vaping status: Never Used   Substance Use Topics    Alcohol use: Yes     Comment: social    Drug use: Never         Objective   Vitals:    02/24/25 0951   BP: 118/83   Pulse: 92   SpO2: 96%   Weight: 109 kg (240 lb)   Height: 170.2 cm (67\")     Body mass index is 37.59 kg/m².    ROS    Constitutional:       General: Not in acute distress.     Appearance: Healthy appearance. Well-developed and not in distress. Not diaphoretic.   Eyes:      Conjunctiva/sclera: Conjunctivae normal.      Pupils: Pupils are equal, round, and reactive to light.   HENT:      Head: Normocephalic and atraumatic.   Neck:      Vascular: No carotid bruit or JVD.   Pulmonary:      Effort: Pulmonary effort is normal. No respiratory distress.      Breath sounds: Normal breath sounds.   Cardiovascular:      Normal rate. Regular rhythm.   Edema:     Peripheral edema absent.   Skin:     General: Skin is cool.   Neurological:      Mental Status: Alert, oriented to person, place, and time and oriented to person, place and time.         Lab Results   Component Value Date     01/22/2024    K 4.3 01/22/2024     01/22/2024    CO2 21.4 (L) 01/22/2024    BUN 19 01/22/2024    CREATININE 1.53 (H) 01/22/2024    GLUCOSE 227 (H) 01/22/2024    CALCIUM 8.6 01/22/2024    AST 16 01/22/2024    ALT 30 01/22/2024    ALKPHOS 55 01/22/2024     Lab Results "   Component Value Date    CKTOTAL 90 08/26/2023     Lab Results   Component Value Date    WBC 6.4 02/21/2025    HGB 17 02/21/2025    HCT 52.1 (H) 02/21/2025     02/21/2025     Lab Results   Component Value Date    INR 0.86 (L) 01/16/2022     Lab Results   Component Value Date    MG 1.8 08/26/2023     Lab Results   Component Value Date    TSH 4.170 08/10/2020    PSA 1.3 01/24/2023    TRIG 97 07/20/2019    HDL 35 (L) 07/20/2019    LDL 43 07/20/2019      Lab Results   Component Value Date    BNP <2.0 07/25/2018       During this visit the following were done:  Labs Reviewed []    Labs Ordered []    Radiology Reports Reviewed []    Radiology Ordered []    PCP Records Reviewed []    Referring Provider Records Reviewed []    ER Records Reviewed []    Hospital Records Reviewed []    History Obtained From Family []    Radiology Images Reviewed []    Other Reviewed []    Records Requested []         ECG 12 Lead    Date/Time: 2/24/2025 10:16 AM  Performed by: Lloyd Escobar PA-C    Authorized by: Lloyd Escobar PA-C  Comparison: compared with previous ECG   Similar to previous ECG  Rhythm: sinus rhythm  Conduction: conduction normal    Clinical impression: normal ECG          Assessment & Plan    Diagnosis Plan   1. ASCVD (arteriosclerotic cardiovascular disease), status post stenting about 3 years ago in [facility].   Ambulatory Referral to Disease State Management    ECG 12 Lead    Treadmill Stress Test      2. Essential hypertension                 Recommendations:  CAD  I did recommend reinitiation of his GDMT today.  He was agreeable on starting Plavix 75 mg  Reluctant to do any statins due to muscle aches or joints.  However he is agreeable on trying Repatha if approved by insurance  I would like him to be back on metoprolol and lisinopril for GDMT however given his reluctance to be on any further medications we will hold off at the time  Essential hypertension  Currently  well-controlled  Dyslipidemia  Will try to get him on Repatha  DOT physical  He requires treadmill stress to get DOT we will place order for this        No follow-ups on file.    As always, I appreciate very much the opportunity to participate in the cardiovascular care of your patients.      With Best Regards,    Lloyd Escobar PA-C

## 2025-03-03 ENCOUNTER — TELEPHONE (OUTPATIENT)
Dept: CARDIOLOGY | Facility: CLINIC | Age: 55
End: 2025-03-03
Payer: COMMERCIAL

## 2025-03-03 NOTE — TELEPHONE ENCOUNTER
Caller: Alisa Sneed    Relationship: Emergency Contact    Best call back number: [phone]     What is the best time to reach you: DURING THE DAY     What was the call regarding: WONDERING WHERE THE STRESS ON 3/10 WILL BE, NEEDS ADDRESS. PLEASE ADVISE. NOT SEEN ON MY SIDE.     Is it okay if the provider responds through MyChart: CALL

## 2025-03-07 ENCOUNTER — DISEASE STATE MANAGEMENT VISIT (OUTPATIENT)
Dept: PHARMACY | Facility: HOSPITAL | Age: 55
End: 2025-03-07
Payer: COMMERCIAL

## 2025-03-07 ENCOUNTER — SPECIALTY PHARMACY (OUTPATIENT)
Dept: PHARMACY | Facility: HOSPITAL | Age: 55
End: 2025-03-07
Payer: COMMERCIAL

## 2025-03-07 DIAGNOSIS — I25.10 ASCVD (ARTERIOSCLEROTIC CARDIOVASCULAR DISEASE): Primary | ICD-10-CM

## 2025-03-07 NOTE — PROGRESS NOTES
[facility] Patient Management Program  Lipid Management Program - PCSK9i Initial Assessment     Austin Sneed is a 54 y.o. male referred by their provider, Lloyd Escobar, to the Hyperlipidemia Patient Management program offered by [facility] for Lipid Management.  An initial outreach was conducted, including assessment of therapy appropriateness and specialty medication education for Repatha . The patient was introduced to services offered by [facility], including: regular assessments, refill coordination, curbside pick-up or mail order delivery options, prior authorization maintenance, and financial assistance programs as applicable. The patient was also provided with contact information for the pharmacy team.     Austin Sneed is  treated for clinical ASCVD and currently does not take anything for cholesterol.  In the past, Pt has tried Lipitor is statin intolerant due to myalgias. The patient denies any allergies to latex.        Insurance Coverage & Financial Support  Prescription     Relevant Past Medical History and Comorbidities  Relevant medical history and concomitant health conditions were discussed with the patient. The patient's chart has been reviewed for relevant past medical history and comorbid conditions and updated as necessary.  Past Medical History:   Diagnosis Date    Arthritis     Coronary artery disease     Diabetes mellitus     Elevated cholesterol     Fracture     GERD (gastroesophageal reflux disease)     Hyperlipidemia     Hypertension     Kidney stones     Sleep apnea     no c-pap     Social History     Socioeconomic History    Marital status:    Tobacco Use    Smoking status: Former     Current packs/day: 0.00     Average packs/day: 0.5 packs/day for 10.0 years (5.0 ttl pk-yrs)     Types: Cigarettes     Start date: 2005     Quit date: 2015     Years since quitting:  10.1     Passive exposure: Past    Smokeless tobacco: Never   Vaping Use    Vaping status: Never Used   Substance and Sexual Activity    Alcohol use: Yes     Comment: social    Drug use: Never    Sexual activity: Defer            Allergies  Known allergies and reactions were discussed with the patient. The patient's chart has been reviewed for  allergy information and updated as necessary.   Allergies   Allergen Reactions    Penicillins Hives            Relevant Laboratory Values  Relevant laboratory values were discussed with the patient. The following specialty medication dose adjustment(s) are recommended: See plan, if applicable   Lab Results   Component Value Date    CHOL 97 07/20/2019    TRIG 97 07/20/2019    HDL 35 (L) 07/20/2019    LDL 43 07/20/2019 2/21/25        Current Medication List  This medication list has been reviewed with the patient and evaluated for any interactions or necessary modifications/recommendations, and updated to include all prescription medications, OTC medications, and supplements the patient is currently taking.  This list reflects what is contained in the patient's profile, which has also been marked as reviewed to communicate to other providers it is the most up to date version of the patient's current medication therapy.     Current Outpatient Medications:     Blood Glucose Monitoring Suppl (TRUE METRIX METER) w/Device kit, Use as directed to test Blood Sugar. (Patient not taking: Reported on 2/24/2025), Disp: 1 kit, Rfl: 0    clopidogrel (PLAVIX) 75 MG tablet, Take 1 tablet by mouth Daily. (Patient not taking: Reported on 3/7/2025), Disp: 90 tablet, Rfl: 2    Evolocumab (REPATHA) solution auto-injector SureClick injection, Inject 1 mL under the skin into the appropriate area as directed Every 14 (Fourteen) Days., Disp: 6 mL, Rfl: 2    glucose blood test strip, Use as directed to test Blood Sugar 2 times a day., Disp: 50 each, Rfl: 0    HYDROcodone-acetaminophen (NORCO)   MG per tablet, Take 1 tablet by mouth Every 6 (Six) Hours As Needed for Moderate Pain. (Patient not taking: Reported on 2/26/2024), Disp: 12 tablet, Rfl: 0    HYDROcodone-acetaminophen (NORCO)  MG per tablet, Take 1 tablet by mouth Every 6 (Six) Hours As Needed for Moderate Pain. (Patient not taking: Reported on 2/26/2024), Disp: 12 tablet, Rfl: 0    ibuprofen (ADVIL,MOTRIN) 600 MG tablet, Take 1 tablet by mouth Every 6 (Six) Hours As Needed for Moderate Pain. (Patient not taking: Reported on 2/26/2024), Disp: 30 tablet, Rfl: 0    metFORMIN ER (GLUCOPHAGE-XR) 500 MG 24 hr tablet, Take 2 tablets by mouth 2 (two) times a day. (Patient not taking: Reported on 2/26/2024), Disp: , Rfl:     ondansetron ODT (ZOFRAN-ODT) 4 MG disintegrating tablet, Place 1 tablet on the tongue Every 6 (Six) Hours As Needed for Nausea or Vomiting. (Patient not taking: Reported on 2/26/2024), Disp: 20 tablet, Rfl: 0    sildenafil (VIAGRA) 100 MG tablet, TAKE ONE TABLET BY MOUTH DAILY AS NEEDED APPROXIMATELY 1 HOUR BEFORE SEXUAL ACTIVITY (Patient not taking: Reported on 2/26/2024), Disp: , Rfl:     tamsulosin (FLOMAX) 0.4 MG capsule 24 hr capsule, Take 1 capsule by mouth Daily. (Patient not taking: Reported on 2/26/2024), Disp: 30 capsule, Rfl: 0    TRUEPLUS LANCETS 28G misc, Use as Directed to test Blood Sugar 2 times a day (Patient not taking: Reported on 2/26/2024), Disp: 50 each, Rfl: 0         Patient reports today that he is currently not taking ANY medications.     Drug Interactions  No significant drug-drug interactions expected with Repatha according to literature     Adherence and Self-Administration  Adherence related to the patient's specialty therapy was discussed with the patient. The Adherence segment of this outreach has been reviewed and updated.              Methods for Supporting Patient Adherence and/or Self-Administration: see plan, if applicable     Open Medication Therapy Problems  No medication therapy  recommendations to display    Goals of Therapy  Goals related to the patient's specialty therapy were discussed with the patient. The Patient Goals segment of this outreach has been reviewed and updated.   Goals Addressed Today    None         Medication Assessment & Plan  Medication Therapy Changes: Patient started today on Repatha 140 mg every 2 weeks.   Injection training and medication education provided.   Pt administered into the RIGHT side of the stomach   Welcome information and patient satisfaction survey to be sent by specialty pharmacy team with patient's initial fill.  Related Plans, Therapy Recommendations, or Therapy Problems to Be Addressed: Will make sure provider is aware patient is not taking any medications currently, including Plavix.   Patient will need lipid panel in 6 weeks, order placed.   Patient will continue regular follow-up with cardiology.   Patient will follow up with specialty pharmacy mail-out for next injection. Care Coordinator to set up future refill outreaches, coordinate prescription delivery, and escalate clinical questions to pharmacist.  Pharmacist to perform regular assessments no more than (6) months from the previous assessment. Will follow-up in 6 months, or sooner if needed.    Initial Education Provided for Specialty Medication  The patient has been provided with the following education and any applicable administration techniques (i.e. self-injection) have been demonstrated for the therapies indicated. All questions and concerns have been addressed prior to the patient receiving the medication, and the patient has verbalized comprehension of the education and any materials provided. Additional patient education shall be provided and documented upon request by the patient, provider, or payer.    REPATHA® (evolocumab)  Medication Expectations   Why am I taking this medication? You are taking Repatha to lower your “bad” cholesterol (LDL-C). This medication can be used in  adults with high blood cholesterol including primary hyperlipidemia and familial hypercholesterolemia.    What should I expect while on this medication? You should expect to see your cholesterol improve over time. Specifically, you should see your LDL-C decrease.    How does the medication work? Repatha works by blocking a protein called PCSK9 that contributes to high levels of bad cholesterol. It helps increase your liver's ability to remove bad cholesterol from your blood.     How long will I be on this medication for? The amount of time you will be on this medication will be determined by your doctor based on your cholesterol and/or your risk of having a cardiac event. You will most likely be on this medication or another cholesterol medication throughout your lifetime. Do not abruptly stop this medication without talking to your doctor first.    How do I take this medication? Take as directed on your prescription label. Repatha is injected under the skin (subcutaneously) of your stomach, thigh, or upper arm. This medication is usually given one or twice a month.   What are some possible side effects? The most common side effects of Repatha include redness, itching, swelling, or pain/tenderness at the injection site, symptoms of the common cold, flu or flu-like symptoms or back pain.    What happens if I miss a dose? If you miss a dose, take it as soon as you remember if it is within 7 days from the usual day of administration then resume your original schedule. If it is beyond 7 days and you use the kareen-2-week dose, skip the missed dose and resume your normal dosing schedule.If it is beyond 7 days and you use the once-monthly dose, inject the dose and start a new schedule based on that date.      Medication Safety   What are things I should warn my doctor immediately about? Talk to your doctor if you are pregnant, planning to become pregnant, or breastfeeding. Stop the medication and tell your doctor or seek  emergency medical help if you notice any signs/symptoms of an allergic reaction (severe rash, redness, hives, severe itching, trouble breathing, or swelling of the face, lips, or tongue). If you have a rubber or latex allergy, you should not use the Repatha SureClick® Autoinjector pen or the prefilled syringe, please notify your doctor or pharmacist.   What are things that I should be cautious of? Be cautious of any side effects from this medication. Talk to your doctor if any new ones develop or aren't getting better.   What are some medications that can interact with this one? There are no known significant drug interactions with Repatha. Always tell your doctor or pharmacist immediately if you start taking any new medications, including over-the-counter medications, vitamins, and herbal supplements.      Medication Storage/Handling   How should I handle this medication? Do not shake or expose the pens, cartridges, or syringes to extreme heat or direct sunlight. Keep this medication out of reach of pets/children. Allow medication to warm at room temperature prior to administration.   How does this medication need to be stored? Store unused pens, cartridges, or syringes in the refrigerator in the original cartons to protect from light. If needed, Repatha may be kept at room temperature in the original carton for up to 30 days. Do not freeze.    How should I dispose of this medication? All the Repatha devices are single-dose and should be discarded in a sharps container after use. If your doctor decides to stop this medication, take to your local police station for proper disposal. Some pharmacies also have take-back bins for medication drop-off.      Resources/Support   How can I remind myself to take this medication? You can download reminder apps to help you manage your refills. You may also set an alarm on your phone to remind you to take your dose.    Is financial support available?  [employer] can provide co-pay  cards if you have commercial insurance or patient assistance if you have Medicare or no insurance.    Which vaccines are recommended for me? Talk to your doctor about these vaccines: Flu, Coronavirus (COVID-19), Pneumococcal (pneumonia), Tdap, Hepatitis B, Zoster (shingles)         Attestation      I attest the patient was actively involved in and has agreed to the above plan of care. If the prescribed therapy is at any point deemed not appropriate based on the current or future assessments, a consultation will be initiated with the patient's specialty care provider to determine the best course of action. The revised plan of therapy will be documented along with any required assessments and/or additional patient education provided.     Lillie Mendoza. Rebekah, PharmD  3/7/2025  08:52 EST

## 2025-03-07 NOTE — PROGRESS NOTES
Medication Management Clinic/ Specialty Pharmacy Patient Management Program  Lipid Management Program - PCSK9i Initial Assessment     Austin Sneed is a 54 y.o. male referred by their provider, Lloyd Escobar, to the Hyperlipidemia Patient Management program offered by [facility] for Lipid Management.  An initial outreach was conducted, including assessment of therapy appropriateness and specialty medication education for Repatha . The patient was introduced to services offered by [facility], including: regular assessments, refill coordination, curbside pick-up or mail order delivery options, prior authorization maintenance, and financial assistance programs as applicable. The patient was also provided with contact information for the pharmacy team.     Austin Sneed is  treated for clinical ASCVD and currently does not take anything for cholesterol.  In the past, Pt has tried Lipitor is statin intolerant due to myalgias. The patient denies any allergies to latex.        Insurance Coverage & Financial Support  Prescription     Relevant Past Medical History and Comorbidities  Relevant medical history and concomitant health conditions were discussed with the patient. The patient's chart has been reviewed for relevant past medical history and comorbid conditions and updated as necessary.  Past Medical History:   Diagnosis Date    Arthritis     Coronary artery disease     Diabetes mellitus     Elevated cholesterol     Fracture     GERD (gastroesophageal reflux disease)     Hyperlipidemia     Hypertension     Kidney stones     Sleep apnea     no c-pap     Social History     Socioeconomic History    Marital status:    Tobacco Use    Smoking status: Former     Current packs/day: 0.00     Average packs/day: 0.5 packs/day for 10.0 years (5.0 ttl pk-yrs)     Types: Cigarettes     Start date: 2005     Quit date: 2015     Years since quitting:  10.1     Passive exposure: Past    Smokeless tobacco: Never   Vaping Use    Vaping status: Never Used   Substance and Sexual Activity    Alcohol use: Yes     Comment: social    Drug use: Never    Sexual activity: Defer       Problem list reviewed by Lillie Welch PharmD on 3/7/2025 at  8:52 AM    Allergies  Known allergies and reactions were discussed with the patient. The patient's chart has been reviewed for  allergy information and updated as necessary.   Allergies   Allergen Reactions    Penicillins Hives       Allergies reviewed by Lillie Welch PharmD on 3/7/2025 at  8:31 AM    Relevant Laboratory Values  Relevant laboratory values were discussed with the patient. The following specialty medication dose adjustment(s) are recommended: See plan, if applicable   Lab Results   Component Value Date    CHOL 97 07/20/2019    TRIG 97 07/20/2019    HDL 35 (L) 07/20/2019    LDL 43 07/20/2019 2/21/25        Current Medication List  This medication list has been reviewed with the patient and evaluated for any interactions or necessary modifications/recommendations, and updated to include all prescription medications, OTC medications, and supplements the patient is currently taking.  This list reflects what is contained in the patient's profile, which has also been marked as reviewed to communicate to other providers it is the most up to date version of the patient's current medication therapy.     Current Outpatient Medications:     Blood Glucose Monitoring Suppl (TRUE METRIX METER) w/Device kit, Use as directed to test Blood Sugar. (Patient not taking: Reported on 2/24/2025), Disp: 1 kit, Rfl: 0    clopidogrel (PLAVIX) 75 MG tablet, Take 1 tablet by mouth Daily. (Patient not taking: Reported on 3/7/2025), Disp: 90 tablet, Rfl: 2    Evolocumab (REPATHA) solution auto-injector SureClick injection, Inject 1 mL under the skin into the appropriate area as directed Every 14 (Fourteen) Days., Disp: 6 mL,  Rfl: 2    glucose blood test strip, Use as directed to test Blood Sugar 2 times a day., Disp: 50 each, Rfl: 0    HYDROcodone-acetaminophen (NORCO)  MG per tablet, Take 1 tablet by mouth Every 6 (Six) Hours As Needed for Moderate Pain. (Patient not taking: Reported on 2/26/2024), Disp: 12 tablet, Rfl: 0    HYDROcodone-acetaminophen (NORCO)  MG per tablet, Take 1 tablet by mouth Every 6 (Six) Hours As Needed for Moderate Pain. (Patient not taking: Reported on 2/26/2024), Disp: 12 tablet, Rfl: 0    ibuprofen (ADVIL,MOTRIN) 600 MG tablet, Take 1 tablet by mouth Every 6 (Six) Hours As Needed for Moderate Pain. (Patient not taking: Reported on 2/26/2024), Disp: 30 tablet, Rfl: 0    metFORMIN ER (GLUCOPHAGE-XR) 500 MG 24 hr tablet, Take 2 tablets by mouth 2 (two) times a day. (Patient not taking: Reported on 2/26/2024), Disp: , Rfl:     ondansetron ODT (ZOFRAN-ODT) 4 MG disintegrating tablet, Place 1 tablet on the tongue Every 6 (Six) Hours As Needed for Nausea or Vomiting. (Patient not taking: Reported on 2/26/2024), Disp: 20 tablet, Rfl: 0    sildenafil (VIAGRA) 100 MG tablet, TAKE ONE TABLET BY MOUTH DAILY AS NEEDED APPROXIMATELY 1 HOUR BEFORE SEXUAL ACTIVITY (Patient not taking: Reported on 2/26/2024), Disp: , Rfl:     tamsulosin (FLOMAX) 0.4 MG capsule 24 hr capsule, Take 1 capsule by mouth Daily. (Patient not taking: Reported on 2/26/2024), Disp: 30 capsule, Rfl: 0    TRUEPLUS LANCETS 28G misc, Use as Directed to test Blood Sugar 2 times a day (Patient not taking: Reported on 2/26/2024), Disp: 50 each, Rfl: 0    Medicines reviewed by Lillie Welch, PharmD on 3/7/2025 at  8:32 AM  Medicines reviewed by Lillie Welch, PharmD on 3/7/2025 at  8:32 AM    Patient reports today that he is currently not taking ANY medications.     Drug Interactions  No significant drug-drug interactions expected with Repatha according to literature     Adherence and Self-Administration  Adherence related to the  patient's specialty therapy was discussed with the patient. The Adherence segment of this outreach has been reviewed and updated.     Is there a concern with patient's ability to self administer the medication correctly and without issue?: No  Were any potential barriers to adherence identified during the initial assessment or patient education?: No  Are there any concerns regarding the patient's understanding of the importance of medication adherence?: No  Methods for Supporting Patient Adherence and/or Self-Administration: see plan, if applicable     Open Medication Therapy Problems  No medication therapy recommendations to display    Goals of Therapy  Goals related to the patient's specialty therapy were discussed with the patient. The Patient Goals segment of this outreach has been reviewed and updated.   Goals Addressed Today        Specialty Pharmacy General Goal      LDL less than 55               Medication Assessment & Plan  Medication Therapy Changes: Patient started today on Repatha 140 mg every 2 weeks.   Injection training and medication education provided.   Pt administered into the RIGHT side of the stomach   Welcome information and patient satisfaction survey to be sent by specialty pharmacy team with patient's initial fill.  Related Plans, Therapy Recommendations, or Therapy Problems to Be Addressed: Will make sure provider is aware patient is not taking any medications currently, including Plavix.   Patient will need lipid panel in 6 weeks, order placed.   Patient will continue regular follow-up with cardiology.   Patient will follow up with specialty pharmacy mail-out for next injection. Care Coordinator to set up future refill outreaches, coordinate prescription delivery, and escalate clinical questions to pharmacist.  Pharmacist to perform regular assessments no more than (6) months from the previous assessment. Will follow-up in 6 months, or sooner if needed.    Initial Education Provided for  Specialty Medication  The patient has been provided with the following education and any applicable administration techniques (i.e. self-injection) have been demonstrated for the therapies indicated. All questions and concerns have been addressed prior to the patient receiving the medication, and the patient has verbalized comprehension of the education and any materials provided. Additional patient education shall be provided and documented upon request by the patient, provider, or payer.    REPATHA® (evolocumab)  Medication Expectations   Why am I taking this medication? You are taking Repatha to lower your “bad” cholesterol (LDL-C). This medication can be used in adults with high blood cholesterol including primary hyperlipidemia and familial hypercholesterolemia.    What should I expect while on this medication? You should expect to see your cholesterol improve over time. Specifically, you should see your LDL-C decrease.    How does the medication work? Repatha works by blocking a protein called PCSK9 that contributes to high levels of bad cholesterol. It helps increase your liver's ability to remove bad cholesterol from your blood.     How long will I be on this medication for? The amount of time you will be on this medication will be determined by your doctor based on your cholesterol and/or your risk of having a cardiac event. You will most likely be on this medication or another cholesterol medication throughout your lifetime. Do not abruptly stop this medication without talking to your doctor first.    How do I take this medication? Take as directed on your prescription label. Repatha is injected under the skin (subcutaneously) of your stomach, thigh, or upper arm. This medication is usually given one or twice a month.   What are some possible side effects? The most common side effects of Repatha include redness, itching, swelling, or pain/tenderness at the injection site, symptoms of the common cold, flu  or flu-like symptoms or back pain.    What happens if I miss a dose? If you miss a dose, take it as soon as you remember if it is within 7 days from the usual day of administration then resume your original schedule. If it is beyond 7 days and you use the kareen-2-week dose, skip the missed dose and resume your normal dosing schedule.If it is beyond 7 days and you use the once-monthly dose, inject the dose and start a new schedule based on that date.      Medication Safety   What are things I should warn my doctor immediately about? Talk to your doctor if you are pregnant, planning to become pregnant, or breastfeeding. Stop the medication and tell your doctor or seek emergency medical help if you notice any signs/symptoms of an allergic reaction (severe rash, redness, hives, severe itching, trouble breathing, or swelling of the face, lips, or tongue). If you have a rubber or latex allergy, you should not use the Repatha SureClick® Autoinjector pen or the prefilled syringe, please notify your doctor or pharmacist.   What are things that I should be cautious of? Be cautious of any side effects from this medication. Talk to your doctor if any new ones develop or aren't getting better.   What are some medications that can interact with this one? There are no known significant drug interactions with Repatha. Always tell your doctor or pharmacist immediately if you start taking any new medications, including over-the-counter medications, vitamins, and herbal supplements.      Medication Storage/Handling   How should I handle this medication? Do not shake or expose the pens, cartridges, or syringes to extreme heat or direct sunlight. Keep this medication out of reach of pets/children. Allow medication to warm at room temperature prior to administration.   How does this medication need to be stored? Store unused pens, cartridges, or syringes in the refrigerator in the original cartons to protect from light. If needed, Repatha  may be kept at room temperature in the original carton for up to 30 days. Do not freeze.    How should I dispose of this medication? All the Repatha devices are single-dose and should be discarded in a sharps container after use. If your doctor decides to stop this medication, take to your local police station for proper disposal. Some pharmacies also have take-back bins for medication drop-off.      Resources/Support   How can I remind myself to take this medication? You can download reminder apps to help you manage your refills. You may also set an alarm on your phone to remind you to take your dose.    Is financial support available?  [facility] can provide co-pay cards if you have commercial insurance or patient assistance if you have Medicare or no insurance.    Which vaccines are recommended for me? Talk to your doctor about these vaccines: Flu, Coronavirus (COVID-19), Pneumococcal (pneumonia), Tdap, Hepatitis B, Zoster (shingles)         Attestation  Therapeutic appropriateness: Appropriate   I attest the patient was actively involved in and has agreed to the above plan of care. If the prescribed therapy is at any point deemed not appropriate based on the current or future assessments, a consultation will be initiated with the patient's specialty care provider to determine the best course of action. The revised plan of therapy will be documented along with any required assessments and/or additional patient education provided.     Lillie Mendoza. Rebekah, PharmD  3/7/2025  08:52 EST

## 2025-03-10 ENCOUNTER — HOSPITAL ENCOUNTER (OUTPATIENT)
Dept: CARDIOLOGY | Facility: HOSPITAL | Age: 55
Discharge: HOME | End: 2025-03-10
Admitting: PHYSICIAN ASSISTANT
Payer: COMMERCIAL

## 2025-03-10 DIAGNOSIS — I25.10 ASCVD (ARTERIOSCLEROTIC CARDIOVASCULAR DISEASE): ICD-10-CM

## 2025-03-10 LAB
BH CV STRESS BP STAGE 1: NORMAL
BH CV STRESS BP STAGE 2: NORMAL
BH CV STRESS DURATION MIN STAGE 1: 3
BH CV STRESS DURATION MIN STAGE 2: 2
BH CV STRESS DURATION SEC STAGE 1: 0
BH CV STRESS DURATION SEC STAGE 2: 0
BH CV STRESS GRADE STAGE 1: 10
BH CV STRESS GRADE STAGE 2: 12
BH CV STRESS HR STAGE 1: 141
BH CV STRESS HR STAGE 2: 164
BH CV STRESS METS STAGE 1: 5
BH CV STRESS METS STAGE 2: 7.5
BH CV STRESS PROTOCOL 1: NORMAL
BH CV STRESS RECOVERY BP: NORMAL MMHG
BH CV STRESS RECOVERY HR: 117 BPM
BH CV STRESS SPEED STAGE 1: 1.7
BH CV STRESS SPEED STAGE 2: 2.5
BH CV STRESS STAGE 1: 1
BH CV STRESS STAGE 2: 2
MAXIMAL PREDICTED HEART RATE: 164 BPM
PERCENT MAX PREDICTED HR: 100 %
STRESS BASELINE BP: NORMAL MMHG
STRESS BASELINE HR: 100 BPM
STRESS PERCENT HR: 118 %
STRESS POST ESTIMATED WORKLOAD: 7 METS
STRESS POST EXERCISE DUR MIN: 5 MIN
STRESS POST EXERCISE DUR SEC: 0 SEC
STRESS POST PEAK BP: NORMAL MMHG
STRESS POST PEAK HR: 164 BPM
STRESS TARGET HR: 139 BPM

## 2025-03-10 PROCEDURE — 93017 CV STRESS TEST TRACING ONLY: CPT

## 2025-03-10 PROCEDURE — 93018 CV STRESS TEST I&R ONLY: CPT | Performed by: INTERNAL MEDICINE

## 2025-04-11 ENCOUNTER — TELEPHONE (OUTPATIENT)
Dept: CARDIOLOGY | Facility: CLINIC | Age: 55
End: 2025-04-11
Payer: COMMERCIAL

## 2025-04-23 ENCOUNTER — TELEPHONE (OUTPATIENT)
Dept: PHARMACY | Facility: HOSPITAL | Age: 55
End: 2025-04-23
Payer: COMMERCIAL

## 2025-04-23 NOTE — TELEPHONE ENCOUNTER
Tried to contact patient to remind him to complete lipid panel for initiation of repatha therapy.    Will add in a one-time clinical outreach to double check this is complete.    Nancy Ochoa, PharmD  04/23/25  13:01 EDT

## 2025-05-15 ENCOUNTER — SPECIALTY PHARMACY (OUTPATIENT)
Dept: PHARMACY | Facility: HOSPITAL | Age: 55
End: 2025-05-15
Payer: COMMERCIAL

## 2025-05-15 NOTE — PROGRESS NOTES
Specialty Pharmacy Patient Management Program  Refill Outreach     Austin was contacted today regarding refills of their medication(s).    Refill Questions      Flowsheet Row Most Recent Value   Changes to allergies? No   Changes to medications? No   New conditions or infections since last clinic visit No   Unplanned office visit, urgent care, ED, or hospital admission in the last 4 weeks  No   How does patient/caregiver feel medication is working? Good   Financial problems or insurance changes  No   Since the previous refill, were any specialty medication doses or scheduled injections missed or delayed?  No   Does this patient require a clinical escalation to a pharmacist? No            Delivery Questions      Flowsheet Row Most Recent Value   Delivery method [employer]   Delivery address verified with patient/caregiver? Yes   Delivery address Home   Medication(s) being filled and delivered Evolocumab (REPATHA)   Copay verified? Yes   Copay amount $0   Copay form of payment No copayment ($0)   Delivery Date Selection 05/20/25   Signature Required Yes                 Follow-up: 84 day(s)     Desire Davila, Pharmacy Technician  5/15/2025  09:35 EDT

## 2025-08-06 ENCOUNTER — SPECIALTY PHARMACY (OUTPATIENT)
Dept: PHARMACY | Facility: HOSPITAL | Age: 55
End: 2025-08-06
Payer: COMMERCIAL

## (undated) DEVICE — AMD ANTIMICROBIAL GAUZE SPONGES,12 PLY USP TYPE VII, 0.2% POLYHEXAMETHYLENE BIGUANIDE HCI (PHMB): Brand: CURITY

## (undated) DEVICE — Device

## (undated) DEVICE — BIT DRL QC DIA 2.5X110MM

## (undated) DEVICE — BNDG ELAS ELITE V/CLOSE 6IN 5YD LF STRL

## (undated) DEVICE — DRP C/ARM W/BAND W/CLIPS 41X74IN

## (undated) DEVICE — DRSNG WND GZ CURAD OIL EMULSION 3X8IN STRL PK/3

## (undated) DEVICE — BNDG ELAS CO-FLEX SLF ADHR 4IN5YD LF STRL

## (undated) DEVICE — DISPOSABLE TOURNIQUET CUFF SINGLE BLADDER, SINGLE PORT AND LUER LOCK CONNECTOR: Brand: COLOR CUFF

## (undated) DEVICE — GLV SURG PREMIERPRO MIC LTX PF SZ7.5 BRN

## (undated) DEVICE — COR CYSTO: Brand: MEDLINE INDUSTRIES, INC.

## (undated) DEVICE — NITINOL STONE RETRIEVAL BASKET: Brand: ZERO TIP

## (undated) DEVICE — HOLDER: Brand: DEROYAL

## (undated) DEVICE — PK EXTREM LOWR 70

## (undated) DEVICE — UNDERCAST PADDING: Brand: DEROYAL

## (undated) DEVICE — BIT DRL QC DIA 3.5X110MM

## (undated) DEVICE — FIBR LASR FLEXIVAPULSE365 HOLMIUM FLT/TP 1P/U